# Patient Record
Sex: MALE | Race: WHITE | NOT HISPANIC OR LATINO | Employment: OTHER | URBAN - METROPOLITAN AREA
[De-identification: names, ages, dates, MRNs, and addresses within clinical notes are randomized per-mention and may not be internally consistent; named-entity substitution may affect disease eponyms.]

---

## 2018-11-19 ENCOUNTER — CONVERSION ENCOUNTER (OUTPATIENT)
Dept: SURGERY | Facility: CLINIC | Age: 63
End: 2018-11-19

## 2018-11-19 ENCOUNTER — OFFICE VISIT CONVERTED (OUTPATIENT)
Dept: SURGERY | Facility: CLINIC | Age: 63
End: 2018-11-19
Attending: SURGERY

## 2020-01-08 ENCOUNTER — HOSPITAL ENCOUNTER (OUTPATIENT)
Dept: OTHER | Facility: HOSPITAL | Age: 65
Discharge: HOME | End: 2020-01-08
Attending: INTERNAL MEDICINE

## 2020-01-08 LAB
ALBUMIN SERPL-MCNC: 4.2 G/DL (ref 3.5–5)
ALBUMIN/GLOB SERPL: 1.3 {RATIO} (ref 1.4–2.6)
ALP SERPL-CCNC: 85 U/L (ref 56–155)
ALT SERPL-CCNC: 34 U/L (ref 10–40)
ANION GAP SERPL CALC-SCNC: 15 MMOL/L (ref 8–19)
AST SERPL-CCNC: 21 U/L (ref 15–50)
BILIRUB SERPL-MCNC: 0.66 MG/DL (ref 0.2–1.3)
BUN SERPL-MCNC: 15 MG/DL (ref 5–25)
BUN/CREAT SERPL: 15 {RATIO} (ref 6–20)
CALCIUM SERPL-MCNC: 8.6 MG/DL (ref 8.7–10.4)
CHLORIDE SERPL-SCNC: 103 MMOL/L (ref 99–111)
CHOLEST SERPL-MCNC: 132 MG/DL (ref 107–200)
CHOLEST/HDLC SERPL: 3.9 {RATIO} (ref 3–6)
CONV CO2: 25 MMOL/L (ref 22–32)
CONV TOTAL PROTEIN: 7.5 G/DL (ref 6.3–8.2)
CREAT UR-MCNC: 0.98 MG/DL (ref 0.7–1.2)
GFR SERPLBLD BASED ON 1.73 SQ M-ARVRAT: >60 ML/MIN/{1.73_M2}
GLOBULIN UR ELPH-MCNC: 3.3 G/DL (ref 2–3.5)
GLUCOSE SERPL-MCNC: 120 MG/DL (ref 70–99)
HDLC SERPL-MCNC: 34 MG/DL (ref 40–60)
LDLC SERPL CALC-MCNC: 85 MG/DL (ref 70–100)
OSMOLALITY SERPL CALC.SUM OF ELEC: 290 MOSM/KG (ref 273–304)
POTASSIUM SERPL-SCNC: 4.4 MMOL/L (ref 3.5–5.3)
SODIUM SERPL-SCNC: 139 MMOL/L (ref 135–147)
TRIGL SERPL-MCNC: 67 MG/DL (ref 40–150)
VLDLC SERPL-MCNC: 13 MG/DL (ref 5–37)

## 2020-06-05 ENCOUNTER — HOSPITAL ENCOUNTER (OUTPATIENT)
Dept: OTHER | Facility: HOSPITAL | Age: 65
Discharge: HOME | End: 2020-06-05
Attending: INTERNAL MEDICINE

## 2020-06-05 LAB
ALBUMIN SERPL-MCNC: 4.6 G/DL (ref 3.5–5)
ALBUMIN/GLOB SERPL: 1.8 {RATIO} (ref 1.4–2.6)
ALP SERPL-CCNC: 65 U/L (ref 56–155)
ALT SERPL-CCNC: 24 U/L (ref 10–40)
ANION GAP SERPL CALC-SCNC: 17 MMOL/L (ref 8–19)
AST SERPL-CCNC: 17 U/L (ref 15–50)
BILIRUB SERPL-MCNC: 0.99 MG/DL (ref 0.2–1.3)
BNP SERPL-MCNC: 24 PG/ML (ref 0–900)
BUN SERPL-MCNC: 34 MG/DL (ref 5–25)
BUN/CREAT SERPL: 31 {RATIO} (ref 6–20)
CALCIUM SERPL-MCNC: 9.2 MG/DL (ref 8.7–10.4)
CHLORIDE SERPL-SCNC: 105 MMOL/L (ref 99–111)
CONV CO2: 22 MMOL/L (ref 22–32)
CONV TOTAL PROTEIN: 7.1 G/DL (ref 6.3–8.2)
CREAT UR-MCNC: 1.1 MG/DL (ref 0.7–1.2)
GFR SERPLBLD BASED ON 1.73 SQ M-ARVRAT: >60 ML/MIN/{1.73_M2}
GLOBULIN UR ELPH-MCNC: 2.5 G/DL (ref 2–3.5)
GLUCOSE SERPL-MCNC: 111 MG/DL (ref 70–99)
OSMOLALITY SERPL CALC.SUM OF ELEC: 298 MOSM/KG (ref 273–304)
POTASSIUM SERPL-SCNC: 4.3 MMOL/L (ref 3.5–5.3)
SODIUM SERPL-SCNC: 140 MMOL/L (ref 135–147)
T4 FREE SERPL-MCNC: 1.3 NG/DL (ref 0.9–1.8)
TSH SERPL-ACNC: 0.66 M[IU]/L (ref 0.27–4.2)

## 2020-06-29 ENCOUNTER — OUTSIDE FACILITY SERVICE (OUTPATIENT)
Dept: SLEEP MEDICINE | Facility: HOSPITAL | Age: 65
End: 2020-06-29

## 2020-06-29 ENCOUNTER — HOSPITAL ENCOUNTER (OUTPATIENT)
Dept: SLEEP MEDICINE | Facility: HOSPITAL | Age: 65
Discharge: HOME | End: 2020-06-29
Attending: INTERNAL MEDICINE

## 2020-06-29 PROCEDURE — 99244 OFF/OP CNSLTJ NEW/EST MOD 40: CPT | Performed by: INTERNAL MEDICINE

## 2020-07-08 ENCOUNTER — HOSPITAL ENCOUNTER (OUTPATIENT)
Dept: SLEEP MEDICINE | Facility: HOSPITAL | Age: 65
Discharge: HOME | End: 2020-07-08
Attending: INTERNAL MEDICINE

## 2020-07-13 ENCOUNTER — OUTSIDE FACILITY SERVICE (OUTPATIENT)
Dept: SLEEP MEDICINE | Facility: HOSPITAL | Age: 65
End: 2020-07-13

## 2020-07-13 PROCEDURE — 95806 SLEEP STUDY UNATT&RESP EFFT: CPT | Performed by: INTERNAL MEDICINE

## 2020-07-29 ENCOUNTER — HOSPITAL ENCOUNTER (OUTPATIENT)
Dept: SLEEP MEDICINE | Facility: HOSPITAL | Age: 65
Discharge: HOME | End: 2020-07-29
Attending: INTERNAL MEDICINE

## 2020-08-03 ENCOUNTER — OUTSIDE FACILITY SERVICE (OUTPATIENT)
Dept: SLEEP MEDICINE | Facility: HOSPITAL | Age: 65
End: 2020-08-03

## 2020-08-03 PROCEDURE — 95806 SLEEP STUDY UNATT&RESP EFFT: CPT | Performed by: INTERNAL MEDICINE

## 2020-08-13 ENCOUNTER — HOSPITAL ENCOUNTER (OUTPATIENT)
Dept: OTHER | Facility: HOSPITAL | Age: 65
Discharge: HOME | End: 2020-08-13
Attending: INTERNAL MEDICINE

## 2020-08-13 LAB — BNP SERPL-MCNC: 50 PG/ML (ref 0–900)

## 2020-09-16 ENCOUNTER — OUTSIDE FACILITY SERVICE (OUTPATIENT)
Dept: SLEEP MEDICINE | Facility: HOSPITAL | Age: 65
End: 2020-09-16

## 2020-09-16 ENCOUNTER — HOSPITAL ENCOUNTER (OUTPATIENT)
Dept: SLEEP MEDICINE | Facility: HOSPITAL | Age: 65
Discharge: HOME | End: 2020-09-16
Attending: INTERNAL MEDICINE

## 2020-09-16 PROCEDURE — 99213 OFFICE O/P EST LOW 20 MIN: CPT | Performed by: INTERNAL MEDICINE

## 2020-09-21 ENCOUNTER — OFFICE VISIT CONVERTED (OUTPATIENT)
Dept: SURGERY | Facility: CLINIC | Age: 65
End: 2020-09-21
Attending: SURGERY

## 2020-11-23 ENCOUNTER — OFFICE VISIT CONVERTED (OUTPATIENT)
Dept: PULMONOLOGY | Facility: CLINIC | Age: 65
End: 2020-11-23
Attending: INTERNAL MEDICINE

## 2020-11-23 ENCOUNTER — HOSPITAL ENCOUNTER (OUTPATIENT)
Dept: LAB | Facility: HOSPITAL | Age: 65
Discharge: HOME | End: 2020-11-23
Attending: INTERNAL MEDICINE

## 2020-11-23 ENCOUNTER — HOSPITAL ENCOUNTER (OUTPATIENT)
Dept: GENERAL RADIOLOGY | Facility: HOSPITAL | Age: 65
Discharge: HOME | End: 2020-11-23
Attending: INTERNAL MEDICINE

## 2020-11-23 LAB
ALBUMIN SERPL-MCNC: 4.4 G/DL (ref 3.5–5)
ANION GAP SERPL CALC-SCNC: 18 MMOL/L (ref 8–19)
BUN SERPL-MCNC: 24 MG/DL (ref 5–25)
BUN/CREAT SERPL: 22 {RATIO} (ref 6–20)
CALCIUM SERPL-MCNC: 9.1 MG/DL (ref 8.7–10.4)
CHLORIDE SERPL-SCNC: 104 MMOL/L (ref 99–111)
CONV CO2: 23 MMOL/L (ref 22–32)
CREAT UR-MCNC: 1.07 MG/DL (ref 0.7–1.2)
GFR SERPLBLD BASED ON 1.73 SQ M-ARVRAT: >60 ML/MIN/{1.73_M2}
GLUCOSE SERPL-MCNC: 93 MG/DL (ref 70–99)
OSMOLALITY SERPL CALC.SUM OF ELEC: 296 MOSM/KG (ref 273–304)
PHOSPHATE SERPL-MCNC: 4.4 MG/DL (ref 2.4–4.5)
POTASSIUM SERPL-SCNC: 4 MMOL/L (ref 3.5–5.3)
SODIUM SERPL-SCNC: 141 MMOL/L (ref 135–147)

## 2020-12-14 ENCOUNTER — HOSPITAL ENCOUNTER (OUTPATIENT)
Dept: OTHER | Facility: HOSPITAL | Age: 65
Discharge: HOME | End: 2020-12-14
Attending: INTERNAL MEDICINE

## 2020-12-14 LAB
ALBUMIN SERPL-MCNC: 4.3 G/DL (ref 3.5–5)
ALBUMIN/GLOB SERPL: 1.4 {RATIO} (ref 1.4–2.6)
ALP SERPL-CCNC: 69 U/L (ref 56–155)
ALT SERPL-CCNC: 30 U/L (ref 10–40)
ANION GAP SERPL CALC-SCNC: 18 MMOL/L (ref 8–19)
AST SERPL-CCNC: 21 U/L (ref 15–50)
BILIRUB SERPL-MCNC: 0.46 MG/DL (ref 0.2–1.3)
BUN SERPL-MCNC: 36 MG/DL (ref 5–25)
BUN/CREAT SERPL: 23 {RATIO} (ref 6–20)
CALCIUM SERPL-MCNC: 9.7 MG/DL (ref 8.7–10.4)
CHLORIDE SERPL-SCNC: 102 MMOL/L (ref 99–111)
CHOLEST SERPL-MCNC: 155 MG/DL (ref 107–200)
CHOLEST/HDLC SERPL: 4.7 {RATIO} (ref 3–6)
CONV CO2: 27 MMOL/L (ref 22–32)
CONV TOTAL PROTEIN: 7.3 G/DL (ref 6.3–8.2)
CREAT UR-MCNC: 1.6 MG/DL (ref 0.7–1.2)
GFR SERPLBLD BASED ON 1.73 SQ M-ARVRAT: 45 ML/MIN/{1.73_M2}
GLOBULIN UR ELPH-MCNC: 3 G/DL (ref 2–3.5)
GLUCOSE SERPL-MCNC: 116 MG/DL (ref 70–99)
HDLC SERPL-MCNC: 33 MG/DL (ref 40–60)
LDLC SERPL CALC-MCNC: 74 MG/DL (ref 70–100)
OSMOLALITY SERPL CALC.SUM OF ELEC: 305 MOSM/KG (ref 273–304)
POTASSIUM SERPL-SCNC: 3.9 MMOL/L (ref 3.5–5.3)
SODIUM SERPL-SCNC: 143 MMOL/L (ref 135–147)
TRIGL SERPL-MCNC: 240 MG/DL (ref 40–150)
VLDLC SERPL-MCNC: 48 MG/DL (ref 5–37)

## 2021-04-22 ENCOUNTER — HOSPITAL ENCOUNTER (OUTPATIENT)
Dept: CARDIOLOGY | Facility: HOSPITAL | Age: 66
Discharge: HOME | End: 2021-04-22
Attending: INTERNAL MEDICINE

## 2021-05-10 ENCOUNTER — OFFICE VISIT CONVERTED (OUTPATIENT)
Dept: SURGERY | Facility: CLINIC | Age: 66
End: 2021-05-10
Attending: SURGERY

## 2021-05-10 NOTE — H&P
History and Physical      Patient Name: Ricky Sturgeon   Patient ID: [id number]   Sex: Male   YOB: 1955    Primary Care Provider: Mehran Ortiz MD   Referring Provider: Mehran Ortiz MD    Visit Date: September 21, 2020    Provider: Jonas Barriga MD   Location: [facility] [place] [facility]   Location Address: [address] [place] [address]  [id number]   Location Phone: [phone]          Chief Complaint  · Outpatient History & Physical / Surgical Orders  · Colon Consult  · Constipation  · Reflux  · Bloating      History Of Present Illness  Ricky W. Sturgeon is a 65 year old /White male who presents to the office today as a consult from Mehran Ortiz MD.      The patient was referred to have a colonoscopy and an EGD.  Patient last had an EGD and colonoscopy on 12/19/2018.  Patient's mother and father both had colon cancer and Harvey's brother also had esophageal cancer.  The colonoscopy on 12/19/2018 showed diverticulosis but no colon polyps.  The EGD showed an irregular GE junction and biopsies confirmed the presence of Ortega's.  He takes Prilosec 20 mg daily and has taken this for many years.  Does not have much reflux at all as long as he takes Prilosec every day.  Since the patient's last colonoscopy in 2018, his brother was diagnosed with colon cancer.  The patient is very worried he may have colon cancer himself and wants to have another colonoscopy.  No change in normal bowel function.  The patient has constipation but this is not new and has been present for many years.  Patient takes Plavix.  He said it was started in September 2019 after he had an MI and a cardiac stent was placed.       Past Medical History  Disease Name Date Onset Notes   Allergic rhinitis, chronic --  --    Arthritis --  --    Bulging Disc --  --    Chest pain --  --    chronic back pain --  --    Congestive heart failure --  --    Dysphagia --  --    Gastroesophageal Reflux  "Disorder --  --    GERD --  --    Heart Attack --  --    Hemorrhoids, external --  --    High blood pressure --  --    High cholesterol --  --    Lumbago/low back pain --  --    Mood disorder --  --    Postoperative Exam Following Surgery 10/09/2014 --    Prostate Disorder --  --    Shortness Of Air --  --          Past Surgical History  Procedure Name Date Notes   Colonoscopy --  --    EGD (Endoscopy) --  --    Heel Spur --  heel spurs removed from both heels   Hernia Repair --  left inguinal   Shoulder surgery --  bilateral         Medication List  Name Date Started Instructions   aspirin 81 mg oral tablet,delayed release (DR/EC)  --    Cozaar 25 mg oral tablet  --    Lipitor 20 mg oral tablet  --    multivitamin oral capsule  --    Plavix 75 mg oral tablet  take 1 tablet (75 mg) by oral route once daily   Protonix 20 mg oral tablet,delayed release (DR/EC)  --    Prozac 10 mg oral capsule  take 1 capsule (10 mg) by oral route once daily         Allergy List  Allergen Name Date Reaction Notes   SULFA (SULFONAMIDES) --  --  --        Allergies Reconciled  Family Medical History  Disease Name Relative/Age Notes   Colon Neoplasm, Malignant Father/  Mother/   --    Stroke  --    Cancer, Unspecified  --    Diabetes, unspecified type Mother/   Mother   Family history of colon cancer Father/70s  Mother/70s   Mother/70s; Father/70s         Social History  Finding Status Start/Stop Quantity Notes   Second hand smoke exposure Current some day --/-- --  yes   Tobacco Never --/-- --  never a smoker         Review of Systems  · Constitutional  o Denies  o : chills, fever  · Gastrointestinal  o Denies  o : vomiting      Vitals  Date Time BP Position Site L\R Cuff Size HR RR TEMP (F) WT  HT  BMI kg/m2 BSA m2 O2 Sat        09/21/2020 11:39 AM       14  254lbs 4oz 5'  8\" 38.66 2.35           Physical Examination  · Constitutional  o Appearance  o : healthy appearing, alert and in no acute distress, reliable historian, here " alone  · Head and Face  o Head  o :   § Inspection  § : no visable deformities or lesions  · Eyes  o Conjunctivae  o : clear  o Sclerae  o : clear  · Neck  o Inspection/Palpation  o : normal appearance, no masses, trachea midline  · Respiratory  o Respiratory Effort  o : breathing unlabored, respiratory effort appears normal  o Inspection of Chest  o : normal appearance, no retractions  · Cardiovascular  o Heart  o : regular rate and rhythm  · Gastrointestinal  o Abdominal Examination  o :   § Abdomen  § : soft, nondistended  · Skin and Subcutaneous Tissue  o General Inspection  o : no visible concerning rashes or lesions present  · Neurologic  o Cranial Nerves  o : no obvious motor deficits  o Sensation  o : no obvious sensory deficits  o Gait and Station  o :   § Gait Screening  § : normal gait, able to stand without diffculty  o Cerebellar Function  o : no obvious abnormalities  · Psychiatric  o Judgement and Insight  o : judgment and insight intact  o Mood and Affect  o : mood normal, affect appropriate          Assessment  · Pre-Surgical Orders     V72.84  · Family history of malignant neoplasm of bowel     V16.0/Z80.0  · GERD (gastroesophageal reflux disease)     530.81/K21.9  · Ortega esophagus     530.85/K22.70      Plan  · Orders  o Colonoscopy (06953) - V72.84 - 10/14/2020  o Endoscopy (52065) - V72.84 - 10/14/2020  o [facility] Covid-19 Screening (65756) - V72.84, V16.0/Z80.0, 530.85/K22.70, 530.81/K21.9 - 10/09/2020   Covid test at 3:00pm  · Medications  o Medications have been Reconciled  o Transition of Care or Provider Policy  · Instructions  o PLAN: Colonoscopy and Esophagogastroduodenoscopy  o Outpatient  o The indications, options, risks, benefits, and expected outcomes of the planned procedure were discussed with the patient and the patient agrees to proceed.   o IV: LR@ 30 ml/hr  o Anesthesia: MAC  o PLEASE SIGN PERMIT FOR: Colonscopy with possible biopsy and possible polypectomy  o PLEASE SIGN  PERMIT FOR: Esophagogastroduodenoscopy with possible biopsy  o Electronically Identified Patient Education Materials Provided Electronically     Patient was instructed to stop taking Plavix 5 days before the day the colonoscopy and EGD will be performed.    Addendum, 9/22/20:  The patient called the office after he left and informed me that his cardiac stent was placed just 6 months ago (note one year as he told me during the office visit).  That said, there is no urgency for the patient to have the endoscopy procedures so the patient will schedule appointment to see me again in 6 months.  That will be one year after the cardiac stent was placed and Plavix can then be more safely held for 5 days for the endoscopy procedures.             Electronically Signed by: Jonas Barriga MD -Author on September 22, 2020 08:35:46 AM

## 2021-05-14 VITALS — HEIGHT: 68 IN | WEIGHT: 254.25 LBS | RESPIRATION RATE: 14 BRPM | BODY MASS INDEX: 38.53 KG/M2

## 2021-05-16 VITALS — WEIGHT: 236.25 LBS | RESPIRATION RATE: 16 BRPM | HEIGHT: 68 IN | BODY MASS INDEX: 35.8 KG/M2

## 2021-05-28 VITALS
OXYGEN SATURATION: 96 % | DIASTOLIC BLOOD PRESSURE: 66 MMHG | HEART RATE: 68 BPM | HEIGHT: 68 IN | BODY MASS INDEX: 37.89 KG/M2 | RESPIRATION RATE: 18 BRPM | WEIGHT: 250 LBS | TEMPERATURE: 98.4 F | SYSTOLIC BLOOD PRESSURE: 124 MMHG

## 2021-05-28 NOTE — PROGRESS NOTES
Patient: STURGEON,RICKY W     Acct: [id number]     Report: #[id number]  UNIT #: [id number]     : 1955    Encounter Date:2020  PRIMARY CARE: PK MARIE  ***Signed***  --------------------------------------------------------------------------------------------------------------------  Chief Complaint      Encounter Date      2020            Referring Provider      SELF,REFERRED            Patient Complaint      Patient is complaining of      New Patient- SOA            VITALS      Height 5 ft 8 in / 172.72 cm      Weight 250 lbs 0 oz / 113.3981 kg      BSA 2.25 m2      BMI 38.0 kg/m2      Temperature 98.4 F / 36.89 C - Temporal      Pulse 68      Respirations 18      Blood Pressure 124/66 Sitting, Right Arm      Pulse Oximetry 96%, room air      Initial Exhaled Nitrous Oxide      Date:  2020      Exhaled Nitrous Oxide Results:  10            HPI      The patient is a 65 year old male with a history of obese, obstructive sleep     apnea, congestive heart failure and diastolic congestive heart failure with     history of stent placement in 2018. He has had a 40 pound weight gain and has    been having worsening shortness of breath.  The patient is referred to me for     exertional dyspnea. He has no cough, fever, chills, nausea, vomiting or     wheezing. He has leg swelling and shortness of breath with activity. He use to     be very active, but now is not able to walk, even on level ground he cannot walk    long distance. He never smoked, never had pulmonary function test.  He had an     echocardiogram in the past with Dr. Monge that showed diastolic heart     failure. He has significant leg swelling, does not have any leg pains.  No chest    pain or chest tightness.  He had a pacemaker in the past. He is currently on     Lasix.            ROS      Constitutional:  Complains of: Fatigue; Denies: Fever, Weight gain, Weight loss,    Chills, Insomnia, Other       Respiratory/Breathing:  Complains of: Shortness of air, Wheezing; Denies: Cough,    Hemoptysis, Pleuritic pain, Other      Endocrine:  Denies: Polydipsia, Polyuria, Heat/cold intolerance, Diabetes, Other      Eyes:  Denies: Blurred vision, Vision Changes, Other      Ears, nose, mouth, throat:  Denies: Mouth lesions, Thrush, Throat pain,     Hoarseness, Allergies/Hay Fever, Post Nasal Drip, Headaches, Recent Head Injury,    Nose Bleeding, Neck Stiffness, Thyroid Mass, Hearing Loss, Ear Fullness, Dry     Mouth, Nasal or Sinus Pain, Dry Lips, Nasal discharge, Nasal congestion, Other      Cardiovascular:  Denies: Palpitations, Syncope, Claudication, Chest Pain, Wake     up Gasping for air, Leg Swelling, Irregular Heart Rate, Cyanosis, Dyspnea on     Exertion, Other      Gastrointestinal:  Denies: Nausea, Constipation, Diarrhea, Abdominal pain,     Vomiting, Difficulty Swallowing, Reflux/Heartburn, Dysphagia, Jaundice,     Bloating, Melena, Bloody stools, Other      Genitourinary:  Denies: Urinary frequency, Incontinence, Hematuria, Urgency,     Nocturia, Dysuria, Testicular problems, Other      Musculoskeletal:  Denies: Joint Pain, Joint Stiffness, Joint Swelling, Myalgias,    Other      Hematologic/lymphatic:  DENIES: Lymphadenopathy, Bruising, Bleeding tendencies,     Other      Neurological:  Denies: Headache, Numbness, Weakness, Seizures, Other      Psychiatric:  Denies: Anxiety, Appropriate Effect, Depression, Other      Sleep:  No: Excessive daytime sleep, Morning Headache?, Snoring, Insomnia?, Stop    breathing at sleep?, Other      Integumentary:  Denies: Rash, Dry skin, Skin Warm to Touch, Other      Immunologic/Allergic:  Denies: Latex allergy, Seasonal allergies, Asthma,     Urticaria, Eczema, Other      Immunization status:  No: Up to date            FAMILY/SOCIAL/MEDICAL HX      Surgical History:  Yes: Orthopedic Surgery (LEFT KNEE), Other Surgeries; No:     Abdominal Surgery, Appendectomy, Bladder  Surgery, Bowel Surgery, CABG,     Cholecystectomy, Head Surgery, Oral Surgery, Vascular Surgery      Other Family Medical History:  Father      Is Father Still Living?:  No      Is Mother Still Living?:  No       Family History:  Yes      Social History:  No Tobacco Use, No Alcohol Use, No Recreational Drug use      Smoking status:  Never smoker      Anticoagulation Therapy:  No      Antibiotic Prophylaxis:  No      Medical History:  Yes: Arthritis (BACK), Seizures, Heart Attack, High Blood     Pressure; No: Alcoholism, Allergies, Anemia, Asthma, Atrial Fibrillation, Blood     Disease, Broken Bones, Cataracts, Chemical Dependency, Chemotherapy/Cancer,     Chronic Bronchitis/COPD, Emphysema, Chronic Liver Disease, Colon Trouble,     Colitis, Diverticulitis, Congestive Heart Failu, Deafness or Ringing Ears,     Convulsions, Depression, Anxiety, Bipolar Disorder, PTSD, Diabetes, Epilepsy,     Forgetfullness, Glaucoma, Gall Stones, Gout, Head Injury, Heart Murmur, GERD,     Hemorrhoids/Rectal Prob, Hepatitis, Hiatal Hernia, High Cholesterol, HIV (Do not    ask - volu, Jaundice, Kidney or Bladder Disease, Kidney Stones, Migrane     Headaches, Mitral Valve Prolapse, Night sweats, Phlebitis, Psychiatric Care,     Reflux Disease, Rheumatic Fever, Sexually Transmitted Dis, Shortness Of Breath,     Sinus Trouble, Skin Disease/Psoriais/Ecz, Stroke, Thyroid Problem, Tuberculosis     or Pos TB Te, Miscellaneous Medical/oth      Psychiatric History      none            PREVENTION      Hx Influenza Vaccination:  No      Influenza Vaccine Declined:  Yes      2 or More Falls in Past Year?:  No      Fall Past Year with Injury?:  No      Hx Pneumococcal Vaccination:  No      Encouraged to follow-up with:  PCP regarding preventative exams.      Chart initiated by      Blaire Stanford MA            ALLERGIES/MEDICATIONS      Allergies:        Coded Allergies:             SULFA (SULFONAMIDE ANTIBIOTICS) (Verified  Allergy, Unknown, RASH,      SWELLING, 11/23/20)           SULFAMETHOXAZOLE (Verified  Allergy, Unknown, RASH, 11/23/20)           TRIMETHOPRIM (Verified  Allergy, Unknown, RASH, 11/23/20)      Medications    Last Reconciled on 11/23/20 15:33 by ALON MOURA MD      metOLazone (metOLazone) 5 Mg Tablet      10 MG PO QDAY, #60 TAB 1 Refill         Prov: Alon Moura         11/23/20       Atorvastatin Calcium (Lipitor*) 20 Mg Tablet      40 MG PO QDAY, #60 TAB 0 Refills         Reported         11/23/20       Furosemide* (Lasix*) 40 Mg Tablet      40 MG PO TID, #90 TAB 0 Refills         Reported         11/23/20       Pantoprazole (Protonix) 20 Mg Tablet.dr      40 MG PO QDAY, TAB         Reported         11/23/20       Clopidogrel Bisulfate (Plavix) 75 Mg Tablet      75 MG PO QDAY, #30 TAB 0 Refills         Reported         11/23/20       Losartan Potassium (Cozaar) 100 Mg Tablet      100 MG PO QDAY, #30 TAB 0 Refills         Reported         11/23/20       Metoprolol Succinate (Metoprolol Succinate) 25 Mg Tab.er.24h      25 MG PO QDAY, #30 TAB 0 Refills         Reported         11/23/20       FLUoxetine HCl (FLUoxetine HCl) 20 Mg Capsule      20 MG PO HS, CAP         Reported         11/23/20       Aspirin EC (Aspirin EC) 81 Mg Tablet.dr      81 MG PO QDAY, #30 TAB.SR 0 Refills         Reported         9/7/19       Meloxicam (Mobic*) 15 Mg Tablet      15 MG PO QDAY, TAB         Reported         7/12/19      Current Medications      Current Medications Reviewed 11/23/20            EXAM      CONSTITUTIONAL: Pleasant  obese male in no acute distress, normal conversant.       EYES : Pink conjunctive, no ptosis, PERRL.       ENMT : Nose and ears appear normal, normal dentition, mild posterior pharyngeal     wall erythema, no sinus tenderness. Mallampati classification 2.        Neck: Nontender, no masses, no thyromegaly, no nodules.      Resp : Bilateral diminished breath sounds, right basilar crackles.  No wheezing,    crackles or rhonchi.   Resonant to percussion bilaterally.      CVS  : No carotid bruits, s1s2 nl, RRR, no murmur, rubs or gallop.        Extremities:  1+ pedal edema bilateral lower extremities, nontender to     palpation.        Chest wall: Normal rise with inspiration, nontender on palpation.      GI   : Abdomen soft, with no masses, no hepatosplenomegaly, no hernias, BS+      MSK  : Normal gait and station, no digital cyanosis or clubbing       Skin : No rashes, ulcerations or lesions, normal turgor and temperature      Neuro: CN II - XII intact, no sensory deficits, DTRs intact and symmetrical, no     motor weakness      Psych: Appropriate affect, A   Vtials      Vitals:             Height 5 ft 8 in / 172.72 cm           Weight 250 lbs 0 oz / 113.3981 kg           BSA 2.25 m2           BMI 38.0 kg/m2           Temperature 98.4 F / 36.89 C - Temporal           Pulse 68           Respirations 18           Blood Pressure 124/66 Sitting, Right Arm           Pulse Oximetry 96%, room air            REVIEW      Results Reviewed      PCCS Results Reviewed?:  Yes Prev Lab Results, Yes Prev Radiology Results, Yes     [facility]      Lab Results      The patient's NT-proBNP in the past was normal. The patient's serum creatinine     from 2020 was normal.  The patient's echocardiogram from Dr. Monge's     office from 06/10/2020 showed normal sized left ventricle with good     contractility, mild tricuspid and aortic regurgitation and diastolic dysfunction    of left ventricle.      Radiographic Results               [facility] Diagnostic Img                PACS RADIOLOGY REPORT            Patient: STURGEON,RICKY W   Acct: #[id number]   Report: #[id number]            UNIT #: [id number]    DOS: 20 1602      INSURANCE:[employer] - PPO   ORDER #:[id number]      LOCATION:IVORY     : 1955            PROVIDERS      ADMITTING:     ATTENDING: Alon Moura       FAMILY:  NONE,MD   ORDERING:  Alon Moura         OTHER:    DICTATING:  MIKE HARDY MD            REQ #:[id number]   EXAM:CXR2 - CHEST 2V AP PA LAT      REASON FOR EXAM:        REASON FOR VISIT:  DYSPNEA            *******Signed******         PROCEDURE:   CHEST AP/PA AND LATERAL             COMPARISON:   [facility], , CHEST AP/PA 1 VIEW, 8/14/2020,     11:30.             INDICATIONS:   SHORTNESS OF BREATH WITH MILD EXERTION FOR 18 MONTHS.             FINDINGS:         Heart size unchanged.  Pacer leads are stable.  No dense consolidation, pleural     fluid or       pneumothorax.             CONCLUSION:   No acute change from 8/14/2020              MIKE HARDY MD             Electronically Signed and Approved By: MIKE HARDY MD on 11/23/2020 at 16:50                               Until signed, this is an unconfirmed preliminary report that may contain      errors and is subject to change.                                              DOWER:      D:11/23/20 1650            Assessment      YADAV (dyspnea on exertion) - R06.00            Notes      New Medications      * FLUoxetine HCl 20 MG CAPSULE: 20 MG PO HS      * Metoprolol Succinate 25 MG TAB.ER.24H: 25 MG PO QDAY #30      * Losartan Potassium (Cozaar) 100 MG TABLET: 100 MG PO QDAY #30      * Clopidogrel Bisulfate (Plavix) 75 MG TABLET: 75 MG PO QDAY #30      * PANTOPRAZOLE (Protonix) 20 MG TABLET.DR: 40 MG PO QDAY      * Furosemide* (Lasix*) 40 MG TABLET: 40 MG PO TID #90      * Atorvastatin Calcium (Lipitor*) 20 MG TABLET: 40 MG PO QDAY #60      * metOLazone 5 MG TABLET: 10 MG PO QDAY #60      Discontinued Medications      * TICAGRELOR (Brilinta) 90 MG TABLET: 90 MG PO BID 30 Days #60      * Atorvastatin 40 MG TABLET: 40 MG PO HS 30 Days #30      New Diagnostics      * PFT-Comp, PrePost,DLCO,BodyBox, Week         Dx: YADAV (dyspnea on exertion) - R06.00      * 6 Min Walk w O2 Titration Test, Routine         Dx: YADAV (dyspnea on exertion) - R06.00       * Renal Function Panel, Month         Dx: YADAV (dyspnea on exertion) - R06.00      * TESTING ORDERS PROCESS, Routine      * Chest 2 View, Week         Dx: YADAV (dyspnea on exertion) - R06.00      * BMP, Routine         Dx: YADAV (dyspnea on exertion) - R06.00      PLAN:  The patient is a 65 year old male with exertional dyspnea, likely related    to diastolic heart failure, obesity and obstructive sleep apnea.      1.  Diastolic heart failure with obesity and obstructive sleep apnea.  I will     check NT-proBNP, renal panel and chest x-ray today. I will add metolazone 10 mg     once daily on top of Lasix if his kidney function is stable. I advised him to     weigh himself everyday and try to maintain weight or decrease weight if     possible. Dietary modification was discussed in detail as well.        2. I will check pulmonary function test and six minute walk test.        3. Continue with Lasix at 40 mg once daily.      4. Obstructive sleep apnea.  Sleep hygiene was discussed in detail.  Weight loss    counseling was done.      5. Continue CPAP at current settings.  I will check pulmonary function test, six    minute walk test and have him follow up in two months.       6.  His FENO today was 10 which shows he does not have significant chronic     eosinophilic inflammation.        7. Follow up in 2-3 months, earlier if needed.            Patient Education      Education resources provided:  Yes      Patient Education Provided:  Acute Bronchitis            Electronically signed by Alon Moura  11/25/2020 21:06       Disclaimer: Converted document may not contain table formatting or lab diagrams. Please see [employer] for the authenticated document.

## 2021-06-05 NOTE — H&P
History and Physical      Patient Name: Ricky Sturgeon   Patient ID: [id number]   Sex: Male   YOB: 1955    Primary Care Provider: Mehran Ortiz MD   Referring Provider: Mehran Ortiz MD    Visit Date: May 10, 2021    Provider: Jonas Barriga MD   [place] [facility]   Location Address: [address] [place] [address]  [id number]   Location Phone: [phone]          Chief Complaint  · Outpatient History & Physical / Surgical Orders  · Colon Consult  · Constipation  · Reflux  · Bloating      History Of Present Illness  Ricky W. Sturgeon is a 66 year old /White male who presents to the office today as a consult from Mehran Ortiz MD.      Patient saw me on 9/21/2020 to schedule an EGD and a colonoscopy but at the time he had had a heart stent placed just 6 months before that office visit and was taking Plavix so I told him we needed to defer doing the colonoscopy and EGD until at least 1 year after the stent was placed so that Plavix could more safely be held for 5 days for the endoscopic procedures.  Patient is here to schedule the EGD and colonoscopy.  A copy and paste of the HPI section and plan section from my office visit on 9/21/2020 are available below.  There have been no changes regarding the indications for the colonoscopy and EGD.    Copy and paste of HPI section from office visit on 9/21/2020  The patient was referred to have a colonoscopy and an EGD.  Patient last had an EGD and colonoscopy on 12/19/2018.  Patient's mother and father both had colon cancer and Harvey's brother also had esophageal cancer.  The colonoscopy on 12/19/2018 showed diverticulosis but no colon polyps.  The EGD showed an irregular GE junction and biopsies confirmed the presence of Ortega's.  He takes Prilosec 20 mg daily and has taken this for many years.  Does not have much reflux at all as long as he takes Prilosec every day.  Since the patient's last  colonoscopy in 2018, his brother was diagnosed with colon cancer.  The patient is very worried he may have colon cancer himself and wants to have another colonoscopy.  No change in normal bowel function.  The patient has constipation but this is not new and has been present for many years.  Patient takes Plavix.  He said it was started in September 2019 after he had an MI and a cardiac stent was placed.    Copy and paste of Plan section from office visit on 9/21/2020  Patient was instructed to stop taking Plavix 5 days before the day the colonoscopy and EGD will be performed.    Addendum, 9/22/20:  The patient called the office after he left and informed me that his cardiac stent was placed just 6 months ago (note one year as he told me during the office visit).  That said, there is no urgency for the patient to have the endoscopy procedures so the patient will schedule appointment to see me again in 6 months.  That will be one year after the cardiac stent was placed and Plavix can then be more safely held for 5 days for the endoscopy procedures.       Past Medical History  Disease Name Date Onset Notes   Allergic rhinitis, chronic --  --    Arthritis --  --    Bulging Disc --  --    Chest pain --  --    chronic back pain --  --    Congestive heart failure --  --    Dysphagia --  --    Gastroesophageal Reflux Disorder --  --    GERD --  --    Heart Attack --  --    Hemorrhoids, external --  --    High blood pressure --  --    High cholesterol --  --    Lumbago/low back pain --  --    Mood disorder --  --    Postoperative Exam Following Surgery 10/09/2014 --    Prostate Disorder --  --    Shortness Of Air --  --          Past Surgical History  Procedure Name Date Notes   Colonoscopy --  --    EGD (Endoscopy) --  --    Heel Spur --  heel spurs removed from both heels   Hernia Repair --  left inguinal   Shoulder surgery --  bilateral         Medication List  Name Date Started Instructions   aspirin 81 mg oral  "tablet,delayed release (DR/EC)  --    atorvastatin 40 mg oral tablet  take 1 tablet (40 mg) by oral route once daily   Cozaar 25 mg oral tablet  --    furosemide 40 mg oral tablet  take 1 tablet (40 mg) by oral route once daily   Lipitor 20 mg oral tablet  --    losartan 100 mg oral tablet  take 1 tablet (100 mg) by oral route once daily   meloxicam 15 mg oral tablet  take 1 tablet (15 mg) by oral route once daily   metolazone 5 mg oral tablet  take 2 tablets (10 mg) by oral route once daily   metoprolol succinate 25 mg oral tablet extended release 24 hr  take 1 tablet (25 mg) by oral route once daily   multivitamin oral capsule  --    Plavix 75 mg oral tablet  take 1 tablet (75 mg) by oral route once daily   Probiotic 15 billion cell oral capsule  take 1 capsule by oral route daily   Protonix 20 mg oral tablet,delayed release (DR/EC)  --    Prozac 10 mg oral capsule  take 1 capsule (10 mg) by oral route once daily         Allergy List  Allergen Name Date Reaction Notes   SULFA (SULFONAMIDES) --  --  --        Allergies Reconciled  Family Medical History  Disease Name Relative/Age Notes   Colon Neoplasm, Malignant Father/  Mother/   --    Stroke  --    Cancer, Unspecified  --    Diabetes, unspecified type Mother/   Mother   Family history of colon cancer Father/70s  Mother/70s   Mother/70s; Father/70s         Social History  Finding Status Start/Stop Quantity Notes   Second hand smoke exposure Current some day --/-- --  yes   Tobacco Never --/-- --  never a smoker         Review of Systems  · Constitutional  o Denies  o : chills, fever  · Gastrointestinal  o Denies  o : vomiting      Vitals  Date Time BP Position Site L\R Cuff Size HR RR TEMP (F) WT  HT  BMI kg/m2 BSA m2 O2 Sat FR L/min FiO2        05/10/2021 09:58 AM       17  258lbs 0oz 5'  8\" 39.23 2.37             Physical Examination  · Constitutional  o Appearance  o : alert and in no acute distress, reliable historian, wife present in room  · Head and " Face  o Head  o :   § Inspection  § : no visable deformities or lesions  · Eyes  o Conjunctivae  o : clear  o Sclerae  o : clear  · Neck  o Inspection/Palpation  o : normal appearance, no masses, trachea midline  · Respiratory  o Respiratory Effort  o : breathing unlabored, respiratory effort appears normal  o Inspection of Chest  o : normal appearance, no retractions  · Cardiovascular  o Heart  o : regular rate and rhythm  · Gastrointestinal  o Abdominal Examination  o :   § Abdomen  § : soft, nondistended  · Skin and Subcutaneous Tissue  o General Inspection  o : no visible concerning rashes or lesions present  · Neurologic  o Cranial Nerves  o : no obvious motor deficits  o Sensation  o : no obvious sensory deficits  o Gait and Station  o :   § Gait Screening  § : normal gait, able to stand without diffculty  o Cerebellar Function  o : no obvious abnormalities  · Psychiatric  o Judgement and Insight  o : judgment and insight intact  o Mood and Affect  o : mood normal, affect appropriate          Assessment  · Pre-Surgical Orders     V72.84  · Family history of malignant neoplasm of bowel     V16.0/Z80.0  · GERD (gastroesophageal reflux disease)     530.81/K21.9  · 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   o IV: LR@ 30 ml/hr  o Anesthesia: MAC  o PLEASE SIGN PERMIT FOR: Colonscopy with possible biopsy and possible polypectomy  o PLEASE SIGN PERMIT FOR: Esophagogastroduodenoscopy with possible biopsy  o Follow Up: Will decide if/when follow up needed after procedure  performed.  o Electronically Identified Patient Education Materials Provided Electronically     Patient was instructed to stop taking Plavix 5 days before the day the colonoscopy and EGD will be performed.  Patient says he has already received the Covid vaccination.                 Electronically Signed by: Jonas Barriga MD -Author on May 10, 2021 10:55:13 AM

## 2021-06-09 ENCOUNTER — TELEPHONE (OUTPATIENT)
Dept: SURGERY | Facility: CLINIC | Age: 66
End: 2021-06-09

## 2021-06-09 NOTE — TELEPHONE ENCOUNTER
Called and spoke with pt. He r/s colonoscopy and EGD to 7/21/21. Called and spoke with Monique in surgery scheduling.

## 2021-06-09 NOTE — TELEPHONE ENCOUNTER
Pts wife called and stated he needs to r/s his colon and EGD because he is having some issues with his spine and maybe some neurologic issues. Pt said Dr. Barriga is aware of other issues and told the patient and his wife to keep him in the loop. The other physicians the patient sees suggested they hold off on the colonoscopy/EGD for now. The patient's wife said they will call back to r/s the procedures once his other problems have been worked up

## 2021-06-17 ENCOUNTER — TRANSCRIBE ORDERS (OUTPATIENT)
Dept: ADMINISTRATIVE | Facility: HOSPITAL | Age: 66
End: 2021-06-17

## 2021-06-17 DIAGNOSIS — M50.30 DEGENERATION OF CERVICAL INTERVERTEBRAL DISC: Primary | ICD-10-CM

## 2021-06-17 DIAGNOSIS — M54.10 RADICULOPATHY, UNSPECIFIED SPINAL REGION: ICD-10-CM

## 2021-06-23 ENCOUNTER — APPOINTMENT (OUTPATIENT)
Dept: CT IMAGING | Facility: HOSPITAL | Age: 66
End: 2021-06-23

## 2021-06-23 ENCOUNTER — HOSPITAL ENCOUNTER (OUTPATIENT)
Dept: CT IMAGING | Facility: HOSPITAL | Age: 66
Discharge: HOME | End: 2021-06-23

## 2021-06-23 DIAGNOSIS — M54.10 RADICULOPATHY, UNSPECIFIED SPINAL REGION: ICD-10-CM

## 2021-06-23 DIAGNOSIS — M50.30 DEGENERATION OF CERVICAL INTERVERTEBRAL DISC: ICD-10-CM

## 2021-06-23 PROCEDURE — 72131 CT LUMBAR SPINE W/O DYE: CPT

## 2021-06-23 PROCEDURE — 72128 CT CHEST SPINE W/O DYE: CPT

## 2021-06-28 ENCOUNTER — OFFICE VISIT (OUTPATIENT)
Dept: SURGERY | Facility: CLINIC | Age: 66
End: 2021-06-28

## 2021-06-28 VITALS — HEIGHT: 68 IN | BODY MASS INDEX: 39.47 KG/M2 | WEIGHT: 260.4 LBS

## 2021-06-28 DIAGNOSIS — M62.08 DIASTASIS RECTI: Primary | ICD-10-CM

## 2021-06-28 PROBLEM — M54.50 LOW BACK PAIN: Status: ACTIVE | Noted: 2021-06-28

## 2021-06-28 PROBLEM — N42.9 PROSTATE DISORDER: Status: ACTIVE | Noted: 2021-06-28

## 2021-06-28 PROBLEM — I21.9 HEART ATTACK (HCC): Status: ACTIVE | Noted: 2021-06-28

## 2021-06-28 PROBLEM — M51.9 OTHER AND UNSPECIFIED DISC DISORDER: Status: ACTIVE | Noted: 2021-06-28

## 2021-06-28 PROBLEM — F39 MOOD DISORDER (HCC): Status: ACTIVE | Noted: 2021-06-28

## 2021-06-28 PROBLEM — M54.16 LUMBAR RADICULOPATHY: Status: ACTIVE | Noted: 2021-06-24

## 2021-06-28 PROBLEM — R13.10 DYSPHAGIA: Status: ACTIVE | Noted: 2021-06-28

## 2021-06-28 PROBLEM — R06.02 SHORTNESS OF BREATH: Status: ACTIVE | Noted: 2020-02-02

## 2021-06-28 PROBLEM — M50.30 DDD (DEGENERATIVE DISC DISEASE), CERVICAL: Status: ACTIVE | Noted: 2021-06-24

## 2021-06-28 PROBLEM — I63.9 STROKE (CEREBRUM) (HCC): Status: ACTIVE | Noted: 2021-06-28

## 2021-06-28 PROBLEM — K64.4 HEMORRHOIDS, EXTERNAL: Status: ACTIVE | Noted: 2021-06-28

## 2021-06-28 PROBLEM — I50.9 CONGESTIVE HEART FAILURE: Status: ACTIVE | Noted: 2021-06-28

## 2021-06-28 PROBLEM — I25.10 CORONARY ARTERY DISEASE: Status: ACTIVE | Noted: 2021-06-28

## 2021-06-28 PROBLEM — M54.12 CERVICAL RADICULOPATHY: Status: ACTIVE | Noted: 2021-06-24

## 2021-06-28 PROBLEM — R29.90 STROKE-LIKE SYMPTOMS: Status: ACTIVE | Noted: 2021-05-19

## 2021-06-28 PROBLEM — I10 HIGH BLOOD PRESSURE: Status: ACTIVE | Noted: 2021-06-28

## 2021-06-28 PROBLEM — M54.9 CHRONIC BACK PAIN: Status: ACTIVE | Noted: 2021-06-28

## 2021-06-28 PROBLEM — M19.90 ARTHRITIS: Status: ACTIVE | Noted: 2021-06-28

## 2021-06-28 PROBLEM — J30.9 ALLERGIC RHINITIS: Status: ACTIVE | Noted: 2021-06-28

## 2021-06-28 PROBLEM — I65.23 CAROTID ATHEROSCLEROSIS, BILATERAL: Status: ACTIVE | Noted: 2020-09-04

## 2021-06-28 PROBLEM — K21.9 ESOPHAGEAL REFLUX: Status: ACTIVE | Noted: 2021-06-28

## 2021-06-28 PROBLEM — E78.00 HIGH CHOLESTEROL: Status: ACTIVE | Noted: 2021-06-28

## 2021-06-28 PROBLEM — R07.9 CHEST PAIN: Status: ACTIVE | Noted: 2021-06-28

## 2021-06-28 PROBLEM — G89.29 CHRONIC BACK PAIN: Status: ACTIVE | Noted: 2021-06-28

## 2021-06-28 PROCEDURE — 99212 OFFICE O/P EST SF 10 MIN: CPT | Performed by: SURGERY

## 2021-06-28 RX ORDER — ESCITALOPRAM OXALATE 20 MG/1
20 TABLET ORAL DAILY
COMMUNITY
End: 2021-12-07

## 2021-06-28 RX ORDER — METOPROLOL SUCCINATE 25 MG/1
TABLET, EXTENDED RELEASE ORAL
COMMUNITY
End: 2021-12-07

## 2021-06-28 RX ORDER — LANCETS 33 GAUGE
EACH MISCELLANEOUS
COMMUNITY
Start: 2021-06-21 | End: 2021-12-07

## 2021-06-28 RX ORDER — BLOOD-GLUCOSE METER
EACH MISCELLANEOUS
Status: ON HOLD | COMMUNITY
Start: 2021-06-21 | End: 2021-07-28

## 2021-06-28 RX ORDER — BLOOD-GLUCOSE METER
EACH MISCELLANEOUS SEE ADMIN INSTRUCTIONS
COMMUNITY
Start: 2021-06-21 | End: 2021-12-07

## 2021-06-28 RX ORDER — BLOOD SUGAR DIAGNOSTIC
STRIP MISCELLANEOUS
COMMUNITY
Start: 2021-06-21 | End: 2021-12-07

## 2021-06-28 RX ORDER — LANCETS 33 GAUGE
EACH MISCELLANEOUS
Status: ON HOLD | COMMUNITY
Start: 2021-06-21 | End: 2021-07-28

## 2021-06-28 RX ORDER — FUROSEMIDE 40 MG/1
40 TABLET ORAL DAILY
COMMUNITY
Start: 2021-05-05 | End: 2021-12-07

## 2021-06-28 RX ORDER — LOSARTAN POTASSIUM 25 MG/1
100 TABLET ORAL NIGHTLY
COMMUNITY
End: 2021-12-07

## 2021-06-28 RX ORDER — CLOPIDOGREL BISULFATE 75 MG/1
75 TABLET ORAL
COMMUNITY

## 2021-06-28 RX ORDER — ATORVASTATIN CALCIUM 20 MG
TABLET ORAL
COMMUNITY
End: 2021-07-27

## 2021-06-28 RX ORDER — METFORMIN HYDROCHLORIDE 500 MG/1
1000 TABLET, EXTENDED RELEASE ORAL DAILY
COMMUNITY
Start: 2021-06-19

## 2021-06-28 RX ORDER — BLOOD SUGAR DIAGNOSTIC
1 STRIP MISCELLANEOUS DAILY
COMMUNITY
Start: 2021-06-21 | End: 2021-12-07

## 2021-06-28 RX ORDER — PANTOPRAZOLE SODIUM 40 MG/1
40 TABLET, DELAYED RELEASE ORAL DAILY
COMMUNITY
Start: 2021-05-05

## 2021-06-28 NOTE — PROGRESS NOTES
Chief Complaint  Hernia (abdominal hernia) and Abdominal Pain    History of Present Illness  Patient has been having some bloating.  He has a bulge in the midline of his abdomen and mention this to his primary care clinician.  The patient says he was told the bulge is a hernia and that this is pushing up on his abdomen and causing the bloating.  Patient was referred to me for a surgical opinion.   Patient has no pain where the bulge is located    Allergies: Sulfa antibiotics      Current Outpatient Medications:   •  Blood Glucose Monitoring Suppl (OneTouch Verio Flex System) w/Device kit, use AS DIRECTED, Disp: , Rfl:   •  glucose blood (OneTouch Verio) test strip, check blood sugar once daily, Disp: , Rfl:   •  Lancets (OneTouch Delica Plus [id number]) misc, check blood sugar once daily (100 day supply), Disp: , Rfl:   •  ASPIRIN 81 PO, Take 81 mg by mouth Daily., Disp: , Rfl:   •  atorvastatin (Lipitor) 20 MG tablet, , Disp: , Rfl:   •  Blood Glucose Monitoring Suppl (OneTouch Verio Flex System) w/Device kit, See Admin Instructions., Disp: , Rfl:   •  clopidogrel (Plavix) 75 MG tablet, Plavix 75 mg oral tablet take 1 tablet (75 mg) by oral route once daily   Active, Disp: , Rfl:   •  escitalopram (LEXAPRO) 20 MG tablet, Take 20 mg by mouth Daily., Disp: , Rfl:   •  furosemide (LASIX) 40 MG tablet, Take 40 mg by mouth Daily., Disp: , Rfl:   •  Lancets (OneTouch Delica Plus [id number]) misc, check blood sugar once daily (100 day supply), Disp: , Rfl:   •  losartan (Cozaar) 25 MG tablet, , Disp: , Rfl:   •  metFORMIN ER (GLUCOPHAGE-XR) 500 MG 24 hr tablet, Take 500 mg by mouth Daily., Disp: , Rfl:   •  metoprolol succinate XL (TOPROL-XL) 25 MG 24 hr tablet, metoprolol succinate 25 mg oral tablet extended release 24 hr take 1 tablet (25 mg) by oral route once daily   Active, Disp: , Rfl:   •  OneTouch Verio test strip, 1 each Daily. Check blood sugar, Disp: , Rfl:   •  pantoprazole (PROTONIX) 40 MG EC tablet, Take 40 mg  "by mouth Daily., Disp: , Rfl:      Medical History: has a past medical history of Allergic rhinitis, Arthritis, Bulging lumbar disc, Chest pain, Chronic back pain, Congestive heart failure (CHF) (CMS/HCC), DM (diabetes mellitus) (CMS/HCC), Dysphagia, Follow-up examination following surgery (10/09/2014), Gastroesophageal reflux disease, GERD (gastroesophageal reflux disease), Heart attack (CMS/HCC), Hemorrhoids, external, High blood pressure, High cholesterol, Lumbago, Mood disorder (CMS/HCC), Prostate disorder, and Shortness of breath.     Surgical History: has a past surgical history that includes Colonoscopy; Esophagogastroduodenoscopy; Heel spur surgery (Bilateral); Inguinal hernia repair (Left); and Shoulder surgery (Bilateral).    Family History: family history includes Cancer in an other family member; Colon cancer in his father and mother; Diabetes in his mother; Esophageal cancer in his brother; Stroke in an other family member.     Social History: reports that he has never smoked. He has never used smokeless tobacco. He reports current alcohol use. He reports that he does not use drugs.    Objective     Vital Signs:  Ht 172.7 cm (68\")   Wt 118 kg (260 lb 6.4 oz)   BMI 39.59 kg/m²   • Constitutional: healthy appearing, alert, no acute distress, reliable historian  • HENT:  NCAT, no visible deformities or lesions  • Eyes:  sclerae clear, conjunctivae clear, EOMI  • Neck:  normal appearance, no masses, trachea midline  • Respiratory:  breathing not labored, respiratory effort appears normal  • Cardiovascular:  heart regular rate and rhythm  • Abdomen:  soft, nontender, nondistended.  Fusiform bulge extending from the subxiphoid area to just above the umbilicus and the bulge is present only when the patient is lying on the exam table and lifting his torso up off of the exam table.  No detectable fascial defect.  • Skin and subcutaneous tissue:  no visible concerning rashes or lesions, no " jaundice  • Musculoskeletal: moving all extremities symmetrically and purposefully  • Neurologic:  no obvious motor or sensory deficits, normal gait, able to stand without difficulty, cerebellar function without any obvious abnormalities, alert & oriented x 3, speech clear  • Psychiatric:  judgment and insight intact, mood normal, affect appropriate, cooperative    Result Review :     []  Laboratory  []  Microbiology  []  Radiology  []  EKG/Telemetry   []  Cardiology/Vascular   []  Pathology  []  Old records                Assessment:  Diastasis recti    Plan:  Diagnosis was discussed with the patient.  Patient was also given educational material about the diagnosis.  Patient does not have an abdominal wall hernia.  In general, diastases recti is a cosmetic issue and the patient does not have any cosmetic concerns.  No surgical issues to address.  Patient will see me as needed.    Jonas Barriga MD  06/28/2021    Electronically signed by Jonas Barriga MD, 06/28/21, 2:25 PM EDT.

## 2021-07-13 ENCOUNTER — LAB (OUTPATIENT)
Dept: LAB | Facility: HOSPITAL | Age: 66
End: 2021-07-13

## 2021-07-13 ENCOUNTER — TRANSCRIBE ORDERS (OUTPATIENT)
Dept: ADMINISTRATIVE | Facility: HOSPITAL | Age: 66
End: 2021-07-13

## 2021-07-13 DIAGNOSIS — Z95.0 CARDIAC PACEMAKER IN SITU: ICD-10-CM

## 2021-07-13 DIAGNOSIS — R53.83 TIREDNESS: ICD-10-CM

## 2021-07-13 DIAGNOSIS — R07.9 CHEST PAIN, UNSPECIFIED TYPE: ICD-10-CM

## 2021-07-13 DIAGNOSIS — R07.9 CHEST PAIN, UNSPECIFIED TYPE: Primary | ICD-10-CM

## 2021-07-13 LAB
ALBUMIN SERPL-MCNC: 4.4 G/DL (ref 3.5–5.2)
ALBUMIN/GLOB SERPL: 1.8 G/DL
ALP SERPL-CCNC: 55 U/L (ref 39–117)
ALT SERPL W P-5'-P-CCNC: 14 U/L (ref 1–41)
ANION GAP SERPL CALCULATED.3IONS-SCNC: 11.2 MMOL/L (ref 5–15)
AST SERPL-CCNC: 10 U/L (ref 1–40)
BILIRUB SERPL-MCNC: 0.6 MG/DL (ref 0–1.2)
BUN SERPL-MCNC: 20 MG/DL (ref 8–23)
BUN/CREAT SERPL: 18.3 (ref 7–25)
CALCIUM SPEC-SCNC: 9 MG/DL (ref 8.6–10.5)
CHLORIDE SERPL-SCNC: 103 MMOL/L (ref 98–107)
CHOLEST SERPL-MCNC: 159 MG/DL (ref 0–200)
CO2 SERPL-SCNC: 23.8 MMOL/L (ref 22–29)
CREAT SERPL-MCNC: 1.09 MG/DL (ref 0.76–1.27)
GFR SERPL CREATININE-BSD FRML MDRD: 68 ML/MIN/1.73
GLOBULIN UR ELPH-MCNC: 2.4 GM/DL
GLUCOSE SERPL-MCNC: 137 MG/DL (ref 65–99)
HDLC SERPL-MCNC: 26 MG/DL (ref 40–60)
LDLC SERPL CALC-MCNC: 88 MG/DL (ref 0–100)
LDLC/HDLC SERPL: 3.03 {RATIO}
POTASSIUM SERPL-SCNC: 4.1 MMOL/L (ref 3.5–5.2)
PROT SERPL-MCNC: 6.8 G/DL (ref 6–8.5)
SODIUM SERPL-SCNC: 138 MMOL/L (ref 136–145)
TRIGL SERPL-MCNC: 271 MG/DL (ref 0–150)
VLDLC SERPL-MCNC: 45 MG/DL (ref 5–40)

## 2021-07-13 PROCEDURE — 80053 COMPREHEN METABOLIC PANEL: CPT

## 2021-07-13 PROCEDURE — 84436 ASSAY OF TOTAL THYROXINE: CPT

## 2021-07-13 PROCEDURE — 80061 LIPID PANEL: CPT

## 2021-07-13 PROCEDURE — 36415 COLL VENOUS BLD VENIPUNCTURE: CPT

## 2021-07-13 PROCEDURE — 84443 ASSAY THYROID STIM HORMONE: CPT

## 2021-07-14 LAB
T4 SERPL-MCNC: 6.34 MCG/DL (ref 4.5–11.7)
TSH SERPL DL<=0.05 MIU/L-ACNC: 0.81 UIU/ML (ref 0.27–4.2)

## 2021-07-15 VITALS — RESPIRATION RATE: 16 BRPM | WEIGHT: 258 LBS | HEIGHT: 68 IN | BODY MASS INDEX: 39.1 KG/M2

## 2021-07-27 RX ORDER — GABAPENTIN 300 MG/1
300 CAPSULE ORAL 4 TIMES DAILY
COMMUNITY
End: 2021-12-07

## 2021-07-27 RX ORDER — ATORVASTATIN CALCIUM 40 MG/1
40 TABLET, FILM COATED ORAL DAILY
COMMUNITY

## 2021-07-28 ENCOUNTER — HOSPITAL ENCOUNTER (OUTPATIENT)
Facility: HOSPITAL | Age: 66
Setting detail: HOSPITAL OUTPATIENT SURGERY
Discharge: HOME | End: 2021-07-28
Attending: SURGERY | Admitting: SURGERY

## 2021-07-28 ENCOUNTER — ANESTHESIA EVENT (OUTPATIENT)
Dept: GASTROENTEROLOGY | Facility: HOSPITAL | Age: 66
End: 2021-07-28

## 2021-07-28 ENCOUNTER — ANESTHESIA (OUTPATIENT)
Dept: GASTROENTEROLOGY | Facility: HOSPITAL | Age: 66
End: 2021-07-28

## 2021-07-28 VITALS
RESPIRATION RATE: 15 BRPM | OXYGEN SATURATION: 98 % | TEMPERATURE: 97.6 F | WEIGHT: 253.53 LBS | DIASTOLIC BLOOD PRESSURE: 69 MMHG | SYSTOLIC BLOOD PRESSURE: 98 MMHG | BODY MASS INDEX: 38.55 KG/M2 | HEART RATE: 64 BPM

## 2021-07-28 DIAGNOSIS — Z80.0 FAMILY HISTORY OF COLON CANCER: ICD-10-CM

## 2021-07-28 DIAGNOSIS — K21.9 GERD (GASTROESOPHAGEAL REFLUX DISEASE): ICD-10-CM

## 2021-07-28 LAB — GLUCOSE BLDC GLUCOMTR-MCNC: 130 MG/DL (ref 70–99)

## 2021-07-28 PROCEDURE — 25010000002 PROPOFOL 10 MG/ML EMULSION: Performed by: NURSE ANESTHETIST, CERTIFIED REGISTERED

## 2021-07-28 PROCEDURE — 88305 TISSUE EXAM BY PATHOLOGIST: CPT | Performed by: SURGERY

## 2021-07-28 PROCEDURE — 82962 GLUCOSE BLOOD TEST: CPT

## 2021-07-28 PROCEDURE — 43239 EGD BIOPSY SINGLE/MULTIPLE: CPT | Performed by: SURGERY

## 2021-07-28 PROCEDURE — 88342 IMHCHEM/IMCYTCHM 1ST ANTB: CPT | Performed by: SURGERY

## 2021-07-28 PROCEDURE — 45385 COLONOSCOPY W/LESION REMOVAL: CPT | Performed by: SURGERY

## 2021-07-28 RX ORDER — SODIUM CHLORIDE, SODIUM LACTATE, POTASSIUM CHLORIDE, CALCIUM CHLORIDE 600; 310; 30; 20 MG/100ML; MG/100ML; MG/100ML; MG/100ML
30 INJECTION, SOLUTION INTRAVENOUS CONTINUOUS
Status: DISCONTINUED | OUTPATIENT
Start: 2021-07-28 | End: 2021-07-28 | Stop reason: HOSPADM

## 2021-07-28 RX ORDER — SODIUM CHLORIDE, SODIUM LACTATE, POTASSIUM CHLORIDE, CALCIUM CHLORIDE 600; 310; 30; 20 MG/100ML; MG/100ML; MG/100ML; MG/100ML
INJECTION, SOLUTION INTRAVENOUS CONTINUOUS PRN
Status: DISCONTINUED | OUTPATIENT
Start: 2021-07-28 | End: 2021-07-28 | Stop reason: SURG

## 2021-07-28 RX ORDER — GLYCOPYRROLATE 0.2 MG/ML
INJECTION INTRAMUSCULAR; INTRAVENOUS AS NEEDED
Status: DISCONTINUED | OUTPATIENT
Start: 2021-07-28 | End: 2021-07-28 | Stop reason: SURG

## 2021-07-28 RX ORDER — LIDOCAINE HYDROCHLORIDE 20 MG/ML
INJECTION, SOLUTION INFILTRATION; PERINEURAL AS NEEDED
Status: DISCONTINUED | OUTPATIENT
Start: 2021-07-28 | End: 2021-07-28 | Stop reason: SURG

## 2021-07-28 RX ADMIN — GLYCOPYRROLATE 0.1 MCG: 0.2 INJECTION INTRAMUSCULAR; INTRAVENOUS at 08:37

## 2021-07-28 RX ADMIN — SODIUM CHLORIDE, POTASSIUM CHLORIDE, SODIUM LACTATE AND CALCIUM CHLORIDE: 600; 310; 30; 20 INJECTION, SOLUTION INTRAVENOUS at 08:33

## 2021-07-28 RX ADMIN — SODIUM CHLORIDE, POTASSIUM CHLORIDE, SODIUM LACTATE AND CALCIUM CHLORIDE 30 ML/HR: 600; 310; 30; 20 INJECTION, SOLUTION INTRAVENOUS at 07:37

## 2021-07-28 RX ADMIN — LIDOCAINE HYDROCHLORIDE 100 MG: 20 INJECTION, SOLUTION INFILTRATION; PERINEURAL at 08:42

## 2021-07-28 RX ADMIN — PROPOFOL 150 MCG/KG/MIN: 10 INJECTION, EMULSION INTRAVENOUS at 08:43

## 2021-07-28 NOTE — ANESTHESIA PREPROCEDURE EVALUATION
" Anesthesia Evaluation     Patient summary reviewed and Nursing notes reviewed   no history of anesthetic complications:  NPO Solid Status: > 8 hours  NPO Liquid Status: > 2 hours           Airway   Dental      Pulmonary    (+) shortness of breath,   Cardiovascular   Exercise tolerance: good (4-7 METS)    (+) hypertension, past MI , CAD, CHF , hyperlipidemia,     ROS comment: Cath 2019,EF nl, CAD  Saw cardiology last week,\"all ok\"    Neuro/Psych  (+) CVA,     GI/Hepatic/Renal/Endo    (+)   diabetes mellitus,     Musculoskeletal (-) negative ROS    Abdominal    Substance History - negative use     OB/GYN negative ob/gyn ROS         Other - negative ROS                       Anesthesia Plan    ASA 3     general   total IV anesthesia    Anesthetic plan, all risks, benefits, and alternatives have been provided, discussed and informed consent has been obtained with: patient.      "

## 2021-07-28 NOTE — ANESTHESIA POSTPROCEDURE EVALUATION
Patient: Ricky W Sturgeon    Procedure Summary     Date: 07/28/21 Room / Location: [facility] ENDOSCOPY 1 / [facility] ENDOSCOPY    Anesthesia Start: 0839 Anesthesia Stop: 0930    Procedures:       ESOPHAGOGASTRODUODENOSCOPY WITH BIOPSIES (N/A )      COLONOSCOPY WITH POLYPECTOMY (N/A ) Diagnosis: (GERD, BARRETTS ESOPHAGUS, FAM HX COLON CANCER)    Surgeons: Jonas Barriga MD Provider: Omid Reynolds MD    Anesthesia Type: general ASA Status: 3          Anesthesia Type: general    Vitals  Vitals Value Taken Time   BP 98/69 07/28/21 0944   Temp 36.4 °C (97.6 °F) 07/28/21 0944   Pulse 64 07/28/21 0944   Resp 15 07/28/21 0944   SpO2 98 % 07/28/21 0944           Post Anesthesia Care and Evaluation    Patient location during evaluation: bedside  Patient participation: complete - patient participated  Level of consciousness: awake  Pain score: 0  Pain management: adequate  Airway patency: patent  Anesthetic complications: No anesthetic complications  PONV Status: none  Cardiovascular status: acceptable and stable  Respiratory status: acceptable and room air  Hydration status: acceptable    Comments: An Anesthesiologist personally participated in the most demanding procedures (including induction and emergence if applicable) in the anesthesia plan, monitored the course of anesthesia administration at frequent intervals and remained physically present and available for immediate diagnosis and treatment of emergencies.

## 2021-07-29 LAB
CYTO UR: NORMAL
LAB AP CASE REPORT: NORMAL
LAB AP CLINICAL INFORMATION: NORMAL
LAB AP SPECIAL STAINS: NORMAL
PATH REPORT.FINAL DX SPEC: NORMAL
PATH REPORT.GROSS SPEC: NORMAL

## 2021-10-20 ENCOUNTER — FLU SHOT (OUTPATIENT)
Dept: FAMILY MEDICINE CLINIC | Facility: CLINIC | Age: 66
End: 2021-10-20

## 2021-10-20 DIAGNOSIS — Z23 NEED FOR INFLUENZA VACCINATION: Primary | ICD-10-CM

## 2021-10-20 PROCEDURE — 90662 IIV NO PRSV INCREASED AG IM: CPT | Performed by: NURSE PRACTITIONER

## 2021-10-20 PROCEDURE — 90471 IMMUNIZATION ADMIN: CPT | Performed by: NURSE PRACTITIONER

## 2021-11-06 ENCOUNTER — APPOINTMENT (OUTPATIENT)
Dept: CT IMAGING | Facility: HOSPITAL | Age: 66
End: 2021-11-06

## 2021-11-06 ENCOUNTER — HOSPITAL ENCOUNTER (EMERGENCY)
Facility: HOSPITAL | Age: 66
Discharge: HOME | End: 2021-11-06
Attending: EMERGENCY MEDICINE | Admitting: EMERGENCY MEDICINE

## 2021-11-06 VITALS
TEMPERATURE: 97.9 F | SYSTOLIC BLOOD PRESSURE: 129 MMHG | BODY MASS INDEX: 39.64 KG/M2 | HEART RATE: 67 BPM | WEIGHT: 267.64 LBS | HEIGHT: 69 IN | OXYGEN SATURATION: 93 % | DIASTOLIC BLOOD PRESSURE: 85 MMHG | RESPIRATION RATE: 18 BRPM

## 2021-11-06 DIAGNOSIS — R51.9 NONINTRACTABLE EPISODIC HEADACHE, UNSPECIFIED HEADACHE TYPE: Primary | ICD-10-CM

## 2021-11-06 LAB
ALBUMIN SERPL-MCNC: 4.3 G/DL (ref 3.5–5.2)
ALBUMIN/GLOB SERPL: 1.4 G/DL
ALP SERPL-CCNC: 76 U/L (ref 39–117)
ALT SERPL W P-5'-P-CCNC: 24 U/L (ref 1–41)
ANION GAP SERPL CALCULATED.3IONS-SCNC: 11.8 MMOL/L (ref 5–15)
AST SERPL-CCNC: 18 U/L (ref 1–40)
BASOPHILS # BLD AUTO: 0.07 10*3/MM3 (ref 0–0.2)
BASOPHILS NFR BLD AUTO: 1.1 % (ref 0–1.5)
BILIRUB SERPL-MCNC: 0.5 MG/DL (ref 0–1.2)
BUN SERPL-MCNC: 19 MG/DL (ref 8–23)
BUN/CREAT SERPL: 16.4 (ref 7–25)
CALCIUM SPEC-SCNC: 9.3 MG/DL (ref 8.6–10.5)
CHLORIDE SERPL-SCNC: 101 MMOL/L (ref 98–107)
CO2 SERPL-SCNC: 27.2 MMOL/L (ref 22–29)
CREAT SERPL-MCNC: 1.16 MG/DL (ref 0.76–1.27)
DEPRECATED RDW RBC AUTO: 42.4 FL (ref 37–54)
EOSINOPHIL # BLD AUTO: 0.23 10*3/MM3 (ref 0–0.4)
EOSINOPHIL NFR BLD AUTO: 3.5 % (ref 0.3–6.2)
ERYTHROCYTE [DISTWIDTH] IN BLOOD BY AUTOMATED COUNT: 12.4 % (ref 12.3–15.4)
GFR SERPL CREATININE-BSD FRML MDRD: 63 ML/MIN/1.73
GLOBULIN UR ELPH-MCNC: 3 GM/DL
GLUCOSE SERPL-MCNC: 149 MG/DL (ref 65–99)
HCT VFR BLD AUTO: 40.3 % (ref 37.5–51)
HGB BLD-MCNC: 13.9 G/DL (ref 13–17.7)
IMM GRANULOCYTES # BLD AUTO: 0.02 10*3/MM3 (ref 0–0.05)
IMM GRANULOCYTES NFR BLD AUTO: 0.3 % (ref 0–0.5)
LYMPHOCYTES # BLD AUTO: 2.58 10*3/MM3 (ref 0.7–3.1)
LYMPHOCYTES NFR BLD AUTO: 38.9 % (ref 19.6–45.3)
MCH RBC QN AUTO: 32.4 PG (ref 26.6–33)
MCHC RBC AUTO-ENTMCNC: 34.5 G/DL (ref 31.5–35.7)
MCV RBC AUTO: 93.9 FL (ref 79–97)
MONOCYTES # BLD AUTO: 0.63 10*3/MM3 (ref 0.1–0.9)
MONOCYTES NFR BLD AUTO: 9.5 % (ref 5–12)
NEUTROPHILS NFR BLD AUTO: 3.1 10*3/MM3 (ref 1.7–7)
NEUTROPHILS NFR BLD AUTO: 46.7 % (ref 42.7–76)
NRBC BLD AUTO-RTO: 0 /100 WBC (ref 0–0.2)
PLATELET # BLD AUTO: 266 10*3/MM3 (ref 140–450)
PMV BLD AUTO: 9.9 FL (ref 6–12)
POTASSIUM SERPL-SCNC: 4.1 MMOL/L (ref 3.5–5.2)
PROT SERPL-MCNC: 7.3 G/DL (ref 6–8.5)
RBC # BLD AUTO: 4.29 10*6/MM3 (ref 4.14–5.8)
SODIUM SERPL-SCNC: 140 MMOL/L (ref 136–145)
WBC # BLD AUTO: 6.63 10*3/MM3 (ref 3.4–10.8)

## 2021-11-06 PROCEDURE — 25010000002 DIPHENHYDRAMINE PER 50 MG: Performed by: EMERGENCY MEDICINE

## 2021-11-06 PROCEDURE — 25010000002 PROCHLORPERAZINE 10 MG/2ML SOLUTION: Performed by: EMERGENCY MEDICINE

## 2021-11-06 PROCEDURE — 93005 ELECTROCARDIOGRAM TRACING: CPT | Performed by: EMERGENCY MEDICINE

## 2021-11-06 PROCEDURE — 96374 THER/PROPH/DIAG INJ IV PUSH: CPT

## 2021-11-06 PROCEDURE — 99283 EMERGENCY DEPT VISIT LOW MDM: CPT

## 2021-11-06 PROCEDURE — 96375 TX/PRO/DX INJ NEW DRUG ADDON: CPT

## 2021-11-06 PROCEDURE — 70450 CT HEAD/BRAIN W/O DYE: CPT

## 2021-11-06 PROCEDURE — 80053 COMPREHEN METABOLIC PANEL: CPT | Performed by: EMERGENCY MEDICINE

## 2021-11-06 PROCEDURE — 85025 COMPLETE CBC W/AUTO DIFF WBC: CPT | Performed by: EMERGENCY MEDICINE

## 2021-11-06 RX ORDER — DIPHENHYDRAMINE HYDROCHLORIDE 50 MG/ML
12.5 INJECTION INTRAMUSCULAR; INTRAVENOUS ONCE
Status: COMPLETED | OUTPATIENT
Start: 2021-11-06 | End: 2021-11-06

## 2021-11-06 RX ORDER — PROCHLORPERAZINE EDISYLATE 5 MG/ML
2.5 INJECTION INTRAMUSCULAR; INTRAVENOUS EVERY 6 HOURS PRN
Status: DISCONTINUED | OUTPATIENT
Start: 2021-11-06 | End: 2021-11-06

## 2021-11-06 RX ORDER — PROCHLORPERAZINE EDISYLATE 5 MG/ML
2.5 INJECTION INTRAMUSCULAR; INTRAVENOUS ONCE
Status: COMPLETED | OUTPATIENT
Start: 2021-11-06 | End: 2021-11-06

## 2021-11-06 RX ORDER — HYDROCODONE BITARTRATE AND ACETAMINOPHEN 5; 325 MG/1; MG/1
1 TABLET ORAL 2 TIMES DAILY
COMMUNITY

## 2021-11-06 RX ADMIN — DIPHENHYDRAMINE HYDROCHLORIDE 12.5 MG: 50 INJECTION, SOLUTION INTRAMUSCULAR; INTRAVENOUS at 17:43

## 2021-11-06 RX ADMIN — PROCHLORPERAZINE EDISYLATE 2.5 MG: 5 INJECTION INTRAMUSCULAR; INTRAVENOUS at 17:49

## 2021-11-06 NOTE — ED PROVIDER NOTES
"Subjective   Ricky W Sturgeon is a 66 y.o. male that presents to the ED via private vehicle accompanied by spouse for NECK PAIN and HA that have been \"going on for a while\". These symptoms are chronic and pt has been seen by pain management for it. He has tried hydrocodone and gabapentin for symptoms with no relief. He is scheduled for an epidural injection on Thursday. Pt rates the HA as a 5 out of 10 \"all the time\". Nothing improves or worsens symptoms.     Spouse notes that pt has hx of TIA. She states that in May 2021 while the pt was at work, he had an episode of slurred speech, confusion, and unilateral weakness. Spouse was seen at [place] and CVA was ruled out. He was diagnosed with a pinched nerve in his neck. Spouse states that pt has had symptoms since, stating he has \"spells\" 2-3 times a week and is concerned for CVA. Spouse notes that he staggers at times. Of note, pt also follows up with \"sp[facility]\" in [place].     Pt does have a pacemaker and has not been able to have an MRI for symptoms secondary to the pacemaker. Pt is on aspirin and Plavix following MI a few years ago.       History provided by:  Patient and spouse      Review of Systems   Constitutional: Negative for chills, diaphoresis and fever.   HENT: Negative for ear discharge and nosebleeds.    Eyes: Negative for photophobia.   Respiratory: Negative for shortness of breath.    Cardiovascular: Negative for chest pain.   Gastrointestinal: Negative for diarrhea, nausea and vomiting.   Genitourinary: Negative for dysuria.   Musculoskeletal: Positive for neck pain. Negative for back pain.   Skin: Negative for rash.   Neurological: Positive for headaches.   All other systems reviewed and are negative.      Past Medical History:   Diagnosis Date   • Allergic rhinitis    • Arthritis    • Bulging lumbar disc    • Chest pain    • Chronic back pain    • Congestive heart failure (CHF) (HCC)    • DM (diabetes mellitus) ([facility])     type 2   • " Dysphagia    • Elevated cholesterol    • Follow-up examination following surgery 10/09/2014   • Gastroesophageal reflux disease    • GERD (gastroesophageal reflux disease)    • Heart attack (HCC)     7/2019   • Hemorrhoids, external    • High blood pressure    • High cholesterol    • Lumbago     LOW BACK PAIN   • Mood disorder (HCC)    • Prostate disorder    • Shortness of breath    • Sleep apnea    • TIA (transient ischemic attack)     unable to do MRI to confirm       Allergies   Allergen Reactions   • Sulfa Antibiotics Angioedema       Past Surgical History:   Procedure Laterality Date   • COLONOSCOPY     • COLONOSCOPY N/A 7/28/2021    Procedure: COLONOSCOPY WITH POLYPECTOMY;  Surgeon: Jonas Barriga MD;  Location: [facility] ENDOSCOPY;  Service: General;  Laterality: N/A;  DIVERTICULOSIS, COLON POLYPS   • CORONARY ANGIOPLASTY WITH STENT PLACEMENT     • ENDOSCOPY     • ENDOSCOPY N/A 7/28/2021    Procedure: ESOPHAGOGASTRODUODENOSCOPY WITH BIOPSIES;  Surgeon: Jonas Barriga MD;  Location: [facility] ENDOSCOPY;  Service: General;  Laterality: N/A;  GASTRITIS   • HEEL SPUR SURGERY Bilateral     HEEL SPURS REMOVED FROM BOTH HEELS   • INGUINAL HERNIA REPAIR Left    • PACEMAKER IMPLANTATION     • SHOULDER SURGERY Bilateral        Family History   Problem Relation Age of Onset   • Colon cancer Mother         70S   • Diabetes Mother    • Colon cancer Father         70S   • Stroke Other    • Cancer Other    • Esophageal cancer Brother    • Malig Hyperthermia Neg Hx        Social History     Socioeconomic History   • Marital status:    Tobacco Use   • Smoking status: Never Smoker   • Smokeless tobacco: Never Used   Vaping Use   • Vaping Use: Never used   Substance and Sexual Activity   • Alcohol use: Not Currently   • Drug use: Never   • Sexual activity: Defer         Objective   Physical Exam  Vitals and nursing note reviewed.   Constitutional:       General: He is not in acute distress.     Appearance: Normal  appearance.   HENT:      Head: Normocephalic and atraumatic.      Nose: Nose normal.   Eyes:      General: No visual field deficit or scleral icterus.  Cardiovascular:      Rate and Rhythm: Normal rate.   Pulmonary:      Effort: Pulmonary effort is normal. No respiratory distress.   Abdominal:      General: There is no distension.   Musculoskeletal:         General: Normal range of motion.      Cervical back: Neck supple.      Right lower leg: No edema.      Left lower leg: No edema.   Skin:     General: Skin is warm and dry.   Neurological:      General: No focal deficit present.      Mental Status: He is alert and oriented to person, place, and time.      Cranial Nerves: No dysarthria.      Sensory: Sensation is intact. No sensory deficit.      Motor: Motor function is intact. No weakness.      Coordination: Finger-Nose-Finger Test normal.       Procedures         ED Course  ED Course as of 11/06/21 2121   Sat Nov 06, 2021   1607 EKG: Atrial paced rhythm with a rate of 67. Nonspecific ST changes. Normal P waves and QRS. Unchanged from previous EKG.  [KJ]      ED Course User Index  [KJ] Bell Phillips                                            [facility]  Number of Diagnoses or Management Options     Amount and/or Complexity of Data Reviewed  Clinical lab tests: reviewed  Tests in the radiology section of CPT®: reviewed  Tests in the medicine section of CPT®: reviewed  Obtain history from someone other than the patient: yes (Spouse )  Review and summarize past medical records: yes (Pt sees pain management for chronic low back pain. Pt was seen at [facility] last month for further evaluation of back pain. Pt last saw pain management on 10/25/21 and received a steroid injection in his neck. )      Differential diagnosis includes migraine headache, tension headache, cervical strain, and chronic pain from spinal stenosis among others.    Cardiac monitor interpretation: Normal sinus rhythm. Rate 69.    Pulse oximetry  interpretation: 93% SPO2 on room air at rest. Normal.    Patient's work-up indicates an essentially normal CBC and comprehensive metabolic panel. The patient's head CT which was specifically requested by his wife is essentially normal. Doubt the need for MRI. Doubt CVA.    On reevaluation after Compazine and Benadryl the patient's headache is much improved and he is still fully awake and alert. He will continue to follow-up with pain management/neurosurgery as planned.    Final diagnoses:   Nonintractable episodic headache, unspecified headache type       Documentation assistance provided by shima Phillips.  Information recorded by the scribe was done at my direction and has been verified and validated by me.     Bell Phillips  11/06/21 1524       Barney Sweeney DO  11/06/21 2126

## 2021-11-06 NOTE — ED TRIAGE NOTES
Pt states that he is in pain management for his back and has been back and fourth between here and [facility] dealing with fatigue/headaches over the last 8 months, states that last night he started having shooting pains in his head and pain instructed to come to here.

## 2021-11-07 LAB — QT INTERVAL: 411 MS

## 2021-12-03 ENCOUNTER — TRANSCRIBE ORDERS (OUTPATIENT)
Dept: ADMINISTRATIVE | Facility: HOSPITAL | Age: 66
End: 2021-12-03

## 2021-12-03 ENCOUNTER — APPOINTMENT (OUTPATIENT)
Dept: CT IMAGING | Facility: HOSPITAL | Age: 66
End: 2021-12-03

## 2021-12-03 DIAGNOSIS — R07.9 CHEST PAIN, UNSPECIFIED TYPE: ICD-10-CM

## 2021-12-03 DIAGNOSIS — R06.02 SOB (SHORTNESS OF BREATH): Primary | ICD-10-CM

## 2021-12-03 DIAGNOSIS — R79.89 POSITIVE D DIMER: ICD-10-CM

## 2021-12-07 ENCOUNTER — APPOINTMENT (OUTPATIENT)
Dept: CT IMAGING | Facility: HOSPITAL | Age: 66
End: 2021-12-07

## 2021-12-07 ENCOUNTER — HOSPITAL ENCOUNTER (EMERGENCY)
Facility: HOSPITAL | Age: 66
Discharge: HOME | End: 2021-12-07
Attending: EMERGENCY MEDICINE | Admitting: EMERGENCY MEDICINE

## 2021-12-07 ENCOUNTER — APPOINTMENT (OUTPATIENT)
Dept: GENERAL RADIOLOGY | Facility: HOSPITAL | Age: 66
End: 2021-12-07

## 2021-12-07 VITALS
DIASTOLIC BLOOD PRESSURE: 85 MMHG | BODY MASS INDEX: 40.86 KG/M2 | RESPIRATION RATE: 16 BRPM | HEART RATE: 63 BPM | OXYGEN SATURATION: 95 % | SYSTOLIC BLOOD PRESSURE: 131 MMHG | WEIGHT: 269.62 LBS | HEIGHT: 68 IN | TEMPERATURE: 97.9 F

## 2021-12-07 DIAGNOSIS — R53.1 GENERALIZED WEAKNESS: ICD-10-CM

## 2021-12-07 DIAGNOSIS — G89.29 CHRONIC NECK PAIN: ICD-10-CM

## 2021-12-07 DIAGNOSIS — M54.50 ACUTE BILATERAL LOW BACK PAIN WITHOUT SCIATICA: Primary | ICD-10-CM

## 2021-12-07 DIAGNOSIS — M54.2 CHRONIC NECK PAIN: ICD-10-CM

## 2021-12-07 LAB
ABO GROUP BLD: NORMAL
ALBUMIN SERPL-MCNC: 4.5 G/DL (ref 3.5–5.2)
ALBUMIN/GLOB SERPL: 1.7 G/DL
ALP SERPL-CCNC: 71 U/L (ref 39–117)
ALT SERPL W P-5'-P-CCNC: 28 U/L (ref 1–41)
ANION GAP SERPL CALCULATED.3IONS-SCNC: 11.8 MMOL/L (ref 5–15)
APTT PPP: 23 SECONDS (ref 22.2–34.2)
AST SERPL-CCNC: 19 U/L (ref 1–40)
BASOPHILS # BLD AUTO: 0.06 10*3/MM3 (ref 0–0.2)
BASOPHILS NFR BLD AUTO: 0.8 % (ref 0–1.5)
BILIRUB SERPL-MCNC: 0.6 MG/DL (ref 0–1.2)
BILIRUB UR QL STRIP: NEGATIVE
BUN SERPL-MCNC: 21 MG/DL (ref 8–23)
BUN/CREAT SERPL: 17.8 (ref 7–25)
CALCIUM SPEC-SCNC: 9.2 MG/DL (ref 8.6–10.5)
CHLORIDE SERPL-SCNC: 105 MMOL/L (ref 98–107)
CLARITY UR: CLEAR
CO2 SERPL-SCNC: 24.2 MMOL/L (ref 22–29)
COLOR UR: YELLOW
CREAT BLDA-MCNC: 1.1 MG/DL
CREAT SERPL-MCNC: 1.18 MG/DL (ref 0.76–1.27)
DEPRECATED RDW RBC AUTO: 43.8 FL (ref 37–54)
EOSINOPHIL # BLD AUTO: 0.26 10*3/MM3 (ref 0–0.4)
EOSINOPHIL NFR BLD AUTO: 3.6 % (ref 0.3–6.2)
ERYTHROCYTE [DISTWIDTH] IN BLOOD BY AUTOMATED COUNT: 12.9 % (ref 12.3–15.4)
GFR SERPL CREATININE-BSD FRML MDRD: 62 ML/MIN/1.73
GLOBULIN UR ELPH-MCNC: 2.6 GM/DL
GLUCOSE BLDC GLUCOMTR-MCNC: 111 MG/DL (ref 70–99)
GLUCOSE SERPL-MCNC: 111 MG/DL (ref 65–99)
GLUCOSE UR STRIP-MCNC: NEGATIVE MG/DL
HCT VFR BLD AUTO: 37.5 % (ref 37.5–51)
HGB BLD-MCNC: 13.2 G/DL (ref 13–17.7)
HGB UR QL STRIP.AUTO: NEGATIVE
HOLD SPECIMEN: NORMAL
IMM GRANULOCYTES # BLD AUTO: 0.05 10*3/MM3 (ref 0–0.05)
IMM GRANULOCYTES NFR BLD AUTO: 0.7 % (ref 0–0.5)
INR PPP: 0.99 (ref 2–3)
KETONES UR QL STRIP: NEGATIVE
LEUKOCYTE ESTERASE UR QL STRIP.AUTO: NEGATIVE
LYMPHOCYTES # BLD AUTO: 2.31 10*3/MM3 (ref 0.7–3.1)
LYMPHOCYTES NFR BLD AUTO: 32.4 % (ref 19.6–45.3)
MCH RBC QN AUTO: 32.8 PG (ref 26.6–33)
MCHC RBC AUTO-ENTMCNC: 35.2 G/DL (ref 31.5–35.7)
MCV RBC AUTO: 93.3 FL (ref 79–97)
MONOCYTES # BLD AUTO: 0.65 10*3/MM3 (ref 0.1–0.9)
MONOCYTES NFR BLD AUTO: 9.1 % (ref 5–12)
NEUTROPHILS NFR BLD AUTO: 3.81 10*3/MM3 (ref 1.7–7)
NEUTROPHILS NFR BLD AUTO: 53.4 % (ref 42.7–76)
NITRITE UR QL STRIP: NEGATIVE
NRBC BLD AUTO-RTO: 0 /100 WBC (ref 0–0.2)
PH UR STRIP.AUTO: 5.5 [PH] (ref 5–8)
PLATELET # BLD AUTO: 253 10*3/MM3 (ref 140–450)
PMV BLD AUTO: 9.9 FL (ref 6–12)
POTASSIUM SERPL-SCNC: 3.9 MMOL/L (ref 3.5–5.2)
PROT SERPL-MCNC: 7.1 G/DL (ref 6–8.5)
PROT UR QL STRIP: NEGATIVE
PROTHROMBIN TIME: 10.4 SECONDS (ref 9.4–12)
QT INTERVAL: 429 MS
RBC # BLD AUTO: 4.02 10*6/MM3 (ref 4.14–5.8)
RH BLD: POSITIVE
SODIUM SERPL-SCNC: 141 MMOL/L (ref 136–145)
SP GR UR STRIP: 1.02 (ref 1–1.03)
TROPONIN T SERPL-MCNC: <0.01 NG/ML (ref 0–0.03)
UROBILINOGEN UR QL STRIP: NORMAL
WBC NRBC COR # BLD: 7.14 10*3/MM3 (ref 3.4–10.8)
WHOLE BLOOD HOLD SPECIMEN: NORMAL
WHOLE BLOOD HOLD SPECIMEN: NORMAL

## 2021-12-07 PROCEDURE — 93010 ELECTROCARDIOGRAM REPORT: CPT | Performed by: INTERNAL MEDICINE

## 2021-12-07 PROCEDURE — 86901 BLOOD TYPING SEROLOGIC RH(D): CPT

## 2021-12-07 PROCEDURE — 85610 PROTHROMBIN TIME: CPT | Performed by: EMERGENCY MEDICINE

## 2021-12-07 PROCEDURE — 99284 EMERGENCY DEPT VISIT MOD MDM: CPT

## 2021-12-07 PROCEDURE — 71045 X-RAY EXAM CHEST 1 VIEW: CPT

## 2021-12-07 PROCEDURE — 70498 CT ANGIOGRAPHY NECK: CPT

## 2021-12-07 PROCEDURE — 70496 CT ANGIOGRAPHY HEAD: CPT

## 2021-12-07 PROCEDURE — 80053 COMPREHEN METABOLIC PANEL: CPT | Performed by: EMERGENCY MEDICINE

## 2021-12-07 PROCEDURE — 70450 CT HEAD/BRAIN W/O DYE: CPT

## 2021-12-07 PROCEDURE — 85025 COMPLETE CBC W/AUTO DIFF WBC: CPT | Performed by: EMERGENCY MEDICINE

## 2021-12-07 PROCEDURE — 84484 ASSAY OF TROPONIN QUANT: CPT | Performed by: EMERGENCY MEDICINE

## 2021-12-07 PROCEDURE — 82962 GLUCOSE BLOOD TEST: CPT

## 2021-12-07 PROCEDURE — 36415 COLL VENOUS BLD VENIPUNCTURE: CPT

## 2021-12-07 PROCEDURE — 82565 ASSAY OF CREATININE: CPT

## 2021-12-07 PROCEDURE — 0 IOPAMIDOL PER 1 ML: Performed by: EMERGENCY MEDICINE

## 2021-12-07 PROCEDURE — 85730 THROMBOPLASTIN TIME PARTIAL: CPT | Performed by: EMERGENCY MEDICINE

## 2021-12-07 PROCEDURE — 86900 BLOOD TYPING SEROLOGIC ABO: CPT

## 2021-12-07 PROCEDURE — 93005 ELECTROCARDIOGRAM TRACING: CPT | Performed by: EMERGENCY MEDICINE

## 2021-12-07 PROCEDURE — 81003 URINALYSIS AUTO W/O SCOPE: CPT | Performed by: EMERGENCY MEDICINE

## 2021-12-07 PROCEDURE — 72131 CT LUMBAR SPINE W/O DYE: CPT

## 2021-12-07 RX ORDER — FUROSEMIDE 40 MG/1
40 TABLET ORAL 2 TIMES DAILY
COMMUNITY

## 2021-12-07 RX ORDER — DULAGLUTIDE 0.75 MG/.5ML
0.75 INJECTION, SOLUTION SUBCUTANEOUS WEEKLY
COMMUNITY

## 2021-12-07 RX ORDER — SODIUM CHLORIDE 0.9 % (FLUSH) 0.9 %
10 SYRINGE (ML) INJECTION AS NEEDED
Status: DISCONTINUED | OUTPATIENT
Start: 2021-12-07 | End: 2021-12-07 | Stop reason: HOSPADM

## 2021-12-07 RX ORDER — METOPROLOL SUCCINATE 25 MG/1
25 TABLET, EXTENDED RELEASE ORAL DAILY
COMMUNITY

## 2021-12-07 RX ORDER — LOSARTAN POTASSIUM 100 MG/1
100 TABLET ORAL DAILY
COMMUNITY

## 2021-12-07 RX ORDER — ESCITALOPRAM OXALATE 20 MG/1
20 TABLET ORAL DAILY
COMMUNITY

## 2021-12-07 RX ADMIN — IOPAMIDOL 100 ML: 755 INJECTION, SOLUTION INTRAVENOUS at 13:26

## 2021-12-07 NOTE — ED PROVIDER NOTES
"Time: 15:29 EST  Arrived by: EMS  Chief Complaint: fatigue  History provided by: pt  History is limited by: N/A    History of Present Illness:    Ricky W Sturgeon is a 66 y.o. male who presents to the emergency department today with complaints of headache since 1000. Pt state he began having headache, neck pain, and \"just felt off like he was going to collapse\".  This was all during some mild exertion.  Pt wife states pt hasn't been right since his heart attack two years ago. She claims he only has two good days a week. He denies chills, chest pain, abdominal pain, fever, or any other pertinent sx or concerns.  Patient denies any numbness, vision changes, dizziness.      History provided by:  Patient   used: No        Past Medical History:     Allergies   Allergen Reactions   • Sulfa Antibiotics Angioedema     Past Medical History:   Diagnosis Date   • Allergic rhinitis    • Arthritis    • Bulging lumbar disc    • Chest pain    • Chronic back pain    • Congestive heart failure (CHF) ([facility])    • DM (diabetes mellitus) ([facility])     type 2   • Dysphagia    • Elevated cholesterol    • Follow-up examination following surgery 10/09/2014   • Gastroesophageal reflux disease    • GERD (gastroesophageal reflux disease)    • Heart attack ([facility])     7/2019   • Hemorrhoids, external    • High blood pressure    • High cholesterol    • Lumbago     LOW BACK PAIN   • Mood disorder ([facility])    • Prostate disorder    • Shortness of breath    • Sleep apnea    • TIA (transient ischemic attack)     unable to do MRI to confirm     Past Surgical History:   Procedure Laterality Date   • COLONOSCOPY     • COLONOSCOPY N/A 7/28/2021    Procedure: COLONOSCOPY WITH POLYPECTOMY;  Surgeon: Jonas Barriga MD;  Location: [facility] ENDOSCOPY;  Service: General;  Laterality: N/A;  DIVERTICULOSIS, COLON POLYPS   • CORONARY ANGIOPLASTY WITH STENT PLACEMENT     • ENDOSCOPY     • ENDOSCOPY N/A 7/28/2021    Procedure: ESOPHAGOGASTRODUODENOSCOPY " WITH BIOPSIES;  Surgeon: Jonas Barriga MD;  Location: [facility] ENDOSCOPY;  Service: General;  Laterality: N/A;  GASTRITIS   • HEEL SPUR SURGERY Bilateral     HEEL SPURS REMOVED FROM BOTH HEELS   • INGUINAL HERNIA REPAIR Left    • PACEMAKER IMPLANTATION     • SHOULDER SURGERY Bilateral      Family History   Problem Relation Age of Onset   • Colon cancer Mother         70S   • Diabetes Mother    • Colon cancer Father         70S   • Stroke Other    • Cancer Other    • Esophageal cancer Brother    • Malig Hyperthermia Neg Hx        Home Medications:  Prior to Admission medications    Medication Sig Start Date End Date Taking? Authorizing Provider   ASPIRIN 81 PO Take 81 mg by mouth Daily.    Daniel Chatman MD   atorvastatin (LIPITOR) 40 MG tablet Take 20 mg by mouth Daily.    Daniel Chatman MD   Blood Glucose Monitoring Suppl (OneTouch Verio Flex System) w/Device kit See Admin Instructions. 6/21/21   Daniel Chatman MD   clopidogrel (Plavix) 75 MG tablet Plavix 75 mg oral tablet take 1 tablet (75 mg) by oral route once daily   Active    Daniel Chatman MD   escitalopram (LEXAPRO) 20 MG tablet Take 20 mg by mouth Daily.    Daniel Chatman MD   furosemide (LASIX) 40 MG tablet Take 40 mg by mouth Daily. 5/5/21   Daniel Chatman MD   gabapentin (NEURONTIN) 300 MG capsule Take 300 mg by mouth 4 (Four) Times a Day.    Daniel Chatman MD   glucose blood (OneTouch Verio) test strip check blood sugar once daily 6/21/21   Daniel Chatman MD   HYDROcodone-acetaminophen (NORCO) 5-325 MG per tablet Take 1 tablet by mouth 2 (Two) Times a Day.    Daniel Chatman MD   Lancets (OneTouch Delica Plus [id number]) misc check blood sugar once daily (100 day supply) 6/21/21   Daniel Chatman MD   losartan (Cozaar) 25 MG tablet Take 100 mg by mouth Every Night.    Daniel Chatman MD   metFORMIN ER (GLUCOPHAGE-XR) 500 MG 24 hr tablet Take 500 mg by mouth Daily. 6/19/21    "Daniel Chatman MD   metoprolol succinate XL (TOPROL-XL) 25 MG 24 hr tablet metoprolol succinate 25 mg oral tablet extended release 24 hr take 1 tablet (25 mg) by oral route once daily   Active    Daniel Chatman MD   OneTouch Verio test strip 1 each Daily. Check blood sugar 6/21/21   Daniel Chatman MD   pantoprazole (PROTONIX) 40 MG EC tablet Take 40 mg by mouth Daily. 5/5/21   Daniel Chatman MD        Social History:   PT  reports that he has never smoked. He has never used smokeless tobacco. He reports previous alcohol use. He reports that he does not use drugs.    Record Review:  I have reviewed the patient's records in [facility].     Review of Systems  Review of Systems   Constitutional: Negative for chills and fever.   HENT: Negative for congestion, rhinorrhea and sore throat.    Eyes: Negative for pain and visual disturbance.   Respiratory: Negative for apnea, cough, chest tightness and shortness of breath.    Cardiovascular: Negative for chest pain and palpitations.   Gastrointestinal: Negative for abdominal pain, diarrhea, nausea and vomiting.   Genitourinary: Negative for difficulty urinating and dysuria.   Musculoskeletal: Positive for neck pain. Negative for joint swelling and myalgias.   Skin: Negative for color change.   Neurological: Positive for headaches. Negative for seizures.   Psychiatric/Behavioral: Negative.    All other systems reviewed and are negative.       Physical Exam  /81   Pulse 63   Temp 97.9 °F (36.6 °C) (Oral)   Resp 18   Ht 172.7 cm (68\")   Wt 122 kg (269 lb 10 oz)   SpO2 95%   BMI 41.00 kg/m²     Physical Exam  Vitals and nursing note reviewed.   Constitutional:       General: He is not in acute distress.     Appearance: Normal appearance. He is not toxic-appearing.   HENT:      Head: Normocephalic and atraumatic.      Jaw: There is normal jaw occlusion.   Eyes:      General: Lids are normal.      Extraocular Movements: Extraocular movements " "intact.      Conjunctiva/sclera: Conjunctivae normal.      Pupils: Pupils are equal, round, and reactive to light.   Cardiovascular:      Rate and Rhythm: Normal rate and regular rhythm.      Pulses: Normal pulses.      Heart sounds: Normal heart sounds.   Pulmonary:      Effort: Pulmonary effort is normal. No respiratory distress.      Breath sounds: Normal breath sounds. No wheezing or rhonchi.   Abdominal:      General: Abdomen is flat.      Palpations: Abdomen is soft.      Tenderness: There is no abdominal tenderness. There is no guarding or rebound.   Musculoskeletal:         General: Normal range of motion.      Cervical back: Normal range of motion and neck supple.      Right lower leg: No edema.      Left lower leg: No edema.   Skin:     General: Skin is warm and dry.   Neurological:      General: No focal deficit present.      Mental Status: He is alert and oriented to person, place, and time. Mental status is at baseline.      Cranial Nerves: No cranial nerve deficit.      Sensory: No sensory deficit.      Motor: Weakness (Bilateral lower extremities, equal) present.      Coordination: Coordination normal.   Psychiatric:         Mood and Affect: Mood normal.         Behavior: Behavior normal.                  ED Course  /81   Pulse 63   Temp 97.9 °F (36.6 °C) (Oral)   Resp 18   Ht 172.7 cm (68\")   Wt 122 kg (269 lb 10 oz)   SpO2 95%   BMI 41.00 kg/m²   Results for orders placed or performed during the hospital encounter of 12/07/21   Comprehensive Metabolic Panel    Specimen: Blood   Result Value Ref Range    Glucose 111 (H) 65 - 99 mg/dL    BUN 21 8 - 23 mg/dL    Creatinine 1.18 0.76 - 1.27 mg/dL    Sodium 141 136 - 145 mmol/L    Potassium 3.9 3.5 - 5.2 mmol/L    Chloride 105 98 - 107 mmol/L    CO2 24.2 22.0 - 29.0 mmol/L    Calcium 9.2 8.6 - 10.5 mg/dL    Total Protein 7.1 6.0 - 8.5 g/dL    Albumin 4.50 3.50 - 5.20 g/dL    ALT (SGPT) 28 1 - 41 U/L    AST (SGOT) 19 1 - 40 U/L    Alkaline " Phosphatase 71 39 - 117 U/L    Total Bilirubin 0.6 0.0 - 1.2 mg/dL    eGFR Non African Amer 62 >60 mL/min/1.73    Globulin 2.6 gm/dL    A/G Ratio 1.7 g/dL    BUN/Creatinine Ratio 17.8 7.0 - 25.0    Anion Gap 11.8 5.0 - 15.0 mmol/L   Protime-INR    Specimen: Blood   Result Value Ref Range    Protime 10.4 9.4 - 12.0 Seconds    INR 0.99 (L) 2.00 - 3.00   aPTT    Specimen: Blood   Result Value Ref Range    PTT 23.0 22.2 - 34.2 seconds   Troponin    Specimen: Blood   Result Value Ref Range    Troponin T <0.010 0.000 - 0.030 ng/mL   Urinalysis With Microscopic If Indicated (No Culture) - Urine, Clean Catch    Specimen: Urine, Clean Catch   Result Value Ref Range    Color, UA Yellow Yellow, Straw    Appearance, UA Clear Clear    pH, UA 5.5 5.0 - 8.0    Specific Gravity, UA 1.016 1.005 - 1.030    Glucose, UA Negative Negative    Ketones, UA Negative Negative    Bilirubin, UA Negative Negative    Blood, UA Negative Negative    Protein, UA Negative Negative    Leuk Esterase, UA Negative Negative    Nitrite, UA Negative Negative    Urobilinogen, UA 1.0 E.U./dL 0.2 - 1.0 E.U./dL   CBC Auto Differential    Specimen: Blood   Result Value Ref Range    WBC 7.14 3.40 - 10.80 10*3/mm3    RBC 4.02 (L) 4.14 - 5.80 10*6/mm3    Hemoglobin 13.2 13.0 - 17.7 g/dL    Hematocrit 37.5 37.5 - 51.0 %    MCV 93.3 79.0 - 97.0 fL    MCH 32.8 26.6 - 33.0 pg    MCHC 35.2 31.5 - 35.7 g/dL    RDW 12.9 12.3 - 15.4 %    RDW-SD 43.8 37.0 - 54.0 fl    MPV 9.9 6.0 - 12.0 fL    Platelets 253 140 - 450 10*3/mm3    Neutrophil % 53.4 42.7 - 76.0 %    Lymphocyte % 32.4 19.6 - 45.3 %    Monocyte % 9.1 5.0 - 12.0 %    Eosinophil % 3.6 0.3 - 6.2 %    Basophil % 0.8 0.0 - 1.5 %    Immature Grans % 0.7 (H) 0.0 - 0.5 %    Neutrophils, Absolute 3.81 1.70 - 7.00 10*3/mm3    Lymphocytes, Absolute 2.31 0.70 - 3.10 10*3/mm3    Monocytes, Absolute 0.65 0.10 - 0.90 10*3/mm3    Eosinophils, Absolute 0.26 0.00 - 0.40 10*3/mm3    Basophils, Absolute 0.06 0.00 - 0.20 10*3/mm3     Immature Grans, Absolute 0.05 0.00 - 0.05 10*3/mm3    nRBC 0.0 0.0 - 0.2 /100 WBC   POC Glucose Once    Specimen: Blood   Result Value Ref Range    Glucose 111 (H) 70 - 99 mg/dL   POC Creatinine    Specimen: Blood   Result Value Ref Range    Creatinine 1.10 mg/dL    GFR MDRD       GFR MDRD Non  67 mL/min/1.73 sq.M   ECG 12 Lead   Result Value Ref Range    QT Interval 429 ms   ABO RH Specimen Verification    Specimen: Blood   Result Value Ref Range    ABO Type O     RH type Positive    Green Top (Gel)   Result Value Ref Range    Extra Tube Hold for add-ons.    Lavender Top   Result Value Ref Range    Extra Tube hold for add-on    Gold Top - SST   Result Value Ref Range    Extra Tube Hold for add-ons.    Light Blue Top   Result Value Ref Range    Extra Tube hold for add-on    Hold Creatinine Tube   Result Value Ref Range    Extra Tube Hold for add-ons.      Medications   sodium chloride 0.9 % flush 10 mL (has no administration in time range)   iopamidol (ISOVUE-370) 76 % injection 100 mL (100 mL Intravenous Given 12/7/21 1326)     CT Angiogram Neck    Result Date: 12/7/2021  Narrative: PROCEDURE: CT ANGIOGRAM NECK  COMPARISON: [facility], CT, CTA HEAD AND NECK WITH CONTRAST  STROKE PROTOCOL, 7/12/2019, 12:11.  [facility], CT, CT ANGIOGRAM HEAD, 12/07/2021, 13:16.  [facility], CT, CTA HEAD WITH CONTRAST CTA NECK WITH CONTRAST, 5/07/2021, 17:37.  INDICATIONS: AMS CHANGES STROKE  PROTOCOL:   Standard imaging protocol performed    RADIATION:   DLP: 1193.4mGy*cm   Automated exposure control was utilized to minimize radiation dose. CONTRAST: 100cc Isovue 370 I.V.  TECHNIQUE: After obtaining the patient's consent, CT images of the neck were obtained without and with non-ionic intravenous contrast material. Multi-planar reformatted/3-D images were created to optimize visualization of vascular anatomy. Unless otherwise stated in this report, all vascular  stenoses involving the internal carotid arteries reported for this examination are derived by dividing the lesion diameter by the diameter of the normal internal carotid artery more distally.  FINDINGS:  LEFT INTERNAL CAROTID: There is atherosclerotic plaque at the carotid bifurcation.  Stable mild ectasia of the left internal carotid artery.  No hemodynamically significant stenosis or dissection.  EXTERNAL CAROTID: No hemodynamically significant stenosis or dissection.  COMMON CAROTID: No hemodynamically significant stenosis or dissection.  VERTEBRAL: No hemodynamically significant stenosis or dissection.   RIGHT INTERNAL CAROTID: There is atherosclerotic plaque at the carotid bifurcation.  Stable ectasia of the right internal carotid artery.  No hemodynamically significant stenosis or dissection.  EXTERNAL CAROTID: No hemodynamically significant stenosis or dissection.  COMMON CAROTID: No hemodynamically significant stenosis or dissection.  VERTEBRAL: No hemodynamically significant stenosis or dissection.   OTHER: The visualized soft tissues of the neck are also unremarkable.   CONCLUSION: Stable exam.  No evidence of significant internal carotid artery stenosis.     FATUMA SCHMIDT MD       Electronically Signed and Approved By: FATUMA SCHMIDT MD on 12/07/2021 at 14:32             CT Lumbar Spine Without Contrast    Result Date: 12/7/2021  Narrative: PROCEDURE: CT LUMBAR SPINE WO CONTRAST  COMPARISON: [facility], CT, CT LUMBAR SPINE WO CONTRAST, 6/23/2021, 16:43.  INDICATIONS: low back pain, bilateral lower extremity weakness  PROTOCOL:   Standard imaging protocol performed    RADIATION:   DLP: 1578.2mGy*cm   Automated exposure control was utilized to minimize radiation dose.  TECHNIQUE: After obtaining the patient's consent, multi-planar CT images were created without intravenous contrast material.   FINDINGS:  There is levoconvex scoliosis of the mid lumbar spine.  There is normal height and  alignment of the lumbar vertebral bodies.  There is no evidence of acute fracture.  There is disc space narrowing with vacuum disc at the L3/4, L4/5, L5/S1 levels.  There is a low-density mass arising from the posterior cortex of the mid left kidney most likely a cyst.  Other visualized paraspinal soft tissues are unremarkable.  There is ankylosis of the right sacroiliac joint.  There is gas within the left sacroiliac joint.  Findings are unchanged from prior exam.  Axial images demonstrate:  T12/L1:  No significant disc bulge, spinal canal stenosis, or neural foraminal narrowing.  Facet joints are within normal limits.  L1/2:  Mild circumferential disc bulge and mild degenerative facet change.  There is no spinal canal stenosis or neural foraminal narrowing.  L2/3:  Mild circumferential disc bulge and mild degenerative facet change.  There is no spinal canal stenosis.  No significant neural foraminal narrowing.  L3/4:  Persistent vacuum disc and moderate circumferential disc bulge.  There are mild degenerative facet changes bilaterally.  Disc bulge is asymmetric to the right and results in mild spinal canal stenosis.  There is moderate right neural foraminal narrowing.  No left neural foraminal narrowing.  L4/5:  Moderate circumferential disc bulge with a vacuum disc.  There are mild degenerative facet changes bilaterally.  There is borderline spinal canal stenosis.  There is mild bilateral neural foraminal narrowing left greater than right.  L5/S1:  Mild posterior disc bulge asymmetric to the left.  There is a large left lateral disc osteophyte complex.  There are mild degenerative facet changes bilaterally left greater than right.  There is no spinal canal stenosis.  There is moderate left neural foraminal narrowing.  No right neural foraminal narrowing.    CONCLUSION:  1. Stable multilevel degenerative disc disease and degenerative facet change resulting in multilevel canal stenosis and neural foraminal  narrowing as detailed above.     JULIANE POPE MD       Electronically Signed and Approved By: JULIANE POPE MD on 12/07/2021 at 12:47             XR Chest 1 View    Result Date: 12/7/2021  Narrative: PROCEDURE: XR CHEST 1 VW  COMPARISON: [facility], CR, CHEST AP/PA 1 VIEW, 5/07/2021, 14:50.  INDICATIONS: Acute Stroke Protocol (Onset < 12 hrs)  FINDINGS:  Pacemaker is noted overlying the left hemithorax with intact wires.  The heart is enlarged.  No consolidative process or congestive changes noted.  There is left basilar atelectasis versus scar.  The mediastinum is unrevealing.  Bones reveal degenerative changes.  CONCLUSION:  1. Chronic changes are noted about the chest the left basilar atelectasis versus scar.  No pneumonia or congestive changes noted       OJAN MONTANEZ MD       Electronically Signed and Approved By: JOAN MONTANEZ MD on 12/07/2021 at 13:24             CT Angiogram Head    Result Date: 12/7/2021  Narrative: PROCEDURE: CT ANGIOGRAM HEAD  COMPARISON: [facility], CT, CT HEAD WO CONTRAST STROKE PROTOCOL, 12/07/2021, 12:07.  [facility], CT, CTA HEAD AND NECK WITH CONTRAST  STROKE PROTOCOL, 7/12/2019, 12:11.  [facility], CT, CT HEAD WO CONTRAST, 11/06/2021, 16:57.  [facility], CT, CTA HEAD WITH CONTRAST CTA NECK WITH CONTRAST, 5/07/2021, 17:37.  INDICATIONS: AMS CHANGES STROKE  PROTOCOL:   Standard imaging protocol performed    RADIATION:   DLP: 1193.2mGy*cm   MA and/or KV was adjusted to minimize radiation dose.    CONTRAST: 100cc Isovue 370 I.V.  TECHNIQUE: After obtaining the patient's consent, CT images of the head were obtained without and with non-ionic contrast, and multi-planar/3-D imaging were created and interpreted to optimize visualization of vascular anatomy.   FINDINGS:   The intracranial portion of the internal carotid arteries, major MCA and BERTRAND branches are patent.  The distal vertebral arteries, basilar  artery and posterior cerebral arteries are patent.  Stable mild ectasia of the supraclinoid right internal carotid artery.  No evidence of large vessel occlusion, high-grade stenosis, dissection or aneurysm.  There is atherosclerotic calcification in the cavernous ICAs.  CONCLUSION: Stable exam.  No evidence of large vessel occlusion, high-grade stenosis, dissection or aneurysm.     FATUAM SCHMIDT MD       Electronically Signed and Approved By: FATUMA SCHMIDT MD on 12/07/2021 at 14:38             CT Head Without Contrast Stroke Protocol    Result Date: 12/7/2021  Narrative: PROCEDURE: CT HEAD WO CONTRAST STROKE PROTOCOL  COMPARISON:  [facility], CT, CTA HEAD AND NECK WITH CONTRAST  STROKE PROTOCOL, 7/12/2019, 12:11. INDICATIONS: Stroke, follow up. left side weakness, headache  PROTOCOL:   Standard imaging protocol performed    RADIATION:   DLP: 1081.2mGy*cm   MA and/or KV was adjusted to minimize radiation dose.     TECHNIQUE: After obtaining the patient's consent, CT images were obtained without non-ionic intravenous contrast material.  FINDINGS:  There is no hemorrhage, edema, or mass-effect.  The CSF spaces are normal.  There are no abnormal extra-axial fluid collections.  No skull abnormality demonstrated.  Visualized paranasal sinuses and middle ear cavities are clear  CONCLUSION: 1. No intracranial hemorrhage.  2. No CT changes of acute brain ischemia at this time     CLARENCE LO MD       Electronically Signed and Approved By: CLARENCE LO MD on 12/07/2021 at 12:12               Procedures/EKGs:  Procedures    Medical Decision Making:                         MDM  Number of Diagnoses or Management Options     Amount and/or Complexity of Data Reviewed  Clinical lab tests: reviewed  Tests in the radiology section of CPT®: reviewed  Tests in the medicine section of CPT®: reviewed  Independent visualization of images, tracings, or specimens: yes    Risk of Complications, Morbidity, and/or  Mortality  Presenting problems: moderate  Management options: low    Patient Progress  Patient progress: stable       Final diagnoses:   Acute bilateral low back pain without sciatica   Chronic neck pain   Generalized weakness          Disposition:  ED Disposition     ED Disposition Condition Comment    Discharge Stable           Documentation assistance provided by Mendoza Chaney MD acting as scribe for Mendoza Chaney MD. Information recorded by the scribe was done at my direction and has been verified and validated by me.        Ashley Churchill  12/07/21 1228       Mendoza Chaney MD  12/07/21 [id number]

## 2021-12-07 NOTE — ED NOTES
Pt [facility] r/t headache, lower back pain, generalized weakness to BLE, BUE since 1000am. Pt denies dizziness, SOA, CP. Pt denies fevers, chills. Reports mild nausea. Denies vomiting.      Lizzy Cota, RN  12/07/21 5705

## 2022-01-15 ENCOUNTER — HOSPITAL ENCOUNTER (EMERGENCY)
Facility: HOSPITAL | Age: 67
Discharge: HOME | End: 2022-01-15
Attending: EMERGENCY MEDICINE | Admitting: EMERGENCY MEDICINE

## 2022-01-15 ENCOUNTER — APPOINTMENT (OUTPATIENT)
Dept: GENERAL RADIOLOGY | Facility: HOSPITAL | Age: 67
End: 2022-01-15

## 2022-01-15 ENCOUNTER — APPOINTMENT (OUTPATIENT)
Dept: CT IMAGING | Facility: HOSPITAL | Age: 67
End: 2022-01-15

## 2022-01-15 VITALS
BODY MASS INDEX: 39.79 KG/M2 | DIASTOLIC BLOOD PRESSURE: 73 MMHG | SYSTOLIC BLOOD PRESSURE: 117 MMHG | TEMPERATURE: 99.3 F | RESPIRATION RATE: 18 BRPM | HEIGHT: 68 IN | OXYGEN SATURATION: 94 % | WEIGHT: 262.57 LBS | HEART RATE: 88 BPM

## 2022-01-15 DIAGNOSIS — U07.1 COVID-19 VIRUS DETECTED: Primary | ICD-10-CM

## 2022-01-15 DIAGNOSIS — J18.9 MULTIFOCAL PNEUMONIA: ICD-10-CM

## 2022-01-15 LAB
ALBUMIN SERPL-MCNC: 4.3 G/DL (ref 3.5–5.2)
ALBUMIN/GLOB SERPL: 1.5 G/DL
ALP SERPL-CCNC: 73 U/L (ref 39–117)
ALT SERPL W P-5'-P-CCNC: 36 U/L (ref 1–41)
ANION GAP SERPL CALCULATED.3IONS-SCNC: 14.3 MMOL/L (ref 5–15)
AST SERPL-CCNC: 23 U/L (ref 1–40)
BASOPHILS # BLD AUTO: 0.01 10*3/MM3 (ref 0–0.2)
BASOPHILS NFR BLD AUTO: 0.2 % (ref 0–1.5)
BILIRUB SERPL-MCNC: 0.4 MG/DL (ref 0–1.2)
BUN SERPL-MCNC: 20 MG/DL (ref 8–23)
BUN/CREAT SERPL: 14.7 (ref 7–25)
CALCIUM SPEC-SCNC: 9 MG/DL (ref 8.6–10.5)
CHLORIDE SERPL-SCNC: 104 MMOL/L (ref 98–107)
CO2 SERPL-SCNC: 20.7 MMOL/L (ref 22–29)
CREAT SERPL-MCNC: 1.36 MG/DL (ref 0.76–1.27)
D-LACTATE SERPL-SCNC: 0.7 MMOL/L (ref 0.5–2)
DEPRECATED RDW RBC AUTO: 44.9 FL (ref 37–54)
EOSINOPHIL # BLD AUTO: 0.01 10*3/MM3 (ref 0–0.4)
EOSINOPHIL NFR BLD AUTO: 0.2 % (ref 0.3–6.2)
ERYTHROCYTE [DISTWIDTH] IN BLOOD BY AUTOMATED COUNT: 13 % (ref 12.3–15.4)
FLUAV AG NPH QL: NEGATIVE
FLUBV AG NPH QL IA: NEGATIVE
GFR SERPL CREATININE-BSD FRML MDRD: 53 ML/MIN/1.73
GLOBULIN UR ELPH-MCNC: 2.9 GM/DL
GLUCOSE SERPL-MCNC: 128 MG/DL (ref 65–99)
HCT VFR BLD AUTO: 38.9 % (ref 37.5–51)
HGB BLD-MCNC: 13.1 G/DL (ref 13–17.7)
HOLD SPECIMEN: NORMAL
HOLD SPECIMEN: NORMAL
IMM GRANULOCYTES # BLD AUTO: 0.03 10*3/MM3 (ref 0–0.05)
IMM GRANULOCYTES NFR BLD AUTO: 0.5 % (ref 0–0.5)
LYMPHOCYTES # BLD AUTO: 1.45 10*3/MM3 (ref 0.7–3.1)
LYMPHOCYTES NFR BLD AUTO: 25.6 % (ref 19.6–45.3)
MCH RBC QN AUTO: 32.1 PG (ref 26.6–33)
MCHC RBC AUTO-ENTMCNC: 33.7 G/DL (ref 31.5–35.7)
MCV RBC AUTO: 95.3 FL (ref 79–97)
MONOCYTES # BLD AUTO: 0.53 10*3/MM3 (ref 0.1–0.9)
MONOCYTES NFR BLD AUTO: 9.4 % (ref 5–12)
NEUTROPHILS NFR BLD AUTO: 3.63 10*3/MM3 (ref 1.7–7)
NEUTROPHILS NFR BLD AUTO: 64.1 % (ref 42.7–76)
NRBC BLD AUTO-RTO: 0 /100 WBC (ref 0–0.2)
NT-PROBNP SERPL-MCNC: 62.6 PG/ML (ref 0–900)
PLATELET # BLD AUTO: 228 10*3/MM3 (ref 140–450)
PMV BLD AUTO: 9.5 FL (ref 6–12)
POTASSIUM SERPL-SCNC: 4.2 MMOL/L (ref 3.5–5.2)
PROT SERPL-MCNC: 7.2 G/DL (ref 6–8.5)
RBC # BLD AUTO: 4.08 10*6/MM3 (ref 4.14–5.8)
SODIUM SERPL-SCNC: 139 MMOL/L (ref 136–145)
TROPONIN T SERPL-MCNC: <0.01 NG/ML (ref 0–0.03)
WBC NRBC COR # BLD: 5.66 10*3/MM3 (ref 3.4–10.8)
WHOLE BLOOD HOLD SPECIMEN: NORMAL
WHOLE BLOOD HOLD SPECIMEN: NORMAL

## 2022-01-15 PROCEDURE — 84484 ASSAY OF TROPONIN QUANT: CPT

## 2022-01-15 PROCEDURE — 87804 INFLUENZA ASSAY W/OPTIC: CPT

## 2022-01-15 PROCEDURE — 93010 ELECTROCARDIOGRAM REPORT: CPT | Performed by: INTERNAL MEDICINE

## 2022-01-15 PROCEDURE — 87040 BLOOD CULTURE FOR BACTERIA: CPT | Performed by: NURSE PRACTITIONER

## 2022-01-15 PROCEDURE — 93005 ELECTROCARDIOGRAM TRACING: CPT

## 2022-01-15 PROCEDURE — 71275 CT ANGIOGRAPHY CHEST: CPT

## 2022-01-15 PROCEDURE — U0004 COV-19 TEST NON-CDC HGH THRU: HCPCS

## 2022-01-15 PROCEDURE — 25010000002 DEXAMETHASONE PER 1 MG: Performed by: NURSE PRACTITIONER

## 2022-01-15 PROCEDURE — 71045 X-RAY EXAM CHEST 1 VIEW: CPT

## 2022-01-15 PROCEDURE — 36415 COLL VENOUS BLD VENIPUNCTURE: CPT

## 2022-01-15 PROCEDURE — 25010000002 CEFTRIAXONE PER 250 MG: Performed by: NURSE PRACTITIONER

## 2022-01-15 PROCEDURE — 25010000002 ONDANSETRON PER 1 MG: Performed by: NURSE PRACTITIONER

## 2022-01-15 PROCEDURE — 96361 HYDRATE IV INFUSION ADD-ON: CPT

## 2022-01-15 PROCEDURE — 83880 ASSAY OF NATRIURETIC PEPTIDE: CPT

## 2022-01-15 PROCEDURE — 96375 TX/PRO/DX INJ NEW DRUG ADDON: CPT

## 2022-01-15 PROCEDURE — 99284 EMERGENCY DEPT VISIT MOD MDM: CPT

## 2022-01-15 PROCEDURE — 0 IOPAMIDOL PER 1 ML: Performed by: EMERGENCY MEDICINE

## 2022-01-15 PROCEDURE — 80053 COMPREHEN METABOLIC PANEL: CPT

## 2022-01-15 PROCEDURE — 93005 ELECTROCARDIOGRAM TRACING: CPT | Performed by: EMERGENCY MEDICINE

## 2022-01-15 PROCEDURE — 85025 COMPLETE CBC W/AUTO DIFF WBC: CPT

## 2022-01-15 PROCEDURE — 96365 THER/PROPH/DIAG IV INF INIT: CPT

## 2022-01-15 PROCEDURE — 83605 ASSAY OF LACTIC ACID: CPT | Performed by: NURSE PRACTITIONER

## 2022-01-15 RX ORDER — CEFTRIAXONE SODIUM 1 G/50ML
1 INJECTION, SOLUTION INTRAVENOUS ONCE
Status: COMPLETED | OUTPATIENT
Start: 2022-01-15 | End: 2022-01-15

## 2022-01-15 RX ORDER — ACETAMINOPHEN 325 MG/1
650 TABLET ORAL ONCE
Status: COMPLETED | OUTPATIENT
Start: 2022-01-15 | End: 2022-01-15

## 2022-01-15 RX ORDER — ONDANSETRON 2 MG/ML
4 INJECTION INTRAMUSCULAR; INTRAVENOUS ONCE
Status: COMPLETED | OUTPATIENT
Start: 2022-01-15 | End: 2022-01-15

## 2022-01-15 RX ORDER — DOXYCYCLINE 100 MG/1
100 CAPSULE ORAL 2 TIMES DAILY
Qty: 20 CAPSULE | Refills: 0 | Status: SHIPPED | OUTPATIENT
Start: 2022-01-15 | End: 2022-01-25

## 2022-01-15 RX ORDER — DEXAMETHASONE 4 MG/1
4 TABLET ORAL 2 TIMES DAILY WITH MEALS
Qty: 14 TABLET | Refills: 0 | Status: SHIPPED | OUTPATIENT
Start: 2022-01-15 | End: 2022-01-22

## 2022-01-15 RX ORDER — DEXAMETHASONE SODIUM PHOSPHATE 10 MG/ML
10 INJECTION INTRAMUSCULAR; INTRAVENOUS ONCE
Status: COMPLETED | OUTPATIENT
Start: 2022-01-15 | End: 2022-01-15

## 2022-01-15 RX ORDER — ONDANSETRON 4 MG/1
4 TABLET, ORALLY DISINTEGRATING ORAL 4 TIMES DAILY PRN
Qty: 15 TABLET | Refills: 0 | Status: SHIPPED | OUTPATIENT
Start: 2022-01-15

## 2022-01-15 RX ORDER — ALBUTEROL SULFATE 90 UG/1
2 AEROSOL, METERED RESPIRATORY (INHALATION) EVERY 6 HOURS PRN
Qty: 18 G | Refills: 0 | Status: SHIPPED | OUTPATIENT
Start: 2022-01-15

## 2022-01-15 RX ORDER — SODIUM CHLORIDE 0.9 % (FLUSH) 0.9 %
10 SYRINGE (ML) INJECTION AS NEEDED
Status: DISCONTINUED | OUTPATIENT
Start: 2022-01-15 | End: 2022-01-16 | Stop reason: HOSPADM

## 2022-01-15 RX ADMIN — IOPAMIDOL 75 ML: 755 INJECTION, SOLUTION INTRAVENOUS at 21:03

## 2022-01-15 RX ADMIN — CEFTRIAXONE SODIUM 1 G: 1 INJECTION, SOLUTION INTRAVENOUS at 21:44

## 2022-01-15 RX ADMIN — DEXAMETHASONE SODIUM PHOSPHATE 10 MG: 10 INJECTION INTRAMUSCULAR; INTRAVENOUS at 20:40

## 2022-01-15 RX ADMIN — ONDANSETRON 4 MG: 2 INJECTION INTRAMUSCULAR; INTRAVENOUS at 20:40

## 2022-01-15 RX ADMIN — ACETAMINOPHEN 650 MG: 325 TABLET ORAL at 20:45

## 2022-01-15 RX ADMIN — SODIUM CHLORIDE 1000 ML: 9 INJECTION, SOLUTION INTRAVENOUS at 20:40

## 2022-01-16 LAB — SARS-COV-2 RNA PNL SPEC NAA+PROBE: DETECTED

## 2022-01-16 NOTE — ED PROVIDER NOTES
Time: 22:06 EST  Arrived by: Vehicle  Chief Complaint: COVID symptoms  History provided by: Patient  History is limited by: N/A    History of Present Illness:  Patient is a 66 y.o. year old male that presents to the emergency department with progressively worsening COVID symptoms all week      History provided by:  Patient  Headache  Pain location:  Generalized  Quality:  Dull  Radiates to:  Does not radiate  Severity currently:  3/10  Severity at highest:  3/10  Onset quality:  Gradual  Duration:  6 days  Timing:  Constant  Progression:  Waxing and waning  Chronicity:  New  Similar to prior headaches: yes    Context: activity, loud noise and straining    Relieved by:  Nothing  Worsened by:  Nothing  Ineffective treatments:  None tried  Associated symptoms: back pain ( Chronic), congestion, cough, diarrhea, fatigue, fever ( 100.4), myalgias, nausea, sore throat and URI    Associated symptoms: no abdominal pain, no blurred vision, no dizziness, no ear pain, no eye pain, no facial pain, no focal weakness, no hearing loss, no loss of balance, no near-syncope, no neck pain, no neck stiffness, no numbness, no paresthesias, no photophobia, no seizures, no sinus pressure, no swollen glands, no syncope, no tingling, no visual change, no vomiting and no weakness    Cough:     Cough characteristics:  Productive    Sputum characteristics:  Green    Severity:  Moderate    Onset quality:  Gradual    Timing:  Constant    Progression:  Worsening  Fever:     Duration:  6 days    Timing:  Intermittent    Max temp PTA:  100.4    Temp source:  Oral    Progression:  Waxing and waning  Myalgias:     Location:  Generalized    Quality:  Aching    Severity:  Moderate    Onset quality:  Gradual    Duration:  6 days    Timing:  Constant    Progression:  Unchanged  Fever  Associated symptoms: chills, congestion, cough, diarrhea, headaches, myalgias, nausea and sore throat    Associated symptoms: no chest pain, no ear pain, no rhinorrhea and  no vomiting    Abdominal Pain  Associated symptoms: chills, cough, diarrhea, fatigue, fever ( 100.4), nausea, shortness of breath and sore throat    Associated symptoms: no chest pain and no vomiting    Diarrhea  The primary symptoms include fever ( 100.4), fatigue, nausea, diarrhea and myalgias. Primary symptoms do not include abdominal pain or vomiting.   The myalgias are not associated with weakness.   The illness is also significant for chills and back pain ( Chronic).     Similar Symptoms Previously: Patient was seen on Tuesday and was negative for COVID but continued to feel bad so was seen again and had a positive COVID test come back on Friday  Recently seen: PCP this week for COVID testing.  No meds given      Patient Care Team  Primary Care Provider: DUANE Suresh    Past Medical History:     Allergies   Allergen Reactions   • Sulfa Antibiotics Angioedema     Past Medical History:   Diagnosis Date   • Allergic rhinitis    • Arthritis    • Bulging lumbar disc    • Chest pain    • Chronic back pain    • Congestive heart failure (CHF) ([facility])    • DM (diabetes mellitus) ([facility])     type 2   • Dysphagia    • Elevated cholesterol    • Follow-up examination following surgery 10/09/2014   • Gastroesophageal reflux disease    • GERD (gastroesophageal reflux disease)    • Heart attack (HCC)     7/2019   • Hemorrhoids, external    • High blood pressure    • High cholesterol    • Lumbago     LOW BACK PAIN   • Mood disorder (HCC)    • Prostate disorder    • Shortness of breath    • Sleep apnea    • TIA (transient ischemic attack)     unable to do MRI to confirm     Past Surgical History:   Procedure Laterality Date   • COLONOSCOPY     • COLONOSCOPY N/A 7/28/2021    Procedure: COLONOSCOPY WITH POLYPECTOMY;  Surgeon: Jonas Barriga MD;  Location: [facility] ENDOSCOPY;  Service: General;  Laterality: N/A;  DIVERTICULOSIS, COLON POLYPS   • CORONARY ANGIOPLASTY WITH STENT PLACEMENT     • ENDOSCOPY     • ENDOSCOPY N/A 7/28/2021     Procedure: ESOPHAGOGASTRODUODENOSCOPY WITH BIOPSIES;  Surgeon: Jonas Barriga MD;  Location: [facility] [place]OSCOPY;  Service: General;  Laterality: N/A;  GASTRITIS   • HEEL SPUR SURGERY Bilateral     HEEL SPURS REMOVED FROM BOTH HEELS   • INGUINAL HERNIA REPAIR Left    • PACEMAKER IMPLANTATION     • SHOULDER SURGERY Bilateral      Family History   Problem Relation Age of Onset   • Colon cancer Mother         70S   • Diabetes Mother    • Colon cancer Father         70S   • Stroke Other    • Cancer Other    • Esophageal cancer Brother    • Malig Hyperthermia Neg Hx        Home Medications:  Prior to Admission medications    Medication Sig Start Date End Date Taking? Authorizing Provider   ASPIRIN 81 PO Take 81 mg by mouth Daily.    Daniel Chatman MD   atorvastatin (LIPITOR) 40 MG tablet Take 40 mg by mouth Daily.    Daniel Chatman MD   clopidogrel (Plavix) 75 MG tablet Take 75 mg by mouth.    Daniel Chatman MD   Dulaglutide (Trulicity) 0.75 MG/0.5ML solution pen-injector Inject 0.75 mg under the skin into the appropriate area as directed 1 (One) Time Per Week. Sunday    Daniel Chatman MD   escitalopram (LEXAPRO) 20 MG tablet Take 20 mg by mouth Daily.    Daniel Chatman MD   furosemide (LASIX) 40 MG tablet Take 40 mg by mouth 2 (Two) Times a Day.    Daniel Chatman MD   HYDROcodone-acetaminophen (NORCO) 5-325 MG per tablet Take 1 tablet by mouth 2 (Two) Times a Day.    Daniel Chatman MD   linaclotide (Linzess) 72 MCG capsule capsule Take 72 mcg by mouth Every Morning Before Breakfast.    Daniel Chatman MD   losartan (COZAAR) 100 MG tablet Take 100 mg by mouth Daily.    Daniel Chatman MD   metFORMIN ER (GLUCOPHAGE-XR) 500 MG 24 hr tablet Take 500 mg by mouth Daily. 6/19/21   Daniel Chatman MD   metoprolol succinate XL (TOPROL-XL) 25 MG 24 hr tablet Take 25 mg by mouth Daily.    Daniel Chatman MD   pantoprazole (PROTONIX) 40 MG EC tablet Take 40 mg  "by mouth Daily. 5/5/21   Provider, MD Daniel        Social History:   PT  reports that he has never smoked. He has never used smokeless tobacco. He reports previous alcohol use. He reports that he does not use drugs.    Record Review:  I have reviewed the patient's records in [facility].     Review of Systems  Review of Systems   Constitutional: Positive for chills, fatigue and fever ( 100.4).   HENT: Positive for congestion and sore throat. Negative for ear pain, hearing loss, rhinorrhea, sinus pressure, sinus pain and sneezing.    Eyes: Negative for blurred vision, photophobia and pain.   Respiratory: Positive for cough and shortness of breath.    Cardiovascular: Negative for chest pain, palpitations, syncope and near-syncope.   Gastrointestinal: Positive for diarrhea and nausea. Negative for abdominal pain and vomiting.   Genitourinary: Negative for flank pain.   Musculoskeletal: Positive for back pain ( Chronic) and myalgias. Negative for neck pain and neck stiffness.   Skin: Negative.    Neurological: Positive for headaches. Negative for dizziness, focal weakness, seizures, weakness, numbness, paresthesias and loss of balance.   Hematological: Negative.    Psychiatric/Behavioral: Negative.         Physical Exam  /73   Pulse 88   Temp 99.3 °F (37.4 °C) (Oral)   Resp 18   Ht 172.7 cm (68\")   Wt 119 kg (262 lb 9.1 oz)   SpO2 94%   BMI 39.92 kg/m²     Physical Exam  Vitals and nursing note reviewed.   Constitutional:       General: He is not in acute distress.     Appearance: Normal appearance. He is well-developed. He is not toxic-appearing.   HENT:      Head: Normocephalic and atraumatic.      Mouth/Throat:      Mouth: Mucous membranes are moist.   Eyes:      General: No scleral icterus.     Extraocular Movements: Extraocular movements intact.      Pupils: Pupils are equal, round, and reactive to light.   Cardiovascular:      Rate and Rhythm: Regular rhythm. Tachycardia present.      Pulses: Normal " "pulses.      Heart sounds: Normal heart sounds.   Pulmonary:      Effort: Pulmonary effort is normal. No respiratory distress.      Comments: Decreased bilateral breath signs in bases  Abdominal:      General: Abdomen is protuberant. Bowel sounds are normal.      Palpations: Abdomen is soft.      Tenderness: There is no abdominal tenderness. There is no right CVA tenderness or left CVA tenderness.   Musculoskeletal:         General: Normal range of motion.      Cervical back: Normal range of motion and neck supple.   Skin:     General: Skin is warm and dry.   Neurological:      Mental Status: He is alert and oriented to person, place, and time. Mental status is at baseline.   Psychiatric:         Mood and Affect: Mood normal.         Behavior: Behavior normal.          ED Course  /73   Pulse 88   Temp 99.3 °F (37.4 °C) (Oral)   Resp 18   Ht 172.7 cm (68\")   Wt 119 kg (262 lb 9.1 oz)   SpO2 94%   BMI 39.92 kg/m²   Results for orders placed or performed during the hospital encounter of 01/15/22   Influenza Antigen, Rapid - Swab, Nasopharynx    Specimen: Nasopharynx; Swab   Result Value Ref Range    Influenza A Ag, EIA Negative Negative    Influenza B Ag, EIA Negative Negative   Comprehensive Metabolic Panel    Specimen: Blood   Result Value Ref Range    Glucose 128 (H) 65 - 99 mg/dL    BUN 20 8 - 23 mg/dL    Creatinine 1.36 (H) 0.76 - 1.27 mg/dL    Sodium 139 136 - 145 mmol/L    Potassium 4.2 3.5 - 5.2 mmol/L    Chloride 104 98 - 107 mmol/L    CO2 20.7 (L) 22.0 - 29.0 mmol/L    Calcium 9.0 8.6 - 10.5 mg/dL    Total Protein 7.2 6.0 - 8.5 g/dL    Albumin 4.30 3.50 - 5.20 g/dL    ALT (SGPT) 36 1 - 41 U/L    AST (SGOT) 23 1 - 40 U/L    Alkaline Phosphatase 73 39 - 117 U/L    Total Bilirubin 0.4 0.0 - 1.2 mg/dL    eGFR Non African Amer 53 (L) >60 mL/min/1.73    Globulin 2.9 gm/dL    A/G Ratio 1.5 g/dL    BUN/Creatinine Ratio 14.7 7.0 - 25.0    Anion Gap 14.3 5.0 - 15.0 mmol/L   BNP    Specimen: Blood   Result " Value Ref Range    proBNP 62.6 0.0 - 900.0 pg/mL   Troponin    Specimen: Blood   Result Value Ref Range    Troponin T <0.010 0.000 - 0.030 ng/mL   CBC Auto Differential    Specimen: Blood   Result Value Ref Range    WBC 5.66 3.40 - 10.80 10*3/mm3    RBC 4.08 (L) 4.14 - 5.80 10*6/mm3    Hemoglobin 13.1 13.0 - 17.7 g/dL    Hematocrit 38.9 37.5 - 51.0 %    MCV 95.3 79.0 - 97.0 fL    MCH 32.1 26.6 - 33.0 pg    MCHC 33.7 31.5 - 35.7 g/dL    RDW 13.0 12.3 - 15.4 %    RDW-SD 44.9 37.0 - 54.0 fl    MPV 9.5 6.0 - 12.0 fL    Platelets 228 140 - 450 10*3/mm3    Neutrophil % 64.1 42.7 - 76.0 %    Lymphocyte % 25.6 19.6 - 45.3 %    Monocyte % 9.4 5.0 - 12.0 %    Eosinophil % 0.2 (L) 0.3 - 6.2 %    Basophil % 0.2 0.0 - 1.5 %    Immature Grans % 0.5 0.0 - 0.5 %    Neutrophils, Absolute 3.63 1.70 - 7.00 10*3/mm3    Lymphocytes, Absolute 1.45 0.70 - 3.10 10*3/mm3    Monocytes, Absolute 0.53 0.10 - 0.90 10*3/mm3    Eosinophils, Absolute 0.01 0.00 - 0.40 10*3/mm3    Basophils, Absolute 0.01 0.00 - 0.20 10*3/mm3    Immature Grans, Absolute 0.03 0.00 - 0.05 10*3/mm3    nRBC 0.0 0.0 - 0.2 /100 WBC   ECG 12 Lead   Result Value Ref Range    QT Interval 329 ms   Green Top (Gel)   Result Value Ref Range    Extra Tube Hold for add-ons.    Lavender Top   Result Value Ref Range    Extra Tube hold for add-on    Gold Top - SST   Result Value Ref Range    Extra Tube Hold for add-ons.    Light Blue Top   Result Value Ref Range    Extra Tube hold for add-on      Medications   sodium chloride 0.9 % flush 10 mL (has no administration in time range)   cefTRIAXone (ROCEPHIN) IVPB 1 g (1 g Intravenous New Bag 1/15/22 2144)   ondansetron (ZOFRAN) injection 4 mg (4 mg Intravenous Given 1/15/22 2040)   dexamethasone (DECADRON) injection 10 mg (10 mg Intravenous Given 1/15/22 2040)   sodium chloride 0.9 % bolus 1,000 mL (0 mL Intravenous Stopped 1/15/22 2149)   acetaminophen (TYLENOL) tablet 650 mg (650 mg Oral Given 1/15/22 2045)   iopamidol (ISOVUE-370) 76  % injection 100 mL (75 mL Intravenous Given 1/15/22 2103)     XR Chest 1 View    Result Date: 1/15/2022  Narrative: PROCEDURE: XR CHEST 1 VW  COMPARISON: [facility], , XR CHEST 1 VW, 12/07/2021, 12:30.  INDICATIONS: SOA  FINDINGS:  No new consolidations or pleural effusions are observed. The cardiac silhouette and mediastinum are unchanged.  The cardiac pacemaker/AICD is noted with leads intact.  No definitive acute osseous abnormalities are seen on this single view.      Impression:   1. No change from the previous study with no evidence for acute cardiopulmonary process.         KAROL CHARLES MD       Electronically Signed and Approved By: KAROL CHARLES MD on 1/15/2022 at 19:03             CT Chest Pulmonary Embolism    Result Date: 1/15/2022  Narrative: PROCEDURE: CT CHEST PULMONARY EMBOLISM W CONTRAST  COMPARISON:  None INDICATIONS: SHORTNESS OF BREATH FOR 2-3 DAYS  TECHNIQUE: After obtaining the patient's consent, CT images were obtained with non-ionic intravenous contrast material.   PROTOCOL:   Standard imaging protocol performed    RADIATION:   DLP: 668.4 mGy*cm   Automated exposure control was utilized to minimize radiation dose. CONTRAST: 75cc Isovue 370 I.V.  FINDINGS: No significant focal filling defects are observed to indicate the presence of pulmonary embolism.  Multifocal rounded ground-glass appearing infiltrates are seen throughout the central to peripheral/subpleural aspect of the lungs bilaterally.  Associated septal thickening is noted.  No pleural effusions are observed.  No significant hilar, mediastinal, or axillary lymphadenopathy is seen. A normal aortic arch branching pattern is noted. The thyroid gland is unremarkable.  A sliding hiatal hernia is noted.  The limited evaluation of the upper abdomen demonstrates no definitive acute abnormalities.  No acute osseous abnormalities are seen.       Impression:   1. No evidence for pulmonary embolism. 2. Evidence for developing  atypical/viral infection or multifocal pneumonia.  The findings suggest changes of COVID-19 infection.  Recommend correlation for signs or symptoms of acute infection and follow-up to ensure improvement/resolution.    KAROL CHARLES MD       Electronically Signed and Approved By: KAROL CHARLES MD on 1/15/2022 at 21:16               Procedures/EKGs:  Procedures    EKG:    Narrative & Impression    HEART RATE= 102  bpm  RR Interval= 592  ms  TX Interval= 164  ms  P Horizontal Axis= -10  deg  P Front Axis= 46  deg  QRSD Interval= 89  ms  QT Interval= 329  ms  QRS Axis= -27  deg  T Wave Axis= 57  deg  - OTHERWISE NORMAL ECG -  Sinus tachycardia  Borderline left axis deviation  Borderline intraventricular conduction delay  When compared with ECG of 07-Dec-2021 12:33:49,         Medical Decision Making:                     MDM  Number of Diagnoses or Management Options  COVID-19 virus detected  Multifocal pneumonia  Diagnosis management comments: The patient presented with a productive cough. The patient is well-appearing and in no respiratory distress. The patient has a normal mental status and is neurologically intact. The patient appears well and is able to tolerate food for fluid by mouth and there's no significant dehydration. There is no respiratory distress and no signs of systemic toxicity. The history, exam, diagnostic testing and current condition do not demonstrate an infectious process such as meningitis, retropharyngeal abscess, epiglottitis, sepsis or other serious bacterial infection requiring further testing, treatment, consultation, or admission at this time. The patient has no additional oxygen requirement. The patient has a chest x-ray that is suggestive of pneumonia. The vital signs have been stable. The patient's condition is stable and appropriate for discharge. The patient was advised to return for worsening fever, respiratory distress, intractable vomiting, weakness, shortness of breath, chest  pain, or altered mental status. The patient will pursue further outpatient evaluation with the primary care physician. The patient has expressed a clear and thorough understanding and agreed to follow-up as instructed.         Amount and/or Complexity of Data Reviewed  Clinical lab tests: reviewed and ordered  Tests in the radiology section of CPT®: reviewed and ordered  Tests in the medicine section of CPT®: reviewed and ordered  Decide to obtain previous medical records or to obtain history from someone other than the patient: yes  Obtain history from someone other than the patient: yes (spouse)    Risk of Complications, Morbidity, and/or Mortality  Presenting problems: moderate  Diagnostic procedures: moderate  Management options: low    Patient Progress  Patient progress: stable       Final diagnoses:   COVID-19 virus detected   Multifocal pneumonia        Disposition:  ED Disposition     ED Disposition Condition Comment    Discharge Stable            Irina Sanches, APRN  01/15/22 [id number]

## 2022-01-16 NOTE — DISCHARGE INSTRUCTIONS
Take medications as prescribed.    Get outpatient antibody infusion.  Call Monday to schedule.    Follow-up with PCP.    Return for new or worsening symptoms including oxygen under 90%

## 2022-01-16 NOTE — PROGRESS NOTES
Called patient, verified name and date of birth, positive COVID results provided, discussed CDC guidelines, patient verbalized understanding.

## 2022-01-20 LAB
BACTERIA SPEC AEROBE CULT: NORMAL
BACTERIA SPEC AEROBE CULT: NORMAL

## 2022-01-24 LAB — QT INTERVAL: 329 MS

## 2022-02-01 ENCOUNTER — TRANSCRIBE ORDERS (OUTPATIENT)
Dept: CARDIOLOGY | Facility: HOSPITAL | Age: 67
End: 2022-02-01

## 2022-02-01 ENCOUNTER — LAB (OUTPATIENT)
Dept: LAB | Facility: HOSPITAL | Age: 67
End: 2022-02-01

## 2022-02-01 DIAGNOSIS — R06.02 SHORTNESS OF BREATH: ICD-10-CM

## 2022-02-01 DIAGNOSIS — R07.9 CHEST PAIN, UNSPECIFIED TYPE: ICD-10-CM

## 2022-02-01 DIAGNOSIS — R07.9 CHEST PAIN, UNSPECIFIED TYPE: Primary | ICD-10-CM

## 2022-02-02 ENCOUNTER — LAB (OUTPATIENT)
Dept: LAB | Facility: HOSPITAL | Age: 67
End: 2022-02-02

## 2022-02-02 DIAGNOSIS — R07.9 CHEST PAIN, UNSPECIFIED TYPE: ICD-10-CM

## 2022-02-02 DIAGNOSIS — R06.02 SHORTNESS OF BREATH: ICD-10-CM

## 2022-02-02 LAB
ALBUMIN SERPL-MCNC: 3.8 G/DL (ref 3.5–5.2)
ALBUMIN/GLOB SERPL: 1.4 G/DL
ALP SERPL-CCNC: 61 U/L (ref 39–117)
ALT SERPL W P-5'-P-CCNC: 31 U/L (ref 1–41)
ANION GAP SERPL CALCULATED.3IONS-SCNC: 9 MMOL/L (ref 5–15)
AST SERPL-CCNC: 17 U/L (ref 1–40)
BILIRUB SERPL-MCNC: 0.8 MG/DL (ref 0–1.2)
BUN SERPL-MCNC: 18 MG/DL (ref 8–23)
BUN/CREAT SERPL: 17.8 (ref 7–25)
CALCIUM SPEC-SCNC: 8.7 MG/DL (ref 8.6–10.5)
CHLORIDE SERPL-SCNC: 104 MMOL/L (ref 98–107)
CHOLEST SERPL-MCNC: 177 MG/DL (ref 0–200)
CO2 SERPL-SCNC: 25 MMOL/L (ref 22–29)
CREAT SERPL-MCNC: 1.01 MG/DL (ref 0.76–1.27)
GFR SERPL CREATININE-BSD FRML MDRD: 74 ML/MIN/1.73
GLOBULIN UR ELPH-MCNC: 2.8 GM/DL
GLUCOSE SERPL-MCNC: 186 MG/DL (ref 65–99)
HDLC SERPL-MCNC: 28 MG/DL (ref 40–60)
LDLC SERPL CALC-MCNC: 96 MG/DL (ref 0–100)
LDLC/HDLC SERPL: 3.07 {RATIO}
NT-PROBNP SERPL-MCNC: 105 PG/ML (ref 0–900)
POTASSIUM SERPL-SCNC: 4.4 MMOL/L (ref 3.5–5.2)
PROT SERPL-MCNC: 6.6 G/DL (ref 6–8.5)
SODIUM SERPL-SCNC: 138 MMOL/L (ref 136–145)
TRIGL SERPL-MCNC: 315 MG/DL (ref 0–150)
VLDLC SERPL-MCNC: 53 MG/DL (ref 5–40)

## 2022-02-02 PROCEDURE — 80061 LIPID PANEL: CPT

## 2022-02-02 PROCEDURE — 36415 COLL VENOUS BLD VENIPUNCTURE: CPT

## 2022-02-02 PROCEDURE — 83880 ASSAY OF NATRIURETIC PEPTIDE: CPT

## 2022-02-02 PROCEDURE — 80053 COMPREHEN METABOLIC PANEL: CPT

## 2022-02-15 ENCOUNTER — OFFICE VISIT (OUTPATIENT)
Dept: NEUROLOGY | Facility: CLINIC | Age: 67
End: 2022-02-15

## 2022-02-15 VITALS
BODY MASS INDEX: 39.36 KG/M2 | HEIGHT: 68 IN | SYSTOLIC BLOOD PRESSURE: 106 MMHG | HEART RATE: 70 BPM | DIASTOLIC BLOOD PRESSURE: 63 MMHG | WEIGHT: 259.7 LBS

## 2022-02-15 DIAGNOSIS — R41.89 BRAIN FOG: ICD-10-CM

## 2022-02-15 DIAGNOSIS — R29.898 WEAKNESS OF BOTH LOWER EXTREMITIES: Primary | ICD-10-CM

## 2022-02-15 PROCEDURE — 99214 OFFICE O/P EST MOD 30 MIN: CPT | Performed by: NURSE PRACTITIONER

## 2022-02-15 NOTE — PROGRESS NOTES
"Chief Complaint  Neurologic Problem ([facility] ED-stroke like symptoms)    Subjective          Ricky W Sturgeon presents to [facility]  On 12/7 he was walking at work and got weak feeling and sat down.  Had some numbness to left leg at that time. Was taken to the ER by ambulance.  In the ER, he states the physician tried to get him to lift his legs, but he was unable to lift either leg.  Symptoms completely resolved several hours later.  Denies any aphasia, weakness to the arms, paresthesia to the arms.  Per ER physician note, he had normal neuro assessment with the exception of bilateral leg weakness. Had Covid and pneumonia in January.  States he's had memory difficulty and brain fog since Covid.       Objective   Vital Signs:   /63   Pulse 70   Ht 172.7 cm (68\")   Wt 118 kg (259 lb 11.2 oz)   BMI 39.49 kg/m²     Physical Exam  HENT:      Head: Normocephalic.   Pulmonary:      Effort: Pulmonary effort is normal.   Neurological:      Mental Status: He is alert and oriented to person, place, and time.      Cranial Nerves: Cranial nerves are intact.      Sensory: Sensation is intact.      Motor: Motor function is intact.      Coordination: Coordination is intact.      Deep Tendon Reflexes: Reflexes are normal and symmetric.        Neurologic Exam     Mental Status   Oriented to person, place, and time.        Result Review :   CBC:  Lab Results   Component Value Date    WBC 5.66 01/15/2022    RBC 4.08 (L) 01/15/2022    HGB 13.1 01/15/2022    HCT 38.9 01/15/2022    MCV 95.3 01/15/2022    MCH 32.1 01/15/2022    MCHC 33.7 01/15/2022    RDW 13.0 01/15/2022     01/15/2022     CMP:  Lab Results   Component Value Date     (H) 02/03/2020    BUN 18 02/02/2022    CREATININE 1.01 02/02/2022    EGFRIFNONA 74 02/02/2022     02/02/2022    K 4.4 02/02/2022     02/02/2022    CALCIUM 8.7 02/02/2022    ALBUMIN 3.80 02/02/2022    BILITOT 0.8 02/02/2022    ALKPHOS " 61 02/02/2022    AST 17 02/02/2022    ALT 31 02/02/2022     LIPID PANEL:  Lab Results   Component Value Date    CHLPL 180 05/08/2021    TRIG 315 (H) 02/02/2022    HDL 28 (L) 02/02/2022    VLDL 53 (H) 02/02/2022    LDL 96 02/02/2022    LDLHDL 3.07 02/02/2022            CTA Neck:   Stable exam.  No evidence of significant internal carotid artery stenosis.    CTA Head:   Stable exam.  No evidence of large vessel occlusion, high-grade stenosis, dissection or   Aneurysm.    CT Head:   There is no hemorrhage, edema, or mass-effect.  The CSF spaces are normal.  There are no abnormal   extra-axial fluid collections.  No skull abnormality demonstrated.  Visualized paranasal sinuses   and middle ear cavities are clear     MoCA: 28/30    Assessment and Plan    Diagnoses and all orders for this visit:    1. Weakness of both lower extremities (Primary)  Assessment & Plan:  Reviewed CT head as well as CTA head and neck.  Images were unrevealing.  Discussed that TIA would typically be unilateral weakness not bilateral lower extremity weakness.  No recurrent episodes.  Vascular risk factors are well managed.  He is on atorvastatin and Plavix for ocular risk reduction.  He is not hypertensive.  He is aware that he should go to the ER immediately at the recurrence of symptoms.  He will notify our office if symptoms recur.      2. Brain fog  Assessment & Plan:  Discussed that this is likely related to post Covid syndrome.  He will notify our office if symptoms do not resolve in the next several months.        Follow Up   Return if symptoms worsen or fail to improve.  Patient was given instructions and counseling regarding his condition or for health maintenance advice. Please see specific information pulled into the AVS if appropriate.

## 2022-02-17 PROBLEM — R29.898 WEAKNESS OF BOTH LOWER EXTREMITIES: Status: ACTIVE | Noted: 2022-02-17

## 2022-02-17 PROBLEM — R41.89 BRAIN FOG: Status: ACTIVE | Noted: 2022-02-17

## 2022-02-17 NOTE — ASSESSMENT & PLAN NOTE
Reviewed CT head as well as CTA head and neck.  Images were unrevealing.  Discussed that TIA would typically be unilateral weakness not bilateral lower extremity weakness.  No recurrent episodes.  Vascular risk factors are well managed.  He is on atorvastatin and Plavix for ocular risk reduction.  He is not hypertensive.  He is aware that he should go to the ER immediately at the recurrence of symptoms.  He will notify our office if symptoms recur.

## 2022-02-17 NOTE — ASSESSMENT & PLAN NOTE
Discussed that this is likely related to post Covid syndrome.  He will notify our office if symptoms do not resolve in the next several months.

## 2022-02-18 ENCOUNTER — TELEPHONE (OUTPATIENT)
Dept: NEUROLOGY | Facility: CLINIC | Age: 67
End: 2022-02-18

## 2022-05-27 ENCOUNTER — LAB (OUTPATIENT)
Dept: LAB | Facility: HOSPITAL | Age: 67
End: 2022-05-27

## 2022-05-27 ENCOUNTER — TRANSCRIBE ORDERS (OUTPATIENT)
Dept: CARDIOLOGY | Facility: HOSPITAL | Age: 67
End: 2022-05-27

## 2022-05-27 DIAGNOSIS — R07.9 CHEST PAIN, UNSPECIFIED TYPE: ICD-10-CM

## 2022-05-27 DIAGNOSIS — R07.9 CHEST PAIN, UNSPECIFIED TYPE: Primary | ICD-10-CM

## 2022-05-27 LAB
ALBUMIN SERPL-MCNC: 4.7 G/DL (ref 3.5–5.2)
ALBUMIN/GLOB SERPL: 1.9 G/DL
ALP SERPL-CCNC: 74 U/L (ref 39–117)
ALT SERPL W P-5'-P-CCNC: 47 U/L (ref 1–41)
ANION GAP SERPL CALCULATED.3IONS-SCNC: 11.2 MMOL/L (ref 5–15)
AST SERPL-CCNC: 28 U/L (ref 1–40)
BILIRUB SERPL-MCNC: 0.6 MG/DL (ref 0–1.2)
BUN SERPL-MCNC: 26 MG/DL (ref 8–23)
BUN/CREAT SERPL: 23.6 (ref 7–25)
CALCIUM SPEC-SCNC: 9.9 MG/DL (ref 8.6–10.5)
CHLORIDE SERPL-SCNC: 100 MMOL/L (ref 98–107)
CHOLEST SERPL-MCNC: 171 MG/DL (ref 0–200)
CO2 SERPL-SCNC: 27.8 MMOL/L (ref 22–29)
CREAT SERPL-MCNC: 1.1 MG/DL (ref 0.76–1.27)
EGFRCR SERPLBLD CKD-EPI 2021: 74 ML/MIN/1.73
GLOBULIN UR ELPH-MCNC: 2.5 GM/DL
GLUCOSE SERPL-MCNC: 156 MG/DL (ref 65–99)
HDLC SERPL-MCNC: 29 MG/DL (ref 40–60)
LDLC SERPL CALC-MCNC: 91 MG/DL (ref 0–100)
LDLC/HDLC SERPL: 2.79 {RATIO}
POTASSIUM SERPL-SCNC: 4.2 MMOL/L (ref 3.5–5.2)
PROT SERPL-MCNC: 7.2 G/DL (ref 6–8.5)
SODIUM SERPL-SCNC: 139 MMOL/L (ref 136–145)
TRIGL SERPL-MCNC: 306 MG/DL (ref 0–150)
VLDLC SERPL-MCNC: 51 MG/DL (ref 5–40)

## 2022-05-27 PROCEDURE — 36415 COLL VENOUS BLD VENIPUNCTURE: CPT

## 2022-05-27 PROCEDURE — 80053 COMPREHEN METABOLIC PANEL: CPT

## 2022-05-27 PROCEDURE — 80061 LIPID PANEL: CPT

## 2022-07-17 ENCOUNTER — APPOINTMENT (OUTPATIENT)
Dept: CT IMAGING | Facility: HOSPITAL | Age: 67
End: 2022-07-17

## 2022-07-17 ENCOUNTER — HOSPITAL ENCOUNTER (EMERGENCY)
Facility: HOSPITAL | Age: 67
Discharge: HOME | End: 2022-07-18
Attending: EMERGENCY MEDICINE | Admitting: EMERGENCY MEDICINE

## 2022-07-17 DIAGNOSIS — M54.81 OCCIPITAL NEURALGIA OF RIGHT SIDE: ICD-10-CM

## 2022-07-17 DIAGNOSIS — R51.9 NONINTRACTABLE EPISODIC HEADACHE, UNSPECIFIED HEADACHE TYPE: Primary | ICD-10-CM

## 2022-07-17 DIAGNOSIS — G45.9 TIA (TRANSIENT ISCHEMIC ATTACK): ICD-10-CM

## 2022-07-17 LAB
ALBUMIN SERPL-MCNC: 4.4 G/DL (ref 3.5–5.2)
ALBUMIN/GLOB SERPL: 1.6 G/DL
ALP SERPL-CCNC: 77 U/L (ref 39–117)
ALT SERPL W P-5'-P-CCNC: 40 U/L (ref 1–41)
ANION GAP SERPL CALCULATED.3IONS-SCNC: 12 MMOL/L (ref 5–15)
AST SERPL-CCNC: 22 U/L (ref 1–40)
BASOPHILS # BLD AUTO: 0.06 10*3/MM3 (ref 0–0.2)
BASOPHILS # BLD AUTO: 0.07 10*3/MM3 (ref 0–0.2)
BASOPHILS NFR BLD AUTO: 1 % (ref 0–1.5)
BASOPHILS NFR BLD AUTO: 1.1 % (ref 0–1.5)
BILIRUB SERPL-MCNC: 0.5 MG/DL (ref 0–1.2)
BILIRUB UR QL STRIP: NEGATIVE
BUN SERPL-MCNC: 19 MG/DL (ref 8–23)
BUN/CREAT SERPL: 18.3 (ref 7–25)
CALCIUM SPEC-SCNC: 9.4 MG/DL (ref 8.6–10.5)
CHLORIDE SERPL-SCNC: 105 MMOL/L (ref 98–107)
CLARITY UR: CLEAR
CO2 SERPL-SCNC: 25 MMOL/L (ref 22–29)
COLOR UR: YELLOW
CREAT SERPL-MCNC: 1.04 MG/DL (ref 0.76–1.27)
DEPRECATED RDW RBC AUTO: 42.1 FL (ref 37–54)
DEPRECATED RDW RBC AUTO: 43 FL (ref 37–54)
EGFRCR SERPLBLD CKD-EPI 2021: 79.2 ML/MIN/1.73
EOSINOPHIL # BLD AUTO: 0.18 10*3/MM3 (ref 0–0.4)
EOSINOPHIL # BLD AUTO: 0.19 10*3/MM3 (ref 0–0.4)
EOSINOPHIL NFR BLD AUTO: 2.9 % (ref 0.3–6.2)
EOSINOPHIL NFR BLD AUTO: 3 % (ref 0.3–6.2)
ERYTHROCYTE [DISTWIDTH] IN BLOOD BY AUTOMATED COUNT: 12.2 % (ref 12.3–15.4)
ERYTHROCYTE [DISTWIDTH] IN BLOOD BY AUTOMATED COUNT: 12.4 % (ref 12.3–15.4)
ERYTHROCYTE [SEDIMENTATION RATE] IN BLOOD: 8 MM/HR (ref 0–20)
GLOBULIN UR ELPH-MCNC: 2.7 GM/DL
GLUCOSE SERPL-MCNC: 219 MG/DL (ref 65–99)
GLUCOSE UR STRIP-MCNC: ABNORMAL MG/DL
HCT VFR BLD AUTO: 38.6 % (ref 37.5–51)
HCT VFR BLD AUTO: 38.8 % (ref 37.5–51)
HGB BLD-MCNC: 13.2 G/DL (ref 13–17.7)
HGB BLD-MCNC: 13.7 G/DL (ref 13–17.7)
HGB UR QL STRIP.AUTO: NEGATIVE
IMM GRANULOCYTES # BLD AUTO: 0.02 10*3/MM3 (ref 0–0.05)
IMM GRANULOCYTES # BLD AUTO: 0.02 10*3/MM3 (ref 0–0.05)
IMM GRANULOCYTES NFR BLD AUTO: 0.3 % (ref 0–0.5)
IMM GRANULOCYTES NFR BLD AUTO: 0.3 % (ref 0–0.5)
KETONES UR QL STRIP: NEGATIVE
LEUKOCYTE ESTERASE UR QL STRIP.AUTO: NEGATIVE
LYMPHOCYTES # BLD AUTO: 2.3 10*3/MM3 (ref 0.7–3.1)
LYMPHOCYTES # BLD AUTO: 2.67 10*3/MM3 (ref 0.7–3.1)
LYMPHOCYTES NFR BLD AUTO: 38.3 % (ref 19.6–45.3)
LYMPHOCYTES NFR BLD AUTO: 41.3 % (ref 19.6–45.3)
MAGNESIUM SERPL-MCNC: 2.2 MG/DL (ref 1.6–2.4)
MCH RBC QN AUTO: 32.4 PG (ref 26.6–33)
MCH RBC QN AUTO: 33.3 PG (ref 26.6–33)
MCHC RBC AUTO-ENTMCNC: 34.2 G/DL (ref 31.5–35.7)
MCHC RBC AUTO-ENTMCNC: 35.3 G/DL (ref 31.5–35.7)
MCV RBC AUTO: 94.4 FL (ref 79–97)
MCV RBC AUTO: 94.6 FL (ref 79–97)
MONOCYTES # BLD AUTO: 0.5 10*3/MM3 (ref 0.1–0.9)
MONOCYTES # BLD AUTO: 0.65 10*3/MM3 (ref 0.1–0.9)
MONOCYTES NFR BLD AUTO: 10 % (ref 5–12)
MONOCYTES NFR BLD AUTO: 8.3 % (ref 5–12)
NEUTROPHILS NFR BLD AUTO: 2.87 10*3/MM3 (ref 1.7–7)
NEUTROPHILS NFR BLD AUTO: 2.94 10*3/MM3 (ref 1.7–7)
NEUTROPHILS NFR BLD AUTO: 44.4 % (ref 42.7–76)
NEUTROPHILS NFR BLD AUTO: 49.1 % (ref 42.7–76)
NITRITE UR QL STRIP: NEGATIVE
NRBC BLD AUTO-RTO: 0 /100 WBC (ref 0–0.2)
NRBC BLD AUTO-RTO: 0 /100 WBC (ref 0–0.2)
PH UR STRIP.AUTO: 5.5 [PH] (ref 5–8)
PLATELET # BLD AUTO: 233 10*3/MM3 (ref 140–450)
PLATELET # BLD AUTO: 259 10*3/MM3 (ref 140–450)
PMV BLD AUTO: 10.3 FL (ref 6–12)
PMV BLD AUTO: 9.7 FL (ref 6–12)
POTASSIUM SERPL-SCNC: 3.9 MMOL/L (ref 3.5–5.2)
PROT SERPL-MCNC: 7.1 G/DL (ref 6–8.5)
PROT UR QL STRIP: NEGATIVE
QT INTERVAL: 426 MS
RBC # BLD AUTO: 4.08 10*6/MM3 (ref 4.14–5.8)
RBC # BLD AUTO: 4.11 10*6/MM3 (ref 4.14–5.8)
SODIUM SERPL-SCNC: 142 MMOL/L (ref 136–145)
SP GR UR STRIP: 1.02 (ref 1–1.03)
TROPONIN T SERPL-MCNC: <0.01 NG/ML (ref 0–0.03)
UROBILINOGEN UR QL STRIP: ABNORMAL
WBC NRBC COR # BLD: 6 10*3/MM3 (ref 3.4–10.8)
WBC NRBC COR # BLD: 6.47 10*3/MM3 (ref 3.4–10.8)

## 2022-07-17 PROCEDURE — 93005 ELECTROCARDIOGRAM TRACING: CPT | Performed by: EMERGENCY MEDICINE

## 2022-07-17 PROCEDURE — 85730 THROMBOPLASTIN TIME PARTIAL: CPT | Performed by: EMERGENCY MEDICINE

## 2022-07-17 PROCEDURE — 99283 EMERGENCY DEPT VISIT LOW MDM: CPT

## 2022-07-17 PROCEDURE — 81003 URINALYSIS AUTO W/O SCOPE: CPT | Performed by: EMERGENCY MEDICINE

## 2022-07-17 PROCEDURE — 70498 CT ANGIOGRAPHY NECK: CPT

## 2022-07-17 PROCEDURE — 84484 ASSAY OF TROPONIN QUANT: CPT | Performed by: EMERGENCY MEDICINE

## 2022-07-17 PROCEDURE — 85610 PROTHROMBIN TIME: CPT | Performed by: EMERGENCY MEDICINE

## 2022-07-17 PROCEDURE — 85652 RBC SED RATE AUTOMATED: CPT | Performed by: EMERGENCY MEDICINE

## 2022-07-17 PROCEDURE — 87086 URINE CULTURE/COLONY COUNT: CPT | Performed by: EMERGENCY MEDICINE

## 2022-07-17 PROCEDURE — 36415 COLL VENOUS BLD VENIPUNCTURE: CPT | Performed by: EMERGENCY MEDICINE

## 2022-07-17 PROCEDURE — 80053 COMPREHEN METABOLIC PANEL: CPT | Performed by: EMERGENCY MEDICINE

## 2022-07-17 PROCEDURE — 85025 COMPLETE CBC W/AUTO DIFF WBC: CPT | Performed by: EMERGENCY MEDICINE

## 2022-07-17 PROCEDURE — 83735 ASSAY OF MAGNESIUM: CPT | Performed by: EMERGENCY MEDICINE

## 2022-07-17 PROCEDURE — 70496 CT ANGIOGRAPHY HEAD: CPT

## 2022-07-17 PROCEDURE — 70450 CT HEAD/BRAIN W/O DYE: CPT

## 2022-07-17 RX ORDER — SODIUM CHLORIDE 0.9 % (FLUSH) 0.9 %
10 SYRINGE (ML) INJECTION AS NEEDED
Status: DISCONTINUED | OUTPATIENT
Start: 2022-07-17 | End: 2022-07-18 | Stop reason: HOSPADM

## 2022-07-18 VITALS
DIASTOLIC BLOOD PRESSURE: 94 MMHG | SYSTOLIC BLOOD PRESSURE: 121 MMHG | WEIGHT: 260.36 LBS | TEMPERATURE: 97.4 F | HEART RATE: 63 BPM | BODY MASS INDEX: 39.46 KG/M2 | OXYGEN SATURATION: 95 % | HEIGHT: 68 IN | RESPIRATION RATE: 16 BRPM

## 2022-07-18 LAB
APTT PPP: 28.2 SECONDS (ref 24.2–34.2)
INR PPP: 1.05 (ref 0.86–1.15)
PROTHROMBIN TIME: 13.8 SECONDS (ref 11.8–14.9)

## 2022-07-18 PROCEDURE — 70496 CT ANGIOGRAPHY HEAD: CPT

## 2022-07-18 PROCEDURE — 0 IOPAMIDOL PER 1 ML: Performed by: EMERGENCY MEDICINE

## 2022-07-18 RX ADMIN — IOPAMIDOL 100 ML: 755 INJECTION, SOLUTION INTRAVENOUS at 00:03

## 2022-07-18 NOTE — DISCHARGE INSTRUCTIONS
Please call the neurologist today for an appointment.    Please discuss possible need for MRI of the brain.    Return to emergency medially for intractable headache, vomiting, fever, neck stiffness, rash, change in speech, change in swallowing, numbness or weakness in your extremities, difficulties with coordination or walking or any new symptoms you are concerned with

## 2022-07-18 NOTE — ED PROVIDER NOTES
"Time: 9:20 PM EDT  Arrived by: private car  Chief Complaint: Headache  History provided by: Patient  History is limited by: Not limited    History of Present Illness:  Patient is a 67 y.o. year old male with a hx of TIA and chronic R occipital headaches that presents to the emergency department with a headache that started this morning. Pt woke up around 5 am this morning with a headache.  The patient has chronic headaches that is located on the scalp of the posterior right occiput and radiates to the right temporal scalp.  The patient states that these headaches are chronic with intermittent exacerbations.  He did feel that it was worse this morning.  He has been told in the past that it is from nerves in his neck. Pt states that when he woke up he was \"gasping for air\" and he had some slurred speech with some confusion.  The patient states that he was yelling for his brother that has been passed away for 4 years.  This weekend does mehran the 4-year anniversary of the patient's brother's death.  The patient states that that has been very traumatic for him.  Pt had some sensation that went from his head to feet during episode.  He states he cannot describe the sensation and it was not painful.  Pt was able to go back to sleep until 9 am and when he woke up he still had a headache, located just on the right posterior occiput and felt fatigued and short of breath. Pt denies any fever, chills, chest pain or pressure.  The patient denies any rash or neck stiffness.  The patient had no other neurodeficits such as weakness or numbness in extremities, change in swallowing, change in vision, ataxia, dizziness or lack of coordination    HPI    Similar Symptoms Previously: Yes, Chronic  Recently seen: No      Patient Care Team  Primary Care Provider: Martha Suresh APRN    Past Medical History:     Allergies   Allergen Reactions   • Sulfa Antibiotics Angioedema     Past Medical History:   Diagnosis Date   • Allergic rhinitis "    • Arthritis    • Bulging lumbar disc    • Chest pain    • Chronic back pain    • Congestive heart failure (CHF) ([facility])    • DM (diabetes mellitus) ([facility])     type 2   • Dysphagia    • Elevated cholesterol    • Follow-up examination following surgery 10/09/2014   • Gastroesophageal reflux disease    • GERD (gastroesophageal reflux disease)    • Heart attack ([facility])     7/2019   • Hemorrhoids, external    • High blood pressure    • High cholesterol    • Lumbago     LOW BACK PAIN   • Mood disorder ([facility])    • Prostate disorder    • Shortness of breath    • Sleep apnea    • TIA (transient ischemic attack)     unable to do MRI to confirm     Past Surgical History:   Procedure Laterality Date   • COLONOSCOPY     • COLONOSCOPY N/A 7/28/2021    Procedure: COLONOSCOPY WITH POLYPECTOMY;  Surgeon: Jonas Barriga MD;  Location: [facility] ENDOSCOPY;  Service: General;  Laterality: N/A;  DIVERTICULOSIS, COLON POLYPS   • CORONARY ANGIOPLASTY WITH STENT PLACEMENT     • ENDOSCOPY     • ENDOSCOPY N/A 7/28/2021    Procedure: ESOPHAGOGASTRODUODENOSCOPY WITH BIOPSIES;  Surgeon: Jonas Barriga MD;  Location: [facility] ENDOSCOPY;  Service: General;  Laterality: N/A;  GASTRITIS   • HEEL SPUR SURGERY Bilateral     HEEL SPURS REMOVED FROM BOTH HEELS   • INGUINAL HERNIA REPAIR Left    • PACEMAKER IMPLANTATION     • SHOULDER SURGERY Bilateral      Family History   Problem Relation Age of Onset   • Colon cancer Mother         70S   • Diabetes Mother    • Colon cancer Father         70S   • Stroke Other    • Cancer Other    • Esophageal cancer Brother    • Malig Hyperthermia Neg Hx        Home Medications:  Prior to Admission medications    Medication Sig Start Date End Date Taking? Authorizing Provider   albuterol sulfate  (90 Base) MCG/ACT inhaler Inhale 2 puffs Every 6 (Six) Hours As Needed for Wheezing. 1/15/22   Irina Sanches APRN   ASPIRIN 81 PO Take 81 mg by mouth Daily.    Provider, MD Daniel   atorvastatin (LIPITOR) 40 MG tablet  Take 40 mg by mouth Daily.    Daniel Chatman MD   clopidogrel (Plavix) 75 MG tablet Take 75 mg by mouth.    Daniel Chatman MD   Dulaglutide (Trulicity) 0.75 MG/0.5ML solution pen-injector Inject 0.75 mg under the skin into the appropriate area as directed 1 (One) Time Per Week. Sunday    Daniel Chatman MD   escitalopram (LEXAPRO) 20 MG tablet Take 20 mg by mouth Daily.    Daniel Chatman MD   furosemide (LASIX) 40 MG tablet Take 40 mg by mouth 2 (Two) Times a Day.    Daniel Chatman MD   HYDROcodone-acetaminophen (NORCO) 5-325 MG per tablet Take 1 tablet by mouth 2 (Two) Times a Day.    Daniel Chatman MD   linaclotide (Linzess) 72 MCG capsule capsule Take 72 mcg by mouth Every Morning Before Breakfast.    Daniel Chatman MD   losartan (COZAAR) 100 MG tablet Take 100 mg by mouth Daily.    Daniel Chatman MD   metFORMIN ER (GLUCOPHAGE-XR) 500 MG 24 hr tablet Take 1,000 mg by mouth Daily. 6/19/21   Daniel Chatman MD   metoprolol succinate XL (TOPROL-XL) 25 MG 24 hr tablet Take 25 mg by mouth Daily.    Daniel Chatman MD   ondansetron ODT (ZOFRAN-ODT) 4 MG disintegrating tablet Place 1 tablet on the tongue 4 (Four) Times a Day As Needed for Nausea or Vomiting. 1/15/22   Irina Sanches APRN   pantoprazole (PROTONIX) 40 MG EC tablet Take 40 mg by mouth Daily. 5/5/21   Daniel Chatman MD        Social History:   Social History     Tobacco Use   • Smoking status: Never Smoker   • Smokeless tobacco: Never Used   Vaping Use   • Vaping Use: Never used   Substance Use Topics   • Alcohol use: Not Currently   • Drug use: Never         Review of Systems:  Review of Systems   Constitutional: Negative for chills, diaphoresis and fever.   HENT: Negative for congestion, postnasal drip, rhinorrhea and sore throat.    Eyes: Negative for photophobia.   Respiratory: Positive for shortness of breath. Negative for cough and chest tightness.    Cardiovascular: Negative for  "chest pain, palpitations and leg swelling.   Gastrointestinal: Negative for abdominal pain, diarrhea, nausea and vomiting.   Genitourinary: Negative for difficulty urinating, dysuria, flank pain, frequency, hematuria and urgency.   Musculoskeletal: Negative for neck pain and neck stiffness.   Skin: Negative for pallor and rash.   Neurological: Positive for speech difficulty and headaches. Negative for dizziness, syncope, weakness and numbness.   Hematological: Negative for adenopathy. Does not bruise/bleed easily.   Psychiatric/Behavioral: Negative.         Physical Exam:  /86   Pulse 64   Temp 98 °F (36.7 °C) (Oral)   Resp 18   Ht 172.7 cm (68\")   Wt 118 kg (260 lb 5.8 oz)   SpO2 95%   BMI 39.59 kg/m²     Physical Exam  Vitals and nursing note reviewed.   Constitutional:       General: He is not in acute distress.     Appearance: Normal appearance. He is not ill-appearing, toxic-appearing or diaphoretic.   HENT:      Head: Normocephalic and atraumatic.      Comments: No palpable temporal artery     Mouth/Throat:      Mouth: Mucous membranes are moist.   Eyes:      General: No visual field deficit.     Pupils: Pupils are equal, round, and reactive to light.   Neck:      Vascular: No carotid bruit.   Cardiovascular:      Rate and Rhythm: Normal rate and regular rhythm.      Pulses: Normal pulses.           Carotid pulses are 2+ on the right side and 2+ on the left side.       Radial pulses are 2+ on the right side and 2+ on the left side.        Femoral pulses are 2+ on the right side and 2+ on the left side.       Popliteal pulses are 2+ on the right side and 2+ on the left side.        Dorsalis pedis pulses are 2+ on the right side and 2+ on the left side.        Posterior tibial pulses are 2+ on the right side and 2+ on the left side.      Heart sounds: Normal heart sounds. No murmur heard.  Pulmonary:      Effort: Pulmonary effort is normal. No accessory muscle usage, respiratory distress or " retractions.      Breath sounds: Normal breath sounds. No wheezing, rhonchi or rales.   Abdominal:      General: Abdomen is flat. There is no distension.      Palpations: Abdomen is soft. There is no mass.      Tenderness: There is no abdominal tenderness. There is no right CVA tenderness, left CVA tenderness, guarding or rebound.      Comments: No rigidity   Musculoskeletal:         General: No swelling, tenderness or deformity.      Cervical back: Neck supple. No rigidity or tenderness. No pain with movement. Normal range of motion.      Right lower leg: No edema.      Left lower leg: No edema.   Skin:     General: Skin is warm and dry.      Capillary Refill: Capillary refill takes less than 2 seconds.      Coloration: Skin is not jaundiced or pale.      Findings: No erythema.   Neurological:      General: No focal deficit present.      Mental Status: He is alert and oriented to person, place, and time. Mental status is at baseline.      Cranial Nerves: Cranial nerves are intact. No cranial nerve deficit, dysarthria or facial asymmetry.      Sensory: Sensation is intact. No sensory deficit.      Motor: No weakness or abnormal muscle tone.      Coordination: Coordination is intact. Coordination normal. Finger-Nose-Finger Test normal.      Comments:  NIH Stroke Scale: 0   Psychiatric:         Mood and Affect: Mood normal.         Behavior: Behavior normal.                Medications in the Emergency Department:  Medications   sodium chloride 0.9 % flush 10 mL (has no administration in time range)   iopamidol (ISOVUE-370) 76 % injection 100 mL (100 mL Intravenous Given 7/18/22 0003)        Labs  Lab Results (last 24 hours)     Procedure Component Value Units Date/Time    CBC & Differential [[id number]]  (Abnormal) Collected: 07/17/22 1925    Specimen: Blood Updated: 07/17/22 1952    Narrative:      The following orders were created for panel order CBC & Differential.  Procedure                                Abnormality         Status                     ---------                               -----------         ------                     CBC Auto Differential[[id number]]        Abnormal            Final result                 Please view results for these tests on the individual orders.    Comprehensive Metabolic Panel [[id number]]  (Abnormal) Collected: 07/17/22 1925    Specimen: Blood Updated: 07/17/22 2014     Glucose 219 mg/dL      BUN 19 mg/dL      Creatinine 1.04 mg/dL      Sodium 142 mmol/L      Potassium 3.9 mmol/L      Chloride 105 mmol/L      CO2 25.0 mmol/L      Calcium 9.4 mg/dL      Total Protein 7.1 g/dL      Albumin 4.40 g/dL      ALT (SGPT) 40 U/L      AST (SGOT) 22 U/L      Alkaline Phosphatase 77 U/L      Total Bilirubin 0.5 mg/dL      Globulin 2.7 gm/dL      A/G Ratio 1.6 g/dL      BUN/Creatinine Ratio 18.3     Anion Gap 12.0 mmol/L      eGFR 79.2 mL/min/1.73      Comment: National Kidney Foundation and American Society of Nephrology (ASN) Task Force recommended calculation based on the Chronic Kidney Disease Epidemiology Collaboration (CKD-EPI) equation refit without adjustment for race.       Narrative:      GFR Normal >60  Chronic Kidney Disease <60  Kidney Failure <15      CBC Auto Differential [[id number]]  (Abnormal) Collected: 07/17/22 1925    Specimen: Blood Updated: 07/17/22 1952     WBC 6.00 10*3/mm3      RBC 4.11 10*6/mm3      Hemoglobin 13.7 g/dL      Hematocrit 38.8 %      MCV 94.4 fL      MCH 33.3 pg      MCHC 35.3 g/dL      RDW 12.2 %      RDW-SD 42.1 fl      MPV 10.3 fL      Platelets 259 10*3/mm3      Neutrophil % 49.1 %      Lymphocyte % 38.3 %      Monocyte % 8.3 %      Eosinophil % 3.0 %      Basophil % 1.0 %      Immature Grans % 0.3 %      Neutrophils, Absolute 2.94 10*3/mm3      Lymphocytes, Absolute 2.30 10*3/mm3      Monocytes, Absolute 0.50 10*3/mm3      Eosinophils, Absolute 0.18 10*3/mm3      Basophils, Absolute 0.06 10*3/mm3      Immature Grans, Absolute 0.02 10*3/mm3       nRBC 0.0 /100 WBC     Magnesium [[id number]]  (Normal) Collected: 07/17/22 1925    Specimen: Blood Updated: 07/17/22 2205     Magnesium 2.2 mg/dL     Sedimentation Rate [[id number]]  (Normal) Collected: 07/17/22 1925    Specimen: Blood Updated: 07/17/22 2201     Sed Rate 8 mm/hr     Troponin [[id number]]  (Normal) Collected: 07/17/22 1925    Specimen: Blood Updated: 07/17/22 2211     Troponin T <0.010 ng/mL     Narrative:      Troponin T Reference Range:  <= 0.03 ng/mL-   Negative for AMI  >0.03 ng/mL-     Abnormal for myocardial necrosis.  Clinicians would have to utilize clinical acumen, EKG, Troponin and serial changes to determine if it is an Acute Myocardial Infarction or myocardial injury due to an underlying chronic condition.       Results may be falsely decreased if patient taking Biotin.      Urinalysis With Culture If Indicated - Urine, Clean Catch [[id number]]  (Abnormal) Collected: 07/17/22 2218    Specimen: Urine, Clean Catch Updated: 07/17/22 2226     Color, UA Yellow     Appearance, UA Clear     pH, UA 5.5     Specific Gravity, UA 1.020     Glucose,  mg/dL (Trace)     Ketones, UA Negative     Bilirubin, UA Negative     Blood, UA Negative     Protein, UA Negative     Leuk Esterase, UA Negative     Nitrite, UA Negative     Urobilinogen, UA 1.0 E.U./dL    Narrative:      In absence of clinical symptoms, the presence of pyuria, bacteria, and/or nitrites on the urinalysis result does not correlate with infection.  Urine microscopic not indicated.    Urine Culture - Urine, Urine, Clean Catch [[id number]] Collected: 07/17/22 2218    Specimen: Urine, Clean Catch Updated: 07/17/22 2226    CBC & Differential [[id number]]  (Abnormal) Collected: 07/17/22 2232    Specimen: Blood Updated: 07/17/22 2359    Narrative:      The following orders were created for panel order CBC & Differential.  Procedure                               Abnormality         Status                     ---------                                -----------         ------                     CBC Auto Differential[[id number]]        Abnormal            Final result               Scan Slide[[id number]]                                                                    Please view results for these tests on the individual orders.    aPTT [[id number]]  (Normal) Collected: 07/17/22 2351    Specimen: Blood Updated: 07/18/22 0011     PTT 28.2 seconds     Protime-INR [[id number]]  (Normal) Collected: 07/17/22 2351    Specimen: Blood Updated: 07/18/22 0011     Protime 13.8 Seconds      INR 1.05    Narrative:      Suggested Therapeutic Ranges For Oral Anticoagulant Therapy:  Level of Therapy                      INR Target Range  Standard Dose                            2.0-3.0  High Dose                                2.5-3.5  Patients not receiving anticoagulant  Therapy Normal Range                     0.86-1.15    CBC Auto Differential [[id number]]  (Abnormal) Collected: 07/17/22 2351    Specimen: Blood Updated: 07/17/22 2359     WBC 6.47 10*3/mm3      RBC 4.08 10*6/mm3      Hemoglobin 13.2 g/dL      Hematocrit 38.6 %      MCV 94.6 fL      MCH 32.4 pg      MCHC 34.2 g/dL      RDW 12.4 %      RDW-SD 43.0 fl      MPV 9.7 fL      Platelets 233 10*3/mm3      Neutrophil % 44.4 %      Lymphocyte % 41.3 %      Monocyte % 10.0 %      Eosinophil % 2.9 %      Basophil % 1.1 %      Immature Grans % 0.3 %      Neutrophils, Absolute 2.87 10*3/mm3      Lymphocytes, Absolute 2.67 10*3/mm3      Monocytes, Absolute 0.65 10*3/mm3      Eosinophils, Absolute 0.19 10*3/mm3      Basophils, Absolute 0.07 10*3/mm3      Immature Grans, Absolute 0.02 10*3/mm3      nRBC 0.0 /100 WBC            Imaging:  CT Head Without Contrast    Result Date: 7/17/2022  PROCEDURE: CT HEAD WO CONTRAST  COMPARISON:  [facility], CT, CT HEAD WO CONTRAST STROKE PROTOCOL, 12/07/2021, 12:07.  [facility], CT, CT ANGIOGRAM HEAD, 12/07/2021, 13:16.  INDICATIONS: weakness, headache, and  difficulty finishing thoughts today  PROTOCOL:   Standard imaging protocol performed    RADIATION:   DLP: 1081.2 mGy*cm   MA and/or KV was adjusted to minimize radiation dose.     TECHNIQUE: After obtaining the patient's consent, CT images were obtained without non-ionic intravenous contrast material.  FINDINGS:  No acute intracranial hemorrhage, mass, mass effect, or evidence of acute ischemia is seen. The ventricular system is nondilated. The basilar cisterns are patent. The skull is intact without displaced fracture. The visualized mastoid air cells are clear.  There is mild mucosal thickening in posterior left ethmoid air cell, as well as partially included in the right maxillary sinus.        No acute intracranial abnormality.     TRES JOHNSON MD       Electronically Signed and Approved By: TRES JOHNSON MD on 7/17/2022 at 21:15             CT Angiogram Neck    Result Date: 7/18/2022  PROCEDURE: CT ANGIOGRAM NECK, 7/17/2022, 23:57 CT ANGIOGRAM HEAD W AI ANALYSIS OF LVO, 7/17/2022, 23:57  COMPARISON: [facility], CT, CT ANGIOGRAM NECK, 12/07/2021, 13:16.  [facility], CT, CT HEAD WO CONTRAST, 7/17/2022, 20:42.  [facility], CT, CT ANGIOGRAM HEAD, 12/07/2021, 13:16.  INDICATIONS: HEADACHE. WEAKNESS.  PROTOCOL:   Standard imaging protocol performed    RADIATION:   DLP: 885mGy*cm   Automated exposure control was utilized to minimize radiation dose. CONTRAST: 93cc Isovue 370 I.V. LABS:   eGFR: >60ml/min/1.73m2  TECHNIQUE: After obtaining the patient's consent, CT images of the neck were obtained without and with non-ionic intravenous contrast material. Multi-planar reformatted/3-D images were created to optimize visualization of vascular anatomy. Unless otherwise stated in this report, all vascular stenoses involving the internal carotid arteries reported for this examination are derived by dividing the lesion diameter by the diameter of the normal internal carotid  artery more distally.  FINDINGS:  Neck:  There is atherosclerotic calcification at the origin of each internal carotid artery without hemodynamically significant stenosis.  Evaluation of proximal to mid ICAs is limited due to motion artifact.  No common carotid artery stenosis is noted.  There is stable narrowing at the origin of the left vertebral artery.  No significant right vertebral artery narrowing is noted.  Head:  There are calcifications in the carotid siphons without significant stenosis.  Ectasia of the supraclinoid right ICA is stable.  Smaller caliber A1 segment of the left anterior communicating artery is stable, likely a normal variant.  No significant stenosis is seen of the middle cerebral arteries, posterior cerebral arteries, or basilar artery.        1. Motion artifact limits evaluation of the proximal to mid internal carotid arteries.  Atherosclerotic calcifications at the origins of the internal carotid arteries without hemodynamically significant stenosis.  2. Stable narrowing at the origin of the left vertebral artery. 3. No hemodynamically significant stenosis seen on CTA head. 4. Stable mild ectasia of the supraclinoid right ICA.     TRES JOHNSON MD       Electronically Signed and Approved By: TRES JOHNSON MD on 7/18/2022 at 1:41             CT Angiogram Head w AI Analysis of LVO    Result Date: 7/18/2022  PROCEDURE: CT ANGIOGRAM NECK, 7/17/2022, 23:57 CT ANGIOGRAM HEAD W AI ANALYSIS OF LVO, 7/17/2022, 23:57  COMPARISON: [facility], CT, CT ANGIOGRAM NECK, 12/07/2021, 13:16.  [facility], CT, CT HEAD WO CONTRAST, 7/17/2022, 20:42.  [facility], CT, CT ANGIOGRAM HEAD, 12/07/2021, 13:16.  INDICATIONS: HEADACHE. WEAKNESS.  PROTOCOL:   Standard imaging protocol performed    RADIATION:   DLP: 885mGy*cm   Automated exposure control was utilized to minimize radiation dose. CONTRAST: 93cc Isovue 370 I.V. LABS:   eGFR: >60ml/min/1.73m2  TECHNIQUE: After  obtaining the patient's consent, CT images of the neck were obtained without and with non-ionic intravenous contrast material. Multi-planar reformatted/3-D images were created to optimize visualization of vascular anatomy. Unless otherwise stated in this report, all vascular stenoses involving the internal carotid arteries reported for this examination are derived by dividing the lesion diameter by the diameter of the normal internal carotid artery more distally.  FINDINGS:  Neck:  There is atherosclerotic calcification at the origin of each internal carotid artery without hemodynamically significant stenosis.  Evaluation of proximal to mid ICAs is limited due to motion artifact.  No common carotid artery stenosis is noted.  There is stable narrowing at the origin of the left vertebral artery.  No significant right vertebral artery narrowing is noted.  Head:  There are calcifications in the carotid siphons without significant stenosis.  Ectasia of the supraclinoid right ICA is stable.  Smaller caliber A1 segment of the left anterior communicating artery is stable, likely a normal variant.  No significant stenosis is seen of the middle cerebral arteries, posterior cerebral arteries, or basilar artery.        1. Motion artifact limits evaluation of the proximal to mid internal carotid arteries.  Atherosclerotic calcifications at the origins of the internal carotid arteries without hemodynamically significant stenosis.  2. Stable narrowing at the origin of the left vertebral artery. 3. No hemodynamically significant stenosis seen on CTA head. 4. Stable mild ectasia of the supraclinoid right ICA.     TRES JOHNSON MD       Electronically Signed and Approved By: TRES JOHNSON MD on 7/18/2022 at 1:41               Procedures:  Procedures    Progress  ED Course as of 07/18/22 0234   Sun Jul 17, 2022   3844 EKG:    Rhythm: Paced rhythm  Rate: 63  Intervals: Normal QT interval  T-wave: Nonspecific T wave flattening,  probable T wave inversion in aVL  ST Segment: There is no obvious pathological ST elevation or ST depression    EKG Comparison: The EKG is changed from EKG performed January 15, 2022 as that EKG is not paced due to a heart rate of 102    Interpreted by me   [SD]      ED Course User Index  [SD] Omid Schwab DO                            Medical Decision Making:  MDM  Number of Diagnoses or Management Options  Nonintractable episodic headache, unspecified headache type  Occipital neuralgia of right side  TIA (transient ischemic attack)  Diagnosis management comments:     ABCD² Score for TIA - MDCalc  Calculated on Jul 18 2022 2:07 AM  3 points -> Per the validation study, 0-3 points: Low Risk 2-Day Stroke Risk: 1.0% 7-Day Stroke Risk: 1.2% 90-Day Stroke Risk: 3.1%    The patient has chronic right occipital headaches.  The patient has been evaluated in this in the past by his physician.  The patient's pain in the right occipital region is no different than his headaches in the past.  The location of the patient's pain would be consistent with occipital neuralgia.  The patient had no signs of meningitis including neck stiffness, fever, rash or global headache.  The patient had a normal white blood cell count.  The patient's only neurodeficit this morning upon waking was difficulties with speech.  That was very transient.  The patient had no neurodeficits in the emergency room.  The patient had a normal CT and normal CT angiogram.  I discussed the case with the neurologist on-call who feels the patient could be discharged home and followed up in the office.  I discussed with the patient he was very comfortable with the plan.  The patient was given very specific instructions on when and why to return to the emergency room.  The patient felt comfortable with that plan and comfortable for discharge.       Amount and/or Complexity of Data Reviewed  Clinical lab tests: reviewed  Tests in the radiology section of CPT®:  reviewed  Tests in the medicine section of CPT®: reviewed  Discuss the patient with other providers: yes (I discussed case with Dr. Ackerman who feels the patient could be evaluated in the outpatient basis.  Specifically because the patient only had speech deficits and that his headache has been chronic.)                 Final diagnoses:   Nonintractable episodic headache, unspecified headache type   Occipital neuralgia of right side   TIA (transient ischemic attack)        Disposition:  ED Disposition     ED Disposition   Discharge    Condition   Stable    Comment   --             Documentation assistance provided by VÍCTOR SEPULVEDA JR. acting as scribe for Omid Schwab DO. Information recorded by the scribe was done at my direction and has been verified and validated by me.        Víctor Sepulveda Jr.  07/17/22 [id number]       Omid Schwab DO  07/22/22 1095

## 2022-07-19 LAB — BACTERIA SPEC AEROBE CULT: NO GROWTH

## 2022-12-05 ENCOUNTER — TRANSCRIBE ORDERS (OUTPATIENT)
Dept: CARDIOLOGY | Facility: HOSPITAL | Age: 67
End: 2022-12-05

## 2022-12-05 ENCOUNTER — LAB (OUTPATIENT)
Dept: LAB | Facility: HOSPITAL | Age: 67
End: 2022-12-05

## 2022-12-05 DIAGNOSIS — R53.83 TIREDNESS: ICD-10-CM

## 2022-12-05 DIAGNOSIS — R53.83 TIREDNESS: Primary | ICD-10-CM

## 2022-12-05 PROCEDURE — 84439 ASSAY OF FREE THYROXINE: CPT

## 2022-12-05 PROCEDURE — 36415 COLL VENOUS BLD VENIPUNCTURE: CPT

## 2022-12-05 PROCEDURE — 84443 ASSAY THYROID STIM HORMONE: CPT

## 2022-12-06 LAB
T4 FREE SERPL-MCNC: 1.36 NG/DL (ref 0.93–1.7)
TSH SERPL DL<=0.05 MIU/L-ACNC: 0.89 UIU/ML (ref 0.27–4.2)

## 2023-05-03 ENCOUNTER — TRANSCRIBE ORDERS (OUTPATIENT)
Dept: CARDIOLOGY | Facility: HOSPITAL | Age: 68
End: 2023-05-03
Payer: MEDICARE

## 2023-05-03 ENCOUNTER — LAB (OUTPATIENT)
Dept: LAB | Facility: HOSPITAL | Age: 68
End: 2023-05-03
Payer: MEDICARE

## 2023-05-03 DIAGNOSIS — R07.9 CHEST PAIN, UNSPECIFIED TYPE: ICD-10-CM

## 2023-05-03 DIAGNOSIS — R07.9 CHEST PAIN, UNSPECIFIED TYPE: Primary | ICD-10-CM

## 2023-05-03 LAB
ALBUMIN SERPL-MCNC: 4.3 G/DL (ref 3.5–5.2)
ALBUMIN/GLOB SERPL: 1.7 G/DL
ALP SERPL-CCNC: 84 U/L (ref 39–117)
ALT SERPL W P-5'-P-CCNC: 38 U/L (ref 1–41)
ANION GAP SERPL CALCULATED.3IONS-SCNC: 13.2 MMOL/L (ref 5–15)
AST SERPL-CCNC: 23 U/L (ref 1–40)
BILIRUB SERPL-MCNC: 0.8 MG/DL (ref 0–1.2)
BUN SERPL-MCNC: 15 MG/DL (ref 8–23)
BUN/CREAT SERPL: 14 (ref 7–25)
CALCIUM SPEC-SCNC: 9.7 MG/DL (ref 8.6–10.5)
CHLORIDE SERPL-SCNC: 103 MMOL/L (ref 98–107)
CHOLEST SERPL-MCNC: 137 MG/DL (ref 0–200)
CO2 SERPL-SCNC: 24.8 MMOL/L (ref 22–29)
CREAT SERPL-MCNC: 1.07 MG/DL (ref 0.76–1.27)
EGFRCR SERPLBLD CKD-EPI 2021: 76.1 ML/MIN/1.73
GLOBULIN UR ELPH-MCNC: 2.6 GM/DL
GLUCOSE SERPL-MCNC: 239 MG/DL (ref 65–99)
HDLC SERPL-MCNC: 27 MG/DL (ref 40–60)
LDLC SERPL CALC-MCNC: 80 MG/DL (ref 0–100)
LDLC/HDLC SERPL: 2.8 {RATIO}
POTASSIUM SERPL-SCNC: 4.5 MMOL/L (ref 3.5–5.2)
PROT SERPL-MCNC: 6.9 G/DL (ref 6–8.5)
SODIUM SERPL-SCNC: 141 MMOL/L (ref 136–145)
TRIGL SERPL-MCNC: 172 MG/DL (ref 0–150)
VLDLC SERPL-MCNC: 30 MG/DL (ref 5–40)

## 2023-05-03 PROCEDURE — 36415 COLL VENOUS BLD VENIPUNCTURE: CPT

## 2023-05-03 PROCEDURE — 80061 LIPID PANEL: CPT

## 2023-05-03 PROCEDURE — 80053 COMPREHEN METABOLIC PANEL: CPT

## 2023-06-13 ENCOUNTER — OFFICE VISIT (OUTPATIENT)
Dept: ORTHOPEDIC SURGERY | Facility: CLINIC | Age: 68
End: 2023-06-13
Payer: MEDICARE

## 2023-06-13 VITALS
BODY MASS INDEX: 37.89 KG/M2 | WEIGHT: 250 LBS | OXYGEN SATURATION: 94 % | HEIGHT: 68 IN | DIASTOLIC BLOOD PRESSURE: 83 MMHG | SYSTOLIC BLOOD PRESSURE: 136 MMHG | HEART RATE: 64 BPM

## 2023-06-13 DIAGNOSIS — Z98.890 HISTORY OF REPAIR OF LEFT ROTATOR CUFF: Primary | ICD-10-CM

## 2023-06-13 NOTE — PROGRESS NOTES
"Chief Complaint  Pain and Initial Evaluation of the Left Shoulder     Subjective      Ricky W Sturgeon presents to [facility] for initial evaluation of the left shoulder. He has had pain for 3 weeks.  He was driving his tractor and was shifting the gear forward and heard a pop.  In 2006 he had a shoulder scope.  He after that could not even lift his shoulder.  He had a shoulder injection at the  and he is back to normal.        Allergies   Allergen Reactions   • Sulfa Antibiotics Angioedema        Social History     Socioeconomic History   • Marital status:    Tobacco Use   • Smoking status: Never   • Smokeless tobacco: Never   Vaping Use   • Vaping Use: Never used   Substance and Sexual Activity   • Alcohol use: Not Currently   • Drug use: Never   • Sexual activity: Defer        Review of Systems     Objective   Vital Signs:   /83   Pulse 64   Ht 172.7 cm (68\")   Wt 113 kg (250 lb)   SpO2 94%   BMI 38.01 kg/m²       Physical Exam  Constitutional:       Appearance: Normal appearance. Patient is well-developed and normal weight.   HENT:      Head: Normocephalic.      Right Ear: Hearing and external ear normal.      Left Ear: Hearing and external ear normal.      Nose: Nose normal.   Eyes:      Conjunctiva/sclera: Conjunctivae normal.   Cardiovascular:      Rate and Rhythm: Normal rate.   Pulmonary:      Effort: Pulmonary effort is normal.      Breath sounds: No wheezing or rales.   Abdominal:      Palpations: Abdomen is soft.      Tenderness: There is no abdominal tenderness.   Musculoskeletal:      Cervical back: Normal range of motion.   Skin:     Findings: No rash.   Neurological:      Mental Status: Patient is alert and oriented to person, place, and time.   Psychiatric:         Mood and Affect: Mood and affect normal.         Judgment: Judgment normal.       Ortho Exam      LEFT SHOULDER Forward flexion 170. Abduction 170. External rotation 60. Internal rotation " L5. Negative Cross body adduction. Supraspinatus strength 5/5. Infraspinatus Strength 5/5. Infrared subscap 5/5. Negative Rivera. Negative Neer. Negative Apprehension. Negative Lift off. (Negative Obriens. Sensation intact to light touch, median, radial, ulnar nerve. Positive AIN, PIN, ulnar nerve motor. Positive pulses. Negative Impingement signs. Good strength in triceps, biceps, deltoid, wrist extensors and wrist flexors        Procedures      Imaging Results (Most Recent)       None             [facility] Review :     [facility]   No acute osseous findings.      Assessment and Plan     Diagnoses and all orders for this visit:    1. History of repair of left rotator cuff (Primary)        Discussed the treatment plan with the patient. I reviewed the X-rays that were obtained 5/28/23 with the patient.     HEP exercises.     Call or return if worsening symptoms.    Follow Up     PRN      Patient was given instructions and counseling regarding his condition or for health maintenance advice. Please see specific information pulled into the AVS if appropriate.     Scribed for Rita Calix MD by Laura Huddleston MA.  06/13/23   08:11 EDT    I have personally performed the services described in this document as scribed by the above individual and it is both accurate and complete. Rita Calix MD 06/13/23

## 2023-11-20 ENCOUNTER — LAB (OUTPATIENT)
Dept: LAB | Facility: HOSPITAL | Age: 68
End: 2023-11-20
Payer: MEDICARE

## 2023-11-20 ENCOUNTER — TRANSCRIBE ORDERS (OUTPATIENT)
Dept: CARDIOLOGY | Facility: HOSPITAL | Age: 68
End: 2023-11-20
Payer: MEDICARE

## 2023-11-20 DIAGNOSIS — R07.9 CHEST PAIN, UNSPECIFIED TYPE: ICD-10-CM

## 2023-11-20 DIAGNOSIS — R07.9 CHEST PAIN, UNSPECIFIED TYPE: Primary | ICD-10-CM

## 2023-11-20 LAB
ALBUMIN SERPL-MCNC: 4.7 G/DL (ref 3.5–5.2)
ALBUMIN/GLOB SERPL: 1.8 G/DL
ALP SERPL-CCNC: 78 U/L (ref 39–117)
ALT SERPL W P-5'-P-CCNC: 28 U/L (ref 1–41)
ANION GAP SERPL CALCULATED.3IONS-SCNC: 8.2 MMOL/L (ref 5–15)
AST SERPL-CCNC: 24 U/L (ref 1–40)
BILIRUB SERPL-MCNC: 0.9 MG/DL (ref 0–1.2)
BUN SERPL-MCNC: 16 MG/DL (ref 8–23)
BUN/CREAT SERPL: 14.4 (ref 7–25)
CALCIUM SPEC-SCNC: 9.3 MG/DL (ref 8.6–10.5)
CHLORIDE SERPL-SCNC: 103 MMOL/L (ref 98–107)
CHOLEST SERPL-MCNC: 156 MG/DL (ref 0–200)
CO2 SERPL-SCNC: 28.8 MMOL/L (ref 22–29)
CREAT SERPL-MCNC: 1.11 MG/DL (ref 0.76–1.27)
EGFRCR SERPLBLD CKD-EPI 2021: 72.8 ML/MIN/1.73
GLOBULIN UR ELPH-MCNC: 2.6 GM/DL
GLUCOSE SERPL-MCNC: 158 MG/DL (ref 65–99)
HDLC SERPL-MCNC: 35 MG/DL (ref 40–60)
LDLC SERPL CALC-MCNC: 92 MG/DL (ref 0–100)
LDLC/HDLC SERPL: 2.5 {RATIO}
POTASSIUM SERPL-SCNC: 4.6 MMOL/L (ref 3.5–5.2)
PROT SERPL-MCNC: 7.3 G/DL (ref 6–8.5)
SODIUM SERPL-SCNC: 140 MMOL/L (ref 136–145)
TRIGL SERPL-MCNC: 167 MG/DL (ref 0–150)
VLDLC SERPL-MCNC: 29 MG/DL (ref 5–40)

## 2023-11-20 PROCEDURE — 80061 LIPID PANEL: CPT

## 2023-11-20 PROCEDURE — 80053 COMPREHEN METABOLIC PANEL: CPT

## 2023-11-20 PROCEDURE — 36415 COLL VENOUS BLD VENIPUNCTURE: CPT

## 2024-04-23 ENCOUNTER — CLINICAL SUPPORT NO REQUIREMENTS (OUTPATIENT)
Dept: CARDIOLOGY | Facility: CLINIC | Age: 69
End: 2024-04-23
Payer: MEDICARE

## 2024-04-23 ENCOUNTER — OFFICE VISIT (OUTPATIENT)
Dept: CARDIOLOGY | Facility: CLINIC | Age: 69
End: 2024-04-23
Payer: MEDICARE

## 2024-04-23 ENCOUNTER — DOCUMENTATION (OUTPATIENT)
Dept: CARDIOLOGY | Facility: CLINIC | Age: 69
End: 2024-04-23
Payer: MEDICARE

## 2024-04-23 VITALS
SYSTOLIC BLOOD PRESSURE: 130 MMHG | WEIGHT: 244 LBS | DIASTOLIC BLOOD PRESSURE: 87 MMHG | HEART RATE: 64 BPM | BODY MASS INDEX: 36.98 KG/M2 | HEIGHT: 68 IN

## 2024-04-23 DIAGNOSIS — I25.10 CORONARY ARTERY DISEASE INVOLVING NATIVE CORONARY ARTERY OF NATIVE HEART WITHOUT ANGINA PECTORIS: Primary | ICD-10-CM

## 2024-04-23 DIAGNOSIS — I49.5 SSS (SICK SINUS SYNDROME): ICD-10-CM

## 2024-04-23 DIAGNOSIS — Z95.0 CARDIAC PACEMAKER: Primary | ICD-10-CM

## 2024-04-23 DIAGNOSIS — I10 ESSENTIAL HYPERTENSION: ICD-10-CM

## 2024-04-23 DIAGNOSIS — E78.2 MIXED HYPERLIPIDEMIA: ICD-10-CM

## 2024-04-23 PROCEDURE — 93280 PM DEVICE PROGR EVAL DUAL: CPT | Performed by: INTERNAL MEDICINE

## 2024-04-23 NOTE — PROGRESS NOTES
Normal Dual Chamber Pacemaker Device Interrogation and Device Testing.  Normal evaluation of device function and lead measurements.  No optimization was needed of parameters or maximization of device longevity.  Patient is being ordered and I gave a patient an adapter.

## 2024-04-23 NOTE — PROGRESS NOTES
The patient have past medical history of severe coronary artery disease  CARDIOLOGY INITIAL CONSULT       Chief Complaint  Heart Problem and Fatigue (3 weeks)    Subjective            Ricky W Sturgeon presents to [facility]  History of Present Illness :The patient has past medical history of severe coronary artery disease and previous myocardial infarction in 2019 at that time he had a cardiac catheterization which showed severe marginal stenosis about 90% which was treated with PCI drug-eluting stent.  He has residual 60% stenosis on the mid to distal LAD, mild disease in the right coronary artery.  It was described a 90% stenosis of the septal branch of the LAD and some residual stenosis about 50% of the distal circumflex artery.  His LV function is preserved with a EF of 60% by last 2D echo.  He had a normal nuclear stress test in 2022.  The patient have obstructive sleep apnea on CPAP he is still on dual antiplatelet therapy since 2019.  The patient reports intermittent palpitations.  He had a permanent pacemaker placed before apparently due to bradycardia.  Interrogation of the pacemaker showed normal function and some episodes of sinus tachycardia.  He denies angina or exertional dyspnea.  His latest lipid profile showed an LDL of 92 with a triglycerides of 156 and HDL of 35.          Past History:    Medical History:  Past Medical History:   Diagnosis Date    Allergic rhinitis     Arthritis     Bulging lumbar disc     Chest pain     Chronic back pain     Congestive heart failure (CHF)     DM (diabetes mellitus)     type 2    Dysphagia     Elevated cholesterol     Follow-up examination following surgery 10/09/2014    Gastroesophageal reflux disease     GERD (gastroesophageal reflux disease)     Heart attack     7/2019    Hemorrhoids, external     High blood pressure     High cholesterol     Lumbago     LOW BACK PAIN    Mood disorder     Prostate disorder     Shortness of breath      Sleep apnea     TIA (transient ischemic attack)     unable to do MRI to confirm       Surgical History: has a past surgical history that includes Colonoscopy; Esophagogastroduodenoscopy; Heel spur surgery (Bilateral); Inguinal hernia repair (Left); Shoulder surgery (Bilateral); Pacemaker Implantation; Coronary angioplasty with stent; Esophagogastroduodenoscopy (N/A, 7/28/2021); and Colonoscopy (N/A, 7/28/2021).     Family History: family history includes Cancer in an other family member; Colon cancer in his father and mother; Diabetes in his mother; Esophageal cancer in his brother; Stroke in an other family member.     Social History: reports that he has never smoked. He has never used smokeless tobacco. He reports that he does not currently use alcohol. He reports that he does not use drugs.    Allergies: Sulfa antibiotics    Current Outpatient Medications on File Prior to Visit   Medication Sig    ASPIRIN 81 PO Take 81 mg by mouth Daily.    atorvastatin (LIPITOR) 40 MG tablet Take 1 tablet by mouth Daily.    clopidogrel (PLAVIX) 75 MG tablet Take 1 tablet by mouth.    escitalopram (LEXAPRO) 20 MG tablet Take 1 tablet by mouth Daily.    furosemide (LASIX) 40 MG tablet Take 1 tablet by mouth 2 (Two) Times a Day.    HYDROcodone-acetaminophen (NORCO) 5-325 MG per tablet Take 1 tablet by mouth 2 (Two) Times a Day.    losartan (COZAAR) 100 MG tablet Take 1 tablet by mouth Daily.    metFORMIN ER (GLUCOPHAGE-XR) 500 MG 24 hr tablet Take 2 tablets by mouth Daily.    metoprolol succinate XL (TOPROL-XL) 25 MG 24 hr tablet Take 1 tablet by mouth Daily.    pantoprazole (PROTONIX) 40 MG EC tablet Take 1 tablet by mouth Daily.    albuterol sulfate  (90 Base) MCG/ACT inhaler Inhale 2 puffs Every 6 (Six) Hours As Needed for Wheezing. (Patient not taking: Reported on 4/23/2024)    Dulaglutide (Trulicity) 0.75 MG/0.5ML solution pen-injector Inject 0.75 mg under the skin into the appropriate area as directed 1 (One) Time Per  "Week. Sunday (Patient not taking: Reported on 6/13/2023)    linaclotide (LINZESS) 72 MCG capsule capsule Take 72 mcg by mouth Every Morning Before Breakfast. (Patient not taking: Reported on 6/13/2023)    ondansetron ODT (ZOFRAN-ODT) 4 MG disintegrating tablet Place 1 tablet on the tongue 4 (Four) Times a Day As Needed for Nausea or Vomiting. (Patient not taking: Reported on 6/13/2023)     No current facility-administered medications on file prior to visit.          Review of Systems all systems were reviewed and negative except for palpitations:    Objective     /87 (BP Location: Left arm)   Pulse 64   Ht 172.7 cm (68\")   Wt 111 kg (244 lb)   BMI 37.10 kg/m²       Physical Exam  Alert,oriented  Neck. No jvd, no bruits.  Heart. Regular, no gallops, no murmurs  Lungs. Diminished breath sounds  Abdomen: soft, bs+  LE no edema  Neurologic. No apparent motor deficits.    Result Review :     The following data was reviewed by: Minor Kaplan MD on 04/23/2024:    CMP          5/3/2023    09:50 11/20/2023    08:43   CMP   Glucose 239  158    BUN 15  16    Creatinine 1.07  1.11    EGFR 76.1  72.8    Sodium 141  140    Potassium 4.5  4.6    Chloride 103  103    Calcium 9.7  9.3    Total Protein 6.9  7.3    Albumin 4.3  4.7    Globulin 2.6  2.6    Total Bilirubin 0.8  0.9    Alkaline Phosphatase 84  78    AST (SGOT) 23  24    ALT (SGPT) 38  28    Albumin/Globulin Ratio 1.7  1.8    BUN/Creatinine Ratio 14.0  14.4    Anion Gap 13.2  8.2          Lipid Panel          5/3/2023    09:50 11/20/2023    08:43   Lipid Panel   Total Cholesterol 137  156    Triglycerides 172  167    HDL Cholesterol 27  35    VLDL Cholesterol 30  29    LDL Cholesterol  80  92    LDL/HDL Ratio 2.80  2.50         Data reviewed: Cardiology studies                     Assessment and Plan        Diagnoses and all orders for this visit:    1. Coronary artery disease involving native coronary artery of native heart without angina pectoris " (Primary)    2. Essential hypertension    3. Mixed hyperlipidemia  -     Lipid Panel; Future        The patient is cardiac wise stable.  I will continue with optimal guideline directed medical therapy.  I will adjust his medical regimen.  I would increase the atorvastatin to 80 mg oral at night for goal LDL less than 70 mg/dL.  Will repeat the lipid profile in 6 weeks.  I would also increase the metoprolol XL to 50 mg daily for better heart rate control.  Advised to continue with Plavix 75 mg oral daily and discontinue aspirin.  The patient have previous episodes of weakness and was evaluated by neurology and based on all neurological testing he never have a TIA or stroke.  The patient never have stent thrombosis or any indication for continuation of dual antiplatelet therapy beyond 1 year after PCI.  Advised to continue with lifestyle modification.  Advised to exercise at least walk 45 minutes/day 5 times per week.  Instructed to lose weight for goal BMI less than 28.    I spent 75 minutes caring for Harvey on this date of service. This time includes time spent by me in the following activities:reviewing tests, obtaining and/or reviewing a separately obtained history, performing a medically appropriate examination and/or evaluation , ordering medications, tests, or procedures, and documenting information in the medical record.    Follow Up     Return in about 4 months (around 8/23/2024).    Patient was given instructions and counseling regarding his condition or for health maintenance advice. Please see specific information pulled into the AVS if appropriate.

## 2024-05-14 ENCOUNTER — TELEPHONE (OUTPATIENT)
Dept: CARDIOLOGY | Facility: CLINIC | Age: 69
End: 2024-05-14
Payer: MEDICARE

## 2024-05-14 NOTE — TELEPHONE ENCOUNTER
[facility] CALLED AND STATED PT SAID HE WAS SUPPOSED TO HAVE NEW SCRIPTS SENT FOR HIS METOPROLOL AND ATORVASTATIN WITH NEW INCREASED DOSES.  PHARMACY HAS NOT RECEIVED THOSE.  PHARMACY ALSO ADVISED PT IS OUT OF CLOPIDOGREL AND FUROSEMIDE.

## 2024-05-15 RX ORDER — FUROSEMIDE 40 MG/1
40 TABLET ORAL 2 TIMES DAILY
Qty: 180 TABLET | Refills: 3 | Status: SHIPPED | OUTPATIENT
Start: 2024-05-15

## 2024-05-15 RX ORDER — CLOPIDOGREL BISULFATE 75 MG/1
75 TABLET ORAL DAILY
Qty: 90 TABLET | Refills: 3 | Status: SHIPPED | OUTPATIENT
Start: 2024-05-15

## 2024-05-15 RX ORDER — ATORVASTATIN CALCIUM 80 MG/1
80 TABLET, FILM COATED ORAL NIGHTLY
Qty: 90 TABLET | Refills: 3 | Status: SHIPPED | OUTPATIENT
Start: 2024-05-15

## 2024-05-15 RX ORDER — METOPROLOL SUCCINATE 50 MG/1
50 TABLET, EXTENDED RELEASE ORAL DAILY
Qty: 90 TABLET | Refills: 3 | Status: SHIPPED | OUTPATIENT
Start: 2024-05-15

## 2024-05-15 NOTE — TELEPHONE ENCOUNTER
I have sent updated prescriptions, dose of metoprolol was increased to 50 mg daily, and atorvastatin was increased to 80 mg at night per recent office note.  I have sent maintenance refills of clopidogrel and furosemide.

## 2024-06-25 ENCOUNTER — LAB (OUTPATIENT)
Dept: LAB | Facility: HOSPITAL | Age: 69
End: 2024-06-25
Payer: MEDICARE

## 2024-06-25 DIAGNOSIS — E78.2 MIXED HYPERLIPIDEMIA: ICD-10-CM

## 2024-06-25 LAB
CHOLEST SERPL-MCNC: 88 MG/DL (ref 0–200)
HDLC SERPL-MCNC: 28 MG/DL (ref 40–60)
LDLC SERPL CALC-MCNC: 40 MG/DL (ref 0–100)
LDLC/HDLC SERPL: 1.39 {RATIO}
TRIGL SERPL-MCNC: 106 MG/DL (ref 0–150)
VLDLC SERPL-MCNC: 20 MG/DL (ref 5–40)

## 2024-06-25 PROCEDURE — 36415 COLL VENOUS BLD VENIPUNCTURE: CPT

## 2024-06-25 PROCEDURE — 80061 LIPID PANEL: CPT

## 2024-07-23 ENCOUNTER — READMISSION MANAGEMENT (OUTPATIENT)
Dept: CALL CENTER | Facility: HOSPITAL | Age: 69
End: 2024-07-23
Payer: MEDICARE

## 2024-07-23 ENCOUNTER — HOSPITAL ENCOUNTER (OUTPATIENT)
Facility: HOSPITAL | Age: 69
Setting detail: OBSERVATION
Discharge: HOME | End: 2024-07-23
Attending: STUDENT IN AN ORGANIZED HEALTH CARE EDUCATION/TRAINING PROGRAM | Admitting: STUDENT IN AN ORGANIZED HEALTH CARE EDUCATION/TRAINING PROGRAM
Payer: MEDICARE

## 2024-07-23 VITALS
SYSTOLIC BLOOD PRESSURE: 117 MMHG | HEIGHT: 67 IN | BODY MASS INDEX: 36.92 KG/M2 | HEART RATE: 60 BPM | WEIGHT: 235.23 LBS | TEMPERATURE: 97.7 F | DIASTOLIC BLOOD PRESSURE: 66 MMHG | OXYGEN SATURATION: 96 % | RESPIRATION RATE: 20 BRPM

## 2024-07-23 PROBLEM — T82.111A PACEMAKER MALFUNCTION: Status: ACTIVE | Noted: 2024-07-23

## 2024-07-23 PROBLEM — R07.89 CHEST PAIN, ATYPICAL: Status: ACTIVE | Noted: 2024-07-23

## 2024-07-23 LAB
ALBUMIN SERPL-MCNC: 4.6 G/DL (ref 3.5–5.2)
ALBUMIN/GLOB SERPL: 1.9 G/DL
ALP SERPL-CCNC: 85 U/L (ref 39–117)
ALT SERPL W P-5'-P-CCNC: 29 U/L (ref 1–41)
ANION GAP SERPL CALCULATED.3IONS-SCNC: 8.1 MMOL/L (ref 5–15)
AST SERPL-CCNC: 22 U/L (ref 1–40)
BASOPHILS # BLD AUTO: 0.07 10*3/MM3 (ref 0–0.2)
BASOPHILS NFR BLD AUTO: 0.8 % (ref 0–1.5)
BILIRUB SERPL-MCNC: 1.2 MG/DL (ref 0–1.2)
BUN SERPL-MCNC: 24 MG/DL (ref 8–23)
BUN/CREAT SERPL: 22.9 (ref 7–25)
CALCIUM SPEC-SCNC: 9.5 MG/DL (ref 8.6–10.5)
CHLORIDE SERPL-SCNC: 104 MMOL/L (ref 98–107)
CO2 SERPL-SCNC: 27.9 MMOL/L (ref 22–29)
CREAT SERPL-MCNC: 1.05 MG/DL (ref 0.76–1.27)
DEPRECATED RDW RBC AUTO: 45.6 FL (ref 37–54)
EGFRCR SERPLBLD CKD-EPI 2021: 77.3 ML/MIN/1.73
EOSINOPHIL # BLD AUTO: 0.19 10*3/MM3 (ref 0–0.4)
EOSINOPHIL NFR BLD AUTO: 2.2 % (ref 0.3–6.2)
ERYTHROCYTE [DISTWIDTH] IN BLOOD BY AUTOMATED COUNT: 12.8 % (ref 12.3–15.4)
GLOBULIN UR ELPH-MCNC: 2.4 GM/DL
GLUCOSE SERPL-MCNC: 90 MG/DL (ref 65–99)
HCT VFR BLD AUTO: 41.8 % (ref 37.5–51)
HGB BLD-MCNC: 13.9 G/DL (ref 13–17.7)
IMM GRANULOCYTES # BLD AUTO: 0.02 10*3/MM3 (ref 0–0.05)
IMM GRANULOCYTES NFR BLD AUTO: 0.2 % (ref 0–0.5)
LYMPHOCYTES # BLD AUTO: 3.31 10*3/MM3 (ref 0.7–3.1)
LYMPHOCYTES NFR BLD AUTO: 38.1 % (ref 19.6–45.3)
MAGNESIUM SERPL-MCNC: 2.6 MG/DL (ref 1.6–2.4)
MCH RBC QN AUTO: 32.3 PG (ref 26.6–33)
MCHC RBC AUTO-ENTMCNC: 33.3 G/DL (ref 31.5–35.7)
MCV RBC AUTO: 97.2 FL (ref 79–97)
MONOCYTES # BLD AUTO: 0.89 10*3/MM3 (ref 0.1–0.9)
MONOCYTES NFR BLD AUTO: 10.3 % (ref 5–12)
NEUTROPHILS NFR BLD AUTO: 4.2 10*3/MM3 (ref 1.7–7)
NEUTROPHILS NFR BLD AUTO: 48.4 % (ref 42.7–76)
NRBC BLD AUTO-RTO: 0 /100 WBC (ref 0–0.2)
PLATELET # BLD AUTO: 265 10*3/MM3 (ref 140–450)
PMV BLD AUTO: 9.8 FL (ref 6–12)
POTASSIUM SERPL-SCNC: 4.1 MMOL/L (ref 3.5–5.2)
PROT SERPL-MCNC: 7 G/DL (ref 6–8.5)
QT INTERVAL: 450 MS
QTC INTERVAL: 450 MS
RBC # BLD AUTO: 4.3 10*6/MM3 (ref 4.14–5.8)
SODIUM SERPL-SCNC: 140 MMOL/L (ref 136–145)
WBC NRBC COR # BLD AUTO: 8.68 10*3/MM3 (ref 3.4–10.8)

## 2024-07-23 PROCEDURE — 99222 1ST HOSP IP/OBS MODERATE 55: CPT | Performed by: STUDENT IN AN ORGANIZED HEALTH CARE EDUCATION/TRAINING PROGRAM

## 2024-07-23 PROCEDURE — 25810000003 SODIUM CHLORIDE 0.9 % SOLUTION: Performed by: STUDENT IN AN ORGANIZED HEALTH CARE EDUCATION/TRAINING PROGRAM

## 2024-07-23 PROCEDURE — G0379 DIRECT REFER HOSPITAL OBSERV: HCPCS

## 2024-07-23 PROCEDURE — 80053 COMPREHEN METABOLIC PANEL: CPT | Performed by: STUDENT IN AN ORGANIZED HEALTH CARE EDUCATION/TRAINING PROGRAM

## 2024-07-23 PROCEDURE — 85025 COMPLETE CBC W/AUTO DIFF WBC: CPT | Performed by: STUDENT IN AN ORGANIZED HEALTH CARE EDUCATION/TRAINING PROGRAM

## 2024-07-23 PROCEDURE — 99238 HOSP IP/OBS DSCHRG MGMT 30/<: CPT | Performed by: INTERNAL MEDICINE

## 2024-07-23 PROCEDURE — 83735 ASSAY OF MAGNESIUM: CPT | Performed by: STUDENT IN AN ORGANIZED HEALTH CARE EDUCATION/TRAINING PROGRAM

## 2024-07-23 PROCEDURE — 96361 HYDRATE IV INFUSION ADD-ON: CPT

## 2024-07-23 PROCEDURE — 96360 HYDRATION IV INFUSION INIT: CPT

## 2024-07-23 PROCEDURE — G0378 HOSPITAL OBSERVATION PER HR: HCPCS

## 2024-07-23 PROCEDURE — 93005 ELECTROCARDIOGRAM TRACING: CPT | Performed by: STUDENT IN AN ORGANIZED HEALTH CARE EDUCATION/TRAINING PROGRAM

## 2024-07-23 PROCEDURE — 99214 OFFICE O/P EST MOD 30 MIN: CPT | Performed by: INTERNAL MEDICINE

## 2024-07-23 RX ORDER — ALUMINA, MAGNESIA, AND SIMETHICONE 2400; 2400; 240 MG/30ML; MG/30ML; MG/30ML
15 SUSPENSION ORAL EVERY 6 HOURS PRN
Status: DISCONTINUED | OUTPATIENT
Start: 2024-07-23 | End: 2024-07-23 | Stop reason: HOSPADM

## 2024-07-23 RX ORDER — AMOXICILLIN 250 MG
2 CAPSULE ORAL 2 TIMES DAILY PRN
Status: DISCONTINUED | OUTPATIENT
Start: 2024-07-23 | End: 2024-07-23 | Stop reason: HOSPADM

## 2024-07-23 RX ORDER — TAMSULOSIN HYDROCHLORIDE 0.4 MG/1
1 CAPSULE ORAL DAILY
COMMUNITY
Start: 2024-05-17

## 2024-07-23 RX ORDER — BISACODYL 10 MG
10 SUPPOSITORY, RECTAL RECTAL DAILY PRN
Status: DISCONTINUED | OUTPATIENT
Start: 2024-07-23 | End: 2024-07-23 | Stop reason: HOSPADM

## 2024-07-23 RX ORDER — SODIUM CHLORIDE 0.9 % (FLUSH) 0.9 %
10 SYRINGE (ML) INJECTION AS NEEDED
Status: DISCONTINUED | OUTPATIENT
Start: 2024-07-23 | End: 2024-07-23 | Stop reason: HOSPADM

## 2024-07-23 RX ORDER — SODIUM CHLORIDE 0.9 % (FLUSH) 0.9 %
10 SYRINGE (ML) INJECTION EVERY 12 HOURS SCHEDULED
Status: DISCONTINUED | OUTPATIENT
Start: 2024-07-23 | End: 2024-07-23 | Stop reason: HOSPADM

## 2024-07-23 RX ORDER — SODIUM CHLORIDE 9 MG/ML
75 INJECTION, SOLUTION INTRAVENOUS CONTINUOUS
Status: DISCONTINUED | OUTPATIENT
Start: 2024-07-23 | End: 2024-07-23 | Stop reason: HOSPADM

## 2024-07-23 RX ORDER — POLYETHYLENE GLYCOL 3350 17 G/17G
17 POWDER, FOR SOLUTION ORAL DAILY PRN
Status: DISCONTINUED | OUTPATIENT
Start: 2024-07-23 | End: 2024-07-23 | Stop reason: HOSPADM

## 2024-07-23 RX ORDER — BISACODYL 5 MG/1
5 TABLET, DELAYED RELEASE ORAL DAILY PRN
Status: DISCONTINUED | OUTPATIENT
Start: 2024-07-23 | End: 2024-07-23 | Stop reason: HOSPADM

## 2024-07-23 RX ORDER — NITROGLYCERIN 0.4 MG/1
0.4 TABLET SUBLINGUAL
Status: DISCONTINUED | OUTPATIENT
Start: 2024-07-23 | End: 2024-07-23 | Stop reason: HOSPADM

## 2024-07-23 RX ORDER — SODIUM CHLORIDE 9 MG/ML
40 INJECTION, SOLUTION INTRAVENOUS AS NEEDED
Status: DISCONTINUED | OUTPATIENT
Start: 2024-07-23 | End: 2024-07-23 | Stop reason: HOSPADM

## 2024-07-23 RX ADMIN — Medication 10 ML: at 00:35

## 2024-07-23 RX ADMIN — Medication 10 ML: at 09:18

## 2024-07-23 RX ADMIN — SODIUM CHLORIDE 75 ML/HR: 9 INJECTION, SOLUTION INTRAVENOUS at 09:17

## 2024-07-23 NOTE — PLAN OF CARE
Goal Outcome Evaluation:  Plan of Care Reviewed With: patient        Progress: no change  Outcome Evaluation: Direct admit from different facility. A&Ox4. Patient was working outside 'all day'. Felt three to four sharp pains in chest where pacemaker is implanted. Pain has resolved per patient. No signs or symptoms of distress expressed or observed. Spouse at bedside. Resting in bed with eyes closed and visible respirations. Plan for pacemaker to be interrogated.

## 2024-07-23 NOTE — DISCHARGE INSTR - APPOINTMENTS
Patient needs to follow up with Martha Suresh(PCP) on 7/30/24 @10:15am [phone]  Patient needs to follow up with (Cardiology) on 7/31/24 @9:45 am [phone]

## 2024-07-23 NOTE — DISCHARGE SUMMARY
[facility]  DISCHARGE SUMMARY        Date of Admission: 7/23/2024    Date of Discharge:  7/23/2024    Length of stay:  LOS: 0 days     Presenting Problem:   Pacemaker malfunction [T82.111A]    Hospital Course     Active Diagnosis During Hospital Stay/Discharge Diagnoses/Course by Diagnoses:    Possible pacemaker malfunction:ruled out interrogated and working fine   Atypical chest pain:evaluated by cardiology seems more MSK in nature  Obesity, BMI 36  Hypertension  Hyperlipidemia  Type 2 diabetes  Coronary artery disease status post PCI       Active Hospital Problems    Diagnosis  POA    **Chest pain, atypical [R07.89]  Yes      Resolved Hospital Problems   No resolved problems to display.         Hospital Course  Patient is a 69 y.o. male presented with use as noted above he was admitted pacemaker was interrogated seem to be functioning properly.  Chest pain resolved felt to be musculoskeletal in nature.  He will need to follow-up with his established cardiology as an outpatient.  He was anxious to discharge home and in agreement with plan..        Procedures Performed:as noted          Consults:   Consults       Date and Time Order Name Status Description    7/23/2024  7:56 AM Inpatient Cardiology Consult                Pertinent  and/or Most Recent Results       Pertinent Test Results:     Results from last 7 days   Lab Units 07/23/24  0046   WBC 10*3/mm3 8.68   HEMOGLOBIN g/dL 13.9   HEMATOCRIT % 41.8   PLATELETS 10*3/mm3 265     Results from last 7 days   Lab Units 07/23/24  0046   SODIUM mmol/L 140   POTASSIUM mmol/L 4.1   CHLORIDE mmol/L 104   CO2 mmol/L 27.9   BUN mg/dL 24*   CREATININE mg/dL 1.05   CALCIUM mg/dL 9.5   BILIRUBIN mg/dL 1.2   ALK PHOS U/L 85   ALT (SGPT) U/L 29   AST (SGOT) U/L 22   GLUCOSE mg/dL 90       Microbiology Results (last 10 days)       ** No results found for the last 240 hours. **                Imaging Results (All)       None              Day of  Discharge       Condition on Discharge:  stable     Vital Signs  Temp:  [97.3 °F (36.3 °C)-97.9 °F (36.6 °C)] 97.9 °F (36.6 °C)  Heart Rate:  [60-61] 61  Resp:  [20] 20  BP: (104-125)/(74-81) 104/74    Physical Exam:  Physical Exam  Vitals reviewed.   Cardiovascular:      Rate and Rhythm: Normal rate.   Pulmonary:      Effort: No respiratory distress.   Abdominal:      General: There is no distension.   Skin:     General: Skin is warm.   Neurological:      Mental Status: He is alert and oriented to person, place, and time.               Discharge Disposition  Home or Self Care    Discharge Medications     Discharge Medications        Continue These Medications        Instructions Start Date   atorvastatin 80 MG tablet  Commonly known as: LIPITOR   80 mg, Oral, Nightly      clopidogrel 75 MG tablet  Commonly known as: PLAVIX   75 mg, Oral, Daily      Cyanocobalamin 1000 MCG sublingual tablet   1 tablet, Sublingual, Daily      escitalopram 20 MG tablet  Commonly known as: LEXAPRO   20 mg, Oral, Daily      furosemide 40 MG tablet  Commonly known as: LASIX   40 mg, Oral, 2 Times Daily      HYDROcodone-acetaminophen 5-325 MG per tablet  Commonly known as: NORCO   1 tablet, Oral, 2 Times Daily      metFORMIN  MG 24 hr tablet  Commonly known as: GLUCOPHAGE-XR   500 mg, Oral, 2 Times Daily      metoprolol succinate XL 50 MG 24 hr tablet  Commonly known as: TOPROL-XL   50 mg, Oral, Daily      pantoprazole 40 MG EC tablet  Commonly known as: PROTONIX   40 mg, Oral, Daily      Semaglutide(0.25 or 0.5MG/DOS) 2 MG/1.5ML solution pen-injector  Commonly known as: OZEMPIC   0.25 mg, Subcutaneous, Weekly, Wednesday      tamsulosin 0.4 MG capsule 24 hr capsule  Commonly known as: FLOMAX   1 capsule, Oral, Daily               Discharge Diet:   Diet Instructions       Diet: Cardiac Diets; No Salt Packet; Thin (IDDSI 0)      Discharge Diet: Cardiac Diets    Cardiac Diet: No Salt Packet    Fluid Consistency: Thin (IDDSI 0)             Activity at Discharge:   Activity Instructions       Activity as Tolerated              Follow-up Appointments  Future Appointments   Date Time Provider Department Center   8/27/2024 10:30 AM Minor Valencia MD [facility]     Additional Instructions for the Follow-ups that You Need to Schedule       Discharge Follow-up with PCP   As directed       Currently Documented PCP:    Martha Suresh APRN    PCP Phone Number:    [phone]     Follow Up Details: one week        Discharge Follow-up with Specified Provider: cardiolgy; 1 Week   As directed      To: cardiolgy   Follow Up: 1 Week   Follow Up Details: already has appmt in one week                Test Results Pending at Discharge       Risk for Readmission (LACE) Score: 5 (7/23/2024  6:00 AM)        Time: Discharge 30 min with face-to-face history exam, writing of prescriptions, and documenting discharge data including care coordination with the nursing staff.      Electronically signed by Levon Quintero DO, 07/23/24, 14:41 EDT.      Note disclaimer: At [facility], we believe that sharing information builds trust and better relationships. You are receiving this note because you recently visited [facility]. It is possible you will see health information before a provider has talked with you about it. This kind of information can be easy to misunderstand. To help you fully understand what it means for your health, we urge you to discuss this note with your provider.

## 2024-07-23 NOTE — H&P
[facility] HISTORY AND PHYSICAL  Date: 2024   Patient Name: Ricky W Sturgeon  : 1955  MRN: [id number]  Primary Care Physician:  Martha Suresh APRN  Date of admission: 2024    Subjective   Subjective     Chief Complaint: Felt his pacemaker    HPI:    Ricky W Sturgeon is a 69 y.o. male past medical history of type 2 diabetes, sick sinus syndrome status post Saint Jerald pacemaker, MANDY with unclear CPAP clearance, GERD, chronic back pain, hypertension, hyperlipidemia who presents to offsite ER due to feeling his pacemaker.  Patient states that he was outside ambulating and doing yard work when suddenly he felt for sharp shocklike sensations in his chest with radiation to the arm.  He was concerned that he felt his pacemaker firing and went to a local ER to be evaluated.  Upon arrival there he was hemodynamically stable without any evidence of bradycardia or AV block on EKG.  EKG showed sinus rhythm with left axis deviation rate of 61 without any clear pacemaker spikes.  Due to their inability to evaluate pacemaker functionality they contacted our cardiologist who agreed to see the patient in consult and accepted patient for transfer to our facility    Upon arrival here patient remained stable with a heart rate of 64.  Lab workup is pending at this time.  He denies have any complaints.  Denied any syncopal episode with this episode of shocks.  Denied having any recent illness.  Has been adherent to all his medications without any changes lately.  Lab workup at offsite facility did not show any electrolyte abnormalities or abnormal troponin/BNP.  Chest x-ray was unremarkable as well.      Personal History     Past Medical History:  Past Medical History:   Diagnosis Date    Allergic rhinitis     Arthritis     Bulging lumbar disc     Chest pain     Chronic back pain     Congestive heart failure (CHF)     DM (diabetes mellitus)     type 2    Dysphagia     Elevated cholesterol      Follow-up examination following surgery 10/09/2014    Gastroesophageal reflux disease     GERD (gastroesophageal reflux disease)     Heart attack     7/2019    Hemorrhoids, external     High blood pressure     High cholesterol     Lumbago     LOW BACK PAIN    Mood disorder     Prostate disorder     Shortness of breath     Sleep apnea     TIA (transient ischemic attack)     unable to do MRI to confirm         Past Surgical History:  Past Surgical History:   Procedure Laterality Date    COLONOSCOPY      COLONOSCOPY N/A 7/28/2021    Procedure: COLONOSCOPY WITH POLYPECTOMY;  Surgeon: Jonas Barriga MD;  Location: [facility] ENDOSCOPY;  Service: General;  Laterality: N/A;  DIVERTICULOSIS, COLON POLYPS    CORONARY ANGIOPLASTY WITH STENT PLACEMENT      ENDOSCOPY      ENDOSCOPY N/A 7/28/2021    Procedure: ESOPHAGOGASTRODUODENOSCOPY WITH BIOPSIES;  Surgeon: Jonas Barriga MD;  Location: [facility] ENDOSCOPY;  Service: General;  Laterality: N/A;  GASTRITIS    HEEL SPUR SURGERY Bilateral     HEEL SPURS REMOVED FROM BOTH HEELS    INGUINAL HERNIA REPAIR Left     PACEMAKER IMPLANTATION      SHOULDER SURGERY Bilateral          Family History:   Reviewed and noncontributory except as mentioned in HPI    Social History:   Social Determinants of Health     Tobacco Use: Low Risk  (7/23/2024)    Patient History     Smoking Tobacco Use: Never     Smokeless Tobacco Use: Never     Passive Exposure: Not on file   Alcohol Use: Not At Risk (7/23/2024)    AUDIT-C     Frequency of Alcohol Consumption: Never     Average Number of Drinks: Patient does not drink     Frequency of Binge Drinking: Never   Financial Resource Strain: Not on file   Food Insecurity: Not on file   Transportation Needs: Not on file   Physical Activity: Not on file   Stress: Not on file   Social Connections: Unknown (10/12/2023)    Family and Community Support     Help with Day-to-Day Activities: Not on file     Lonely or Isolated: Not on file   Interpersonal Safety: Not At  Risk (7/23/2024)    Abuse Screen     Unsafe at Home or Work/School: no     Feels Threatened by Someone?: no     Does Anyone Keep You from Contacting Others or Doint Things Outside the Home?: no     Physical Sign of Abuse Present: no   Depression: Not on file   Housing Stability: Unknown (7/23/2024)    Housing Stability     Current Living Arrangements: home     Potentially Unsafe Housing Conditions: Not on file   Utilities: Not on file   Health Literacy: Unknown (10/12/2023)    Education     Help with school or training?: Not on file     Preferred Language: Not on file   Employment: Unknown (10/12/2023)    Employment     Do you want help finding or keeping work or a job?: Not on file   Disabilities: Not At Risk (7/23/2024)    Disabilities     Concentrating, Remembering, or Making Decisions Difficulty: no     Doing Errands Independently Difficulty: no         Home Medications:  Aspirin, Dulaglutide, HYDROcodone-acetaminophen, albuterol sulfate HFA, atorvastatin, clopidogrel, escitalopram, furosemide, linaclotide, losartan, metFORMIN ER, metoprolol succinate XL, ondansetron ODT, and pantoprazole    Allergies:  Allergies   Allergen Reactions    Sulfa Antibiotics Angioedema       Review of Systems   All systems were reviewed and negative except for: Chest pain    Objective   Objective     Vitals:   Temp:  [97.7 °F (36.5 °C)] 97.7 °F (36.5 °C)  Resp:  [20] 20  BP: (108)/(81) 108/81    Physical Exam    Constitutional: Awake, alert, no acute distress   Eyes: Pupils equal, sclerae anicteric, no conjunctival injection   HENT: NCAT, mucous membranes moist   Neck: Supple, no thyromegaly, no lymphadenopathy, trachea midline   Respiratory: Clear to auscultation bilaterally, nonlabored respirations    Cardiovascular: RRR, no murmurs, rubs, or gallops, palpable pedal pulses bilaterally   Gastrointestinal: Positive bowel sounds, soft, nontender, nondistended   Musculoskeletal: No bilateral ankle edema, no clubbing or cyanosis to  "extremities   Psychiatric: Appropriate affect, cooperative   Neurologic: Oriented x 3, strength symmetric in all extremities, Cranial Nerves grossly intact to confrontation, speech clear   Skin: No rashes     Result Review    Result Review:  I have personally reviewed the results from the time of this admission to 7/23/2024 00:54 EDT and agree with these findings:  [x]  Laboratory  [x]  Microbiology  [x]  Radiology  [x]  EKG/Telemetry   [x]  Cardiology/Vascular   []  Pathology  [x]  Old records  []  Other:      Assessment & Plan   Assessment / Plan     Assessment/Plan:   Possible pacemaker malfunction  Atypical chest pain  Obesity, BMI 36  Hypertension  Hyperlipidemia  Type 2 diabetes  Coronary artery disease status post PCI    Plan  - Admit to hospitalist service for observation  - Cardiology consult to evaluate pacemaker.  We have ordered interrogation of his [employer] device as well, no records suggest that this is ICD and it is highly unlikely that it \"fired\" however we will see findings of interrogation  - Repeat EKG ordered.  Repeat troponin ordered  - Continue home antihypertensives once we confirm dose  - Insulin sliding scale and Accu-Cheks for type 2 diabetes  - Continue beta-blocker, Plavix and statin for known CAD      Discussed with ER Physician and Nurse    All labs/imaging studies were personally reviewed and findings are as noted above      DVT Prophylaxis: Heparin    CODE STATUS:    Code Status (Patient has no pulse and is not breathing): CPR (Attempt to Resuscitate)  Medical Interventions (Patient has pulse or is breathing): Full Support      Admission Status:  I believe this patient meets observation status.    Electronically signed by Gregorio Monk MD, 07/23/24, 12:54 AM EDT.             "

## 2024-07-23 NOTE — CONSULTS
Cardiology Consult Note  [facility] C[facility]          Patient Identification:  Ricky W Sturgeon      [id number]  69 y.o.        male  1955           Reason for Consultation: Pacemaker shocking    PCP: Martha Suresh APRN    History of Present Illness:     Patient is a 69-year-old with a prior history of diabetes type 2, hyperlipidemia, essential hypertension, TIA, CAD, and dual-chamber pacemaker for sick sinus syndrome who presented due to 3 episodes of a brief shocking discomfort in his chest just lasting for seconds since then the patient has not had any further discomfort or problems.  He was seen in outside emergency room and normal sinus rhythm rate in the 60s sent over here to interrogate his pacemaker.  He had no events overnight resting comfort this morning denies any breathing problems.  No fever chills or cough    Past History:  Past Medical History:   Diagnosis Date    Allergic rhinitis     Arthritis     Bulging lumbar disc     Chest pain     Chronic back pain     Congestive heart failure (CHF)     DM (diabetes mellitus)     type 2    Dysphagia     Elevated cholesterol     Follow-up examination following surgery 10/09/2014    Gastroesophageal reflux disease     GERD (gastroesophageal reflux disease)     Heart attack     7/2019    Hemorrhoids, external     High blood pressure     High cholesterol     Lumbago     LOW BACK PAIN    Mood disorder     Prostate disorder     Shortness of breath     Sleep apnea     TIA (transient ischemic attack)     unable to do MRI to confirm     Past Surgical History:   Procedure Laterality Date    COLONOSCOPY      COLONOSCOPY N/A 7/28/2021    Procedure: COLONOSCOPY WITH POLYPECTOMY;  Surgeon: Jonas Barriga MD;  Location: [facility] ENDOSCOPY;  Service: General;  Laterality: N/A;  DIVERTICULOSIS, COLON POLYPS    CORONARY ANGIOPLASTY WITH STENT PLACEMENT      ENDOSCOPY      ENDOSCOPY N/A 7/28/2021    Procedure: ESOPHAGOGASTRODUODENOSCOPY WITH  BIOPSIES;  Surgeon: Jonas Barriga MD;  Location: [facility] ENDOSCOPY;  Service: General;  Laterality: N/A;  GASTRITIS    HEEL SPUR SURGERY Bilateral     HEEL SPURS REMOVED FROM BOTH HEELS    INGUINAL HERNIA REPAIR Left     PACEMAKER IMPLANTATION      SHOULDER SURGERY Bilateral      Allergies   Allergen Reactions    Sulfa Antibiotics Angioedema     Social History     Socioeconomic History    Marital status:    Tobacco Use    Smoking status: Never    Smokeless tobacco: Never   Vaping Use    Vaping status: Never Used   Substance and Sexual Activity    Alcohol use: Not Currently    Drug use: Never    Sexual activity: Defer     Family History   Problem Relation Age of Onset    Colon cancer Mother         70S    Diabetes Mother     Colon cancer Father         70S    Stroke Other     Cancer Other     Esophageal cancer Brother     Malig Hyperthermia Neg Hx        Medications:  Prior to Admission medications    Medication Sig Start Date End Date Taking? Authorizing Provider   atorvastatin (LIPITOR) 80 MG tablet Take 1 tablet by mouth Every Night. 5/15/24  Yes Franchesca Hurt APRN   clopidogrel (PLAVIX) 75 MG tablet Take 1 tablet by mouth Daily. 5/15/24  Yes Franchesca Hurt APRN   Cyanocobalamin 1000 MCG sublingual tablet Place 1 tablet under the tongue Daily.   Yes ProviderDaniel MD   escitalopram (LEXAPRO) 20 MG tablet Take 1 tablet by mouth Daily.   Yes ProviderDaniel MD   furosemide (LASIX) 40 MG tablet Take 1 tablet by mouth 2 (Two) Times a Day.  Patient taking differently: Take 1 tablet by mouth Daily. 5/15/24  Yes Franchesca Hurt APRN   HYDROcodone-acetaminophen (NORCO) 5-325 MG per tablet Take 1 tablet by mouth 2 (Two) Times a Day.   Yes Daniel Chatman MD   metFORMIN ER (GLUCOPHAGE-XR) 500 MG 24 hr tablet Take 1 tablet by mouth 2 (Two) Times a Day. 6/19/21  Yes Daniel Chatman MD   metoprolol succinate XL (TOPROL-XL) 50 MG 24 hr tablet Take 1 tablet by mouth Daily. 5/15/24  Yes  Franchesca Hurt APRN   pantoprazole (PROTONIX) 40 MG EC tablet Take 1 tablet by mouth Daily. 5/5/21  Yes Daniel Chatman MD   tamsulosin (FLOMAX) 0.4 MG capsule 24 hr capsule Take 1 capsule by mouth Daily. 5/17/24  Yes Daniel Chatman MD   Semaglutide,0.25 or 0.5MG/DOS, (OZEMPIC) 2 MG/1.5ML solution pen-injector Inject 0.25 mg under the skin into the appropriate area as directed 1 (One) Time Per Week. Wednesday    Daniel Chatman MD   albuterol sulfate  (90 Base) MCG/ACT inhaler Inhale 2 puffs Every 6 (Six) Hours As Needed for Wheezing.  Patient not taking: Reported on 4/23/2024 1/15/22 7/23/24  Irina Sanches APRN   aspirin 81 MG EC tablet Take 1 tablet by mouth Daily.  7/23/24  Daniel Chatman MD   Dulaglutide (Trulicity) 0.75 MG/0.5ML solution pen-injector Inject 0.75 mg under the skin into the appropriate area as directed 1 (One) Time Per Week. Sunday  Patient not taking: Reported on 6/13/2023 7/23/24  Daniel Chatman MD   linaclotide (LINZESS) 72 MCG capsule capsule Take 72 mcg by mouth Every Morning Before Breakfast.  Patient not taking: Reported on 6/13/2023 7/23/24  Daniel Chatman MD   losartan (COZAAR) 100 MG tablet Take 1 tablet by mouth Daily.  7/23/24  Daniel Chatman MD   ondansetron ODT (ZOFRAN-ODT) 4 MG disintegrating tablet Place 1 tablet on the tongue 4 (Four) Times a Day As Needed for Nausea or Vomiting.  Patient not taking: Reported on 6/13/2023 1/15/22 7/23/24  Irina Sanches APRN      Current medications:  sodium chloride, 10 mL, Intravenous, Q12H      Current IV drips:  sodium chloride, 75 mL/hr, Last Rate: 75 mL/hr (07/23/24 0917)        Review of Systems   Constitutional: Negative for chills, fever and weight loss.   HENT:  Negative for congestion and nosebleeds.    Cardiovascular:  Positive for chest pain. Negative for orthopnea and paroxysmal nocturnal dyspnea.   Respiratory:  Negative for cough and shortness of breath.    Endocrine:  "Negative for cold intolerance and heat intolerance.   Skin:  Negative for rash.   Musculoskeletal:  Negative for back pain and muscle weakness.   Gastrointestinal:  Negative for abdominal pain, nausea and vomiting.   Genitourinary:  Negative for dysuria and nocturia.   Neurological:  Negative for dizziness and light-headedness.   Psychiatric/Behavioral:  Negative for altered mental status and hallucinations.          Physical Exam    /77 (BP Location: Left arm, Patient Position: Lying)   Pulse 60   Temp 97.7 °F (36.5 °C) (Oral)   Resp 20   Ht 170.2 cm (67\")   Wt 107 kg (235 lb 3.7 oz)   SpO2 97%   BMI 36.84 kg/m²  Body mass index is 36.84 kg/m².   Oxygen saturation   @FLOWAN(10::1)@ SpO2  Min: 97 %  Max: 99 %    General Appearance:   no acute distress  Alert and oriented x3  HENT:   lips not cyanotic  Atraumatic  Neck:  thyroid not enlarged  supple  Respiratory:  no respiratory distress  normal breath sounds  no rales  Cardiovascular:  no jugular venous distention  regular rhythm  apical impulse normal  S1 normal, S2 normal  no S3, no S4   no murmur  no rub, no thrill  no carotid bruit  pedal pulses normal  lower extremity edema: none    Gastrointestinal:   bowel sounds normal  non-tender  no hepatomegaly, no splenomegaly  Musculoskeletal:  no clubbing of fingers.   normocephalic, head atraumatic  Skin:   warm, dry  No rashes  Neuro/Psychiatric:  normal mood and affect  judgement and insight appropriate      Cardiographics:     ECG  (personally reviewed)    Telemetry:  (personally reviewed) normal sinus rhythm          No results found for this or any previous visit.     Cardiolite (Tc-99m Sestamibi) stress test                  Lab Review:       CBC          7/23/2024    00:46   CBC   WBC 8.68    RBC 4.30    Hemoglobin 13.9    Hematocrit 41.8    MCV 97.2    MCH 32.3    MCHC 33.3    RDW 12.8    Platelets 265        CMP          11/20/2023    08:43 7/23/2024    00:46   CMP   Glucose 158  90    BUN 16  24 " "   Creatinine 1.11  1.05    EGFR 72.8  77.3    Sodium 140  140    Potassium 4.6  4.1    Chloride 103  104    Calcium 9.3  9.5    Total Protein 7.3  7.0    Albumin 4.7  4.6    Globulin 2.6  2.4    Total Bilirubin 0.9  1.2    Alkaline Phosphatase 78  85    AST (SGOT) 24  22    ALT (SGPT) 28  29    Albumin/Globulin Ratio 1.8  1.9    BUN/Creatinine Ratio 14.4  22.9    Anion Gap 8.2  8.1         CARDIAC LABS:       No results found for: \"DIGOXIN\"   Lab Results   Component Value Date    TSH 0.892 12/05/2022           Invalid input(s): \"LDLCALC\"  Lab Results   Component Value Date    POCTROP 0.00 05/07/2021     No results found for: \"DDIMERQUAN\"  Lab Results   Component Value Date    MG 2.6 (H) 07/23/2024             CARDIAC LABS:          Assessment:    Chest pain, atypical      Chest pain atypical in nature brief 3 sharp episodes now resolved workup unremarkable patient concerned about his pacemaker interrogated device myself working normally no events recorded normal threshold impedance and sensing values.  Suspect symptoms likely just muscle skeletal in nature feel patient can be discharged and follow-up with his outpatient cardiologist.      Thank you for allowing us to share in Ricky W Sturgeon care.            Barney Johnson MD   7/23/2024    10:34 EDT    "

## 2024-07-23 NOTE — OUTREACH NOTE
Prep Survey      Flowsheet Row Responses   [facility] facility patient discharged from? [facility]   Is LACE score < 7 ? Yes   Eligibility Not Eligible   What are the reasons patient is not eligible? Other  [LOW RISK FOR READMISSION]   Does the patient have one of the following disease processes/diagnoses(primary or secondary)? Other   Prep survey completed? Yes            Shalini GARCIA - Registered Nurse

## 2024-07-23 NOTE — PLAN OF CARE
Goal Outcome Evaluation:  Plan of Care Reviewed With: patient           Outcome Evaluation: pt is discharging home with family.

## 2024-07-29 ENCOUNTER — TELEPHONE (OUTPATIENT)
Dept: SURGERY | Facility: CLINIC | Age: 69
End: 2024-07-29
Payer: MEDICARE

## 2024-07-30 ENCOUNTER — TELEPHONE (OUTPATIENT)
Dept: CARDIOLOGY | Facility: CLINIC | Age: 69
End: 2024-07-30
Payer: MEDICARE

## 2024-07-30 NOTE — TELEPHONE ENCOUNTER
The [facility] received a fax that requires your attention. The document has been indexed to the patient’s chart for your review.      Reason for sending: EXTERNAL MEDICAL RECORD NOTIFICATION     Documents Description: CARDIAC CLEARANCE REQ-[facility]-7.29.24    Name of Sender: [facility]     Date Indexed: 7.29.24

## 2024-08-01 NOTE — TELEPHONE ENCOUNTER
Procedure: Colonoscopy and/or EGD    Medication Directive: Plavix    PMH: MI 2019, CAD, HTN, HLD    Last Seen: 04/23/24

## 2024-09-05 ENCOUNTER — OFFICE VISIT (OUTPATIENT)
Dept: CARDIOLOGY | Facility: CLINIC | Age: 69
End: 2024-09-05
Payer: MEDICARE

## 2024-09-05 VITALS
DIASTOLIC BLOOD PRESSURE: 71 MMHG | WEIGHT: 220 LBS | SYSTOLIC BLOOD PRESSURE: 115 MMHG | HEIGHT: 67 IN | HEART RATE: 59 BPM | BODY MASS INDEX: 34.53 KG/M2

## 2024-09-05 DIAGNOSIS — E78.2 MIXED HYPERLIPIDEMIA: ICD-10-CM

## 2024-09-05 DIAGNOSIS — I10 ESSENTIAL HYPERTENSION: ICD-10-CM

## 2024-09-05 DIAGNOSIS — I25.10 CORONARY ARTERY DISEASE INVOLVING NATIVE CORONARY ARTERY OF NATIVE HEART WITHOUT ANGINA PECTORIS: Primary | ICD-10-CM

## 2024-09-05 DIAGNOSIS — Z95.0 CARDIAC PACEMAKER: ICD-10-CM

## 2024-09-05 DIAGNOSIS — I49.5 SSS (SICK SINUS SYNDROME): ICD-10-CM

## 2024-09-05 DIAGNOSIS — Z01.810 PREOP CARDIOVASCULAR EXAM: ICD-10-CM

## 2024-09-05 NOTE — PROGRESS NOTES
CARDIOLOGY FOLLOW-UP PROGRESS NOTE        Chief Complaint  Coronary Artery Disease and Follow-up    Subjective            Ricky W Sturgeon presents to [facility] CARDIOLOGY  History of Present Illness      The patient is cardiac wise stable. He has a normal functioning pacemaker. The patient is scheduled for left knee surgery. His physical activities are limited due to his knee discomfort. Unable to report angina or dyspnea because he is now very sedentary. The patient has past medical history of severe coronary artery disease and previous myocardial infarction in 2019 at that time he had a cardiac catheterization which showed severe marginal stenosis about 90% which was treated with PCI drug-eluting stent.  He has residual 60% stenosis on the mid to distal LAD, mild disease in the right coronary artery.  It was described a 90% stenosis of the septal branch of the LAD and some residual stenosis about 50% of the distal circumflex artery.  His LV function is preserved with a EF of 60% by last 2D echo.        Past History:    Medical History:  Past Medical History:   Diagnosis Date    Allergic rhinitis     Arthritis     Bulging lumbar disc     Chest pain     Chronic back pain     Congestive heart failure (CHF)     DM (diabetes mellitus)     type 2    Dysphagia     Elevated cholesterol     Follow-up examination following surgery 10/09/2014    Gastroesophageal reflux disease     GERD (gastroesophageal reflux disease)     Heart attack     7/2019    Hemorrhoids, external     High blood pressure     High cholesterol     Lumbago     LOW BACK PAIN    Mood disorder     Prostate disorder     Shortness of breath     Sleep apnea     TIA (transient ischemic attack)     unable to do MRI to confirm       Surgical History: has a past surgical history that includes Colonoscopy; Esophagogastroduodenoscopy; Heel spur surgery (Bilateral); Inguinal hernia repair (Left); Shoulder surgery (Bilateral); Pacemaker  "Implantation; Coronary angioplasty with stent; Esophagogastroduodenoscopy (N/A, 7/28/2021); and Colonoscopy (N/A, 7/28/2021).     Family History: family history includes Cancer in an other family member; Colon cancer in his father and mother; Diabetes in his mother; Esophageal cancer in his brother; Stroke in an other family member.     Social History: reports that he has never smoked. He has never used smokeless tobacco. He reports that he does not currently use alcohol. He reports that he does not use drugs.    Allergies: Sulfa antibiotics    Current Outpatient Medications on File Prior to Visit   Medication Sig    atorvastatin (LIPITOR) 80 MG tablet Take 1 tablet by mouth Every Night.    clopidogrel (PLAVIX) 75 MG tablet Take 1 tablet by mouth Daily.    Cyanocobalamin 1000 MCG sublingual tablet Place 1 tablet under the tongue Daily.    escitalopram (LEXAPRO) 20 MG tablet Take 1 tablet by mouth Daily.    furosemide (LASIX) 40 MG tablet Take 1 tablet by mouth 2 (Two) Times a Day. (Patient taking differently: Take 1 tablet by mouth Daily.)    HYDROcodone-acetaminophen (NORCO) 5-325 MG per tablet Take 1 tablet by mouth 2 (Two) Times a Day.    metFORMIN ER (GLUCOPHAGE-XR) 500 MG 24 hr tablet Take 1 tablet by mouth 2 (Two) Times a Day.    metoprolol succinate XL (TOPROL-XL) 50 MG 24 hr tablet Take 1 tablet by mouth Daily.    pantoprazole (PROTONIX) 40 MG EC tablet Take 1 tablet by mouth Daily.    Semaglutide,0.25 or 0.5MG/DOS, (OZEMPIC) 2 MG/1.5ML solution pen-injector Inject 0.25 mg under the skin into the appropriate area as directed 1 (One) Time Per Week. Wednesday    tamsulosin (FLOMAX) 0.4 MG capsule 24 hr capsule Take 1 capsule by mouth Daily.     No current facility-administered medications on file prior to visit.          Review of Systems : all systems were reviewed and negative except for left knee pain    Objective     /71 (BP Location: Right arm)   Pulse 59   Ht 170.2 cm (67\")   Wt 99.8 kg (220 lb) "   BMI 34.46 kg/m²       Physical Exam    General : Alert, awake, no acute distress  Neck : Supple, no carotid bruit, no jugular venous distention  CVS : Regular rate and rhythm, no murmur, rubs or gallops  Lungs: Clear to auscultation bilaterally, no crackles or rhonchi  Abdomen: Soft, nontender, bowel sounds heard in all 4 quadrants  Extremities: Warm, well-perfused, no pedal edema    Result Review :     The following data was reviewed by: Minor Kaplan MD on 09/05/2024:    CMP          11/20/2023    08:43 7/23/2024    00:46   CMP   Glucose 158  90    BUN 16  24    Creatinine 1.11  1.05    EGFR 72.8  77.3    Sodium 140  140    Potassium 4.6  4.1    Chloride 103  104    Calcium 9.3  9.5    Total Protein 7.3  7.0    Albumin 4.7  4.6    Globulin 2.6  2.4    Total Bilirubin 0.9  1.2    Alkaline Phosphatase 78  85    AST (SGOT) 24  22    ALT (SGPT) 28  29    Albumin/Globulin Ratio 1.8  1.9    BUN/Creatinine Ratio 14.4  22.9    Anion Gap 8.2  8.1      CBC          7/23/2024    00:46   CBC   WBC 8.68    RBC 4.30    Hemoglobin 13.9    Hematocrit 41.8    MCV 97.2    MCH 32.3    MCHC 33.3    RDW 12.8    Platelets 265        Lipid Panel          11/20/2023    08:43 6/25/2024    10:21   Lipid Panel   Total Cholesterol 156  88    Triglycerides 167  106    HDL Cholesterol 35  28    VLDL Cholesterol 29  20    LDL Cholesterol  92  40    LDL/HDL Ratio 2.50  1.39           Data reviewed: Cardiology studies                         Assessment and Plan        Diagnoses and all orders for this visit:    1. Coronary artery disease involving native coronary artery of native heart without angina pectoris (Primary)  -     Adult Transthoracic Echo Complete W/ Cont if Necessary Per Protocol; Future    2. Essential hypertension    3. Mixed hyperlipidemia    4. Cardiac pacemaker    5. SSS (sick sinus syndrome)    6. Preop cardiovascular exam  -     Adult Transthoracic Echo Complete W/ Cont if Necessary Per Protocol; Future  -     Stress Test  With Myocardial Perfusion One Day; Future          The patient is cardiac wise stable with history of severe CAD and previous PCI. His physical activities are very limited and now preop for knee surgery. I would obtain a Pharmacological Nuclear Stress test to evaluate for progression of disease and to determine presence of ischemic burden. Will obtain a 2 DECHO to assess Cardiac function. This will allow us to complete a better risk stratification prior to the surgical procedure. The patient is at risk for Heart Failure and if diastolic dysfunction is present, will consider adding Jardiance to his medical regimen. Advised to reduce the furosemide to 20 mg po daily. Continue with lifestyle modification, heart healthy diet.     Follow Up     Return for After testing, Robert.    Patient was given instructions and counseling regarding his condition or for health maintenance advice. Please see specific information pulled into the AVS if appropriate.

## 2024-10-08 ENCOUNTER — OFFICE VISIT (OUTPATIENT)
Dept: ORTHOPEDIC SURGERY | Facility: CLINIC | Age: 69
End: 2024-10-08
Payer: MEDICARE

## 2024-10-08 ENCOUNTER — PREP FOR SURGERY (OUTPATIENT)
Dept: OTHER | Facility: HOSPITAL | Age: 69
End: 2024-10-08
Payer: MEDICARE

## 2024-10-08 VITALS
OXYGEN SATURATION: 95 % | BODY MASS INDEX: 33.65 KG/M2 | WEIGHT: 222 LBS | HEART RATE: 60 BPM | SYSTOLIC BLOOD PRESSURE: 132 MMHG | DIASTOLIC BLOOD PRESSURE: 78 MMHG | HEIGHT: 68 IN

## 2024-10-08 DIAGNOSIS — M25.562 LEFT KNEE PAIN, UNSPECIFIED CHRONICITY: Primary | ICD-10-CM

## 2024-10-08 DIAGNOSIS — Z47.1 AFTERCARE FOLLOWING LEFT KNEE JOINT REPLACEMENT SURGERY: Primary | ICD-10-CM

## 2024-10-08 DIAGNOSIS — M17.12 OSTEOARTHRITIS OF LEFT KNEE, UNSPECIFIED OSTEOARTHRITIS TYPE: ICD-10-CM

## 2024-10-08 DIAGNOSIS — Z96.652 AFTERCARE FOLLOWING LEFT KNEE JOINT REPLACEMENT SURGERY: Primary | ICD-10-CM

## 2024-10-08 PROBLEM — M17.9 OA (OSTEOARTHRITIS) OF KNEE: Status: ACTIVE | Noted: 2024-10-08

## 2024-10-08 RX ORDER — TRANEXAMIC ACID 10 MG/ML
1000 INJECTION, SOLUTION INTRAVENOUS ONCE
OUTPATIENT
Start: 2024-10-08 | End: 2024-10-08

## 2024-10-08 NOTE — PROGRESS NOTES
"Chief Complaint  Initial Evaluation of the Left Knee     Subjective      Ricky W Sturgeon presents to [facility] for initial evaluation of the left knee. He has pain in the left knee for awhile.  He had a CT scan noting severe arthritis and loose bodies.  He had an X ray that noted arthritis.  He has had injections in the knee that used to help.  He notes he has had therapy and exercises in the past.  He notes increase pain with prolonged standing, ambulation and stairs.  He has lost 20 pounds the last six months.  He needs cardiac clearance.     Allergies   Allergen Reactions    Sulfa Antibiotics Angioedema        Social History     Socioeconomic History    Marital status:    Tobacco Use    Smoking status: Never    Smokeless tobacco: Never   Vaping Use    Vaping status: Never Used   Substance and Sexual Activity    Alcohol use: Not Currently    Drug use: Never    Sexual activity: Defer        I reviewed the patient's chief complaint, history of present illness, review of systems, past medical history, surgical history, family history, social history, medications, and allergy list.     Review of Systems     Constitutional: Denies fevers, chills, weight loss  Cardiovascular: Denies chest pain, shortness of breath  Skin: Denies rashes, acute skin changes  Neurologic: Denies headache, loss of consciousness        Vital Signs:   /78   Pulse 60   Ht 172.7 cm (68\")   Wt 101 kg (222 lb)   SpO2 95%   BMI 33.75 kg/m²          Physical Exam  General: Alert. No acute distress    Ortho Exam        LEFT KNEE Flexion 110. Extension 0. Stable to varus/valgus stress. Stable to anterior/posterior drawer. Neurovascularly intact. Negative Tayler. Negative Lachman. Positive EHL, FHL, HS and TA. Sensation intact to light touch all 5 nerves of the foot. Ambulates with Antalgic gait. Patella is well tracking. Calf supple, non-tender. Positive tenderness to the medial joint line. Positive " tenderness to the lateral joint line. Positive Crepitus. Good strength to hamstrings, quadriceps, dorsiflexors, and plantar flexors.  Knee Extensor Mechanism intact       Procedures      Imaging Results (Most Recent)       None             Result Review :     X rays on CD showing severe arthritis.      X-Ray Report:  Left knee X-Ray  Indication: Evaluation of the left knee  AP/Lateral and [place] view(s)  Findings: Advanced degenerative bone on bone arthritis.   Prior studies available for comparison: yes           Assessment and Plan     Diagnoses and all orders for this visit:    1. Left knee pain, unspecified chronicity (Primary)    2. Osteoarthritis of left knee, unspecified osteoarthritis type        Discussed the treatment plan with the patient. I reviewed the X-rays that were obtained brought on CD with the patient.     Discussed the treatment options with the patient, operative vs non-operative. The patient expressed understanding and wished to proceed with a left total knee arthroplasty       Discussed surgery., Risks/benefits discussed with patient including, but not limited to: infection, bleeding, neurovascular damage, re-rupture, aesthetic deformity, need for further surgery, and death., Discussed with patient the implant type being used during surgery and patient understands., Surgery pamphlet given., Call or return if worsening symptoms., and DME order for a 3 in 1 given today due to patient will be confined to one room/level of the home that does not offer a toilet during postop recovery.     Follow Up     2 weeks postoperatively       Patient was given instructions and counseling regarding his condition or for health maintenance advice. Please see specific information pulled into the AVS if appropriate.     Scribed for Rita Calix MD by Laura Huddleston MA.  10/08/24   13:45 EDT    I have personally performed the services described in this document as scribed by the above individual and it is  both accurate and complete. Rita Calix MD 10/08/24

## 2024-10-18 DIAGNOSIS — Z01.818 ENCOUNTER FOR PREADMISSION TESTING: Primary | ICD-10-CM

## 2024-10-23 ENCOUNTER — PRE-ADMISSION TESTING (OUTPATIENT)
Dept: PREADMISSION TESTING | Facility: HOSPITAL | Age: 69
End: 2024-10-23
Payer: MEDICARE

## 2024-10-23 VITALS
RESPIRATION RATE: 20 BRPM | BODY MASS INDEX: 33.08 KG/M2 | HEIGHT: 69 IN | TEMPERATURE: 97.3 F | DIASTOLIC BLOOD PRESSURE: 80 MMHG | OXYGEN SATURATION: 95 % | SYSTOLIC BLOOD PRESSURE: 106 MMHG | WEIGHT: 223.33 LBS | HEART RATE: 60 BPM

## 2024-10-23 DIAGNOSIS — Z01.818 ENCOUNTER FOR PREADMISSION TESTING: ICD-10-CM

## 2024-10-23 DIAGNOSIS — M25.562 LEFT KNEE PAIN, UNSPECIFIED CHRONICITY: ICD-10-CM

## 2024-10-23 LAB
ALBUMIN SERPL-MCNC: 4.2 G/DL (ref 3.5–5.2)
ALBUMIN/GLOB SERPL: 1.6 G/DL
ALP SERPL-CCNC: 85 U/L (ref 39–117)
ALT SERPL W P-5'-P-CCNC: 23 U/L (ref 1–41)
ANION GAP SERPL CALCULATED.3IONS-SCNC: 9.2 MMOL/L (ref 5–15)
AST SERPL-CCNC: 17 U/L (ref 1–40)
BASOPHILS # BLD AUTO: 0.06 10*3/MM3 (ref 0–0.2)
BASOPHILS NFR BLD AUTO: 0.8 % (ref 0–1.5)
BILIRUB SERPL-MCNC: 0.9 MG/DL (ref 0–1.2)
BUN SERPL-MCNC: 25 MG/DL (ref 8–23)
BUN/CREAT SERPL: 25 (ref 7–25)
CALCIUM SPEC-SCNC: 9 MG/DL (ref 8.6–10.5)
CHLORIDE SERPL-SCNC: 105 MMOL/L (ref 98–107)
CO2 SERPL-SCNC: 27.8 MMOL/L (ref 22–29)
CREAT SERPL-MCNC: 1 MG/DL (ref 0.76–1.27)
DEPRECATED RDW RBC AUTO: 43.8 FL (ref 37–54)
EGFRCR SERPLBLD CKD-EPI 2021: 82 ML/MIN/1.73
EOSINOPHIL # BLD AUTO: 0.18 10*3/MM3 (ref 0–0.4)
EOSINOPHIL NFR BLD AUTO: 2.5 % (ref 0.3–6.2)
ERYTHROCYTE [DISTWIDTH] IN BLOOD BY AUTOMATED COUNT: 12.4 % (ref 12.3–15.4)
GLOBULIN UR ELPH-MCNC: 2.7 GM/DL
GLUCOSE SERPL-MCNC: 135 MG/DL (ref 65–99)
HBA1C MFR BLD: 6.2 % (ref 4.8–5.6)
HCT VFR BLD AUTO: 41.2 % (ref 37.5–51)
HGB BLD-MCNC: 13.8 G/DL (ref 13–17.7)
IMM GRANULOCYTES # BLD AUTO: 0.02 10*3/MM3 (ref 0–0.05)
IMM GRANULOCYTES NFR BLD AUTO: 0.3 % (ref 0–0.5)
LYMPHOCYTES # BLD AUTO: 2.08 10*3/MM3 (ref 0.7–3.1)
LYMPHOCYTES NFR BLD AUTO: 29.2 % (ref 19.6–45.3)
MCH RBC QN AUTO: 32.2 PG (ref 26.6–33)
MCHC RBC AUTO-ENTMCNC: 33.5 G/DL (ref 31.5–35.7)
MCV RBC AUTO: 96.3 FL (ref 79–97)
MONOCYTES # BLD AUTO: 0.52 10*3/MM3 (ref 0.1–0.9)
MONOCYTES NFR BLD AUTO: 7.3 % (ref 5–12)
NEUTROPHILS NFR BLD AUTO: 4.26 10*3/MM3 (ref 1.7–7)
NEUTROPHILS NFR BLD AUTO: 59.9 % (ref 42.7–76)
NRBC BLD AUTO-RTO: 0 /100 WBC (ref 0–0.2)
PLATELET # BLD AUTO: 276 10*3/MM3 (ref 140–450)
PMV BLD AUTO: 10 FL (ref 6–12)
POTASSIUM SERPL-SCNC: 4.6 MMOL/L (ref 3.5–5.2)
PROT SERPL-MCNC: 6.9 G/DL (ref 6–8.5)
RBC # BLD AUTO: 4.28 10*6/MM3 (ref 4.14–5.8)
SODIUM SERPL-SCNC: 142 MMOL/L (ref 136–145)
WBC NRBC COR # BLD AUTO: 7.12 10*3/MM3 (ref 3.4–10.8)

## 2024-10-23 PROCEDURE — 36415 COLL VENOUS BLD VENIPUNCTURE: CPT

## 2024-10-23 PROCEDURE — 83036 HEMOGLOBIN GLYCOSYLATED A1C: CPT

## 2024-10-23 PROCEDURE — 85025 COMPLETE CBC W/AUTO DIFF WBC: CPT

## 2024-10-23 PROCEDURE — 80053 COMPREHEN METABOLIC PANEL: CPT

## 2024-10-23 RX ORDER — POLYETHYLENE GLYCOL 3350 17 G/17G
17 POWDER, FOR SOLUTION ORAL DAILY
COMMUNITY

## 2024-10-23 NOTE — TELEPHONE ENCOUNTER
Caller: STURGEON,BARBARA    Relationship: Emergency Contact    Best call back number: [phone]     Requested Prescriptions:   Requested Prescriptions     Pending Prescriptions Disp Refills    clopidogrel (PLAVIX) 75 MG tablet 90 tablet 3     Sig: Take 1 tablet by mouth Daily.        Pharmacy where request should be sent: [facility], [facility], & [facility], [place] - [address] - [phone]  - [phone] FX     Last office visit with prescribing clinician: 9/5/2024   Last telemedicine visit with prescribing clinician: Visit date not found   Next office visit with prescribing clinician: Visit date not found     Additional details provided by patient: 90 DAY SUPPLY PLEASE. THANK YOU!    Does the patient have less than a 3 day supply:  [x] Yes  [] No    Would you like a call back once the refill request has been completed: [] Yes [] No    If the office needs to give you a call back, can they leave a voicemail: [] Yes [] No    Nhi Chinchilla Rep   10/23/24 15:05 EDT

## 2024-10-23 NOTE — SIGNIFICANT NOTE
Pt goal is to have less pain.  Pt plans to use [facility] in [place].  Pt's spouse to be support with aftercare.

## 2024-10-23 NOTE — DISCHARGE INSTRUCTIONS
IMPORTANT INSTRUCTIONS - PRE-ADMISSION TESTING  DO NOT EAT OR CHEW anything after midnight the night before your procedure.    You may have CLEAR liquids up to __3____ hours prior to ARRIVAL time.   Take the following medications the morning of your procedure with JUST A SIP OF WATER:  __Lexapro, lipitor     Take metformin by 6 p.m. the day before surgery and Do NOT Take am of surgery     Hold Lasix am of surgery     DO NOT BRING your medications to the hospital with you, UNLESS something has changed since your PRE-Admission Testing appointment.  Hold all vitamins, supplements, and NSAIDS (Non- steroidal anti-inflammatory meds) for one week prior to surgery (you MAY take Tylenol or Acetaminophen). Including Vitamin B 12  If you are diabetic, check your blood sugar the morning of your procedure. If it is less than 70 or if you are feeling symptomatic, call the following number for further instructions: [phone].  Use your inhalers/nebulizers as usual, the morning of your procedure. BRING YOUR INHALERS with you.   Bring your CPAP or BIPAP to hospital, ONLY IF YOU WILL BE SPENDING THE NIGHT.   You will receive a phone call the day before surgery with your arrival time   If you have any questions, please call your Pre-Admission Testing Nurse, _CONRADO BOLTON R.N.  at [phone].           Find out when you need to start a clear liquid diet.   Think of “clear liquids” as anything you could read a newspaper through. This includes things like water, broth, sports drinks, or tea WITHOUT any kind of milk or cream.           Once you are told to start a clear liquid diet, only drink these things until 2 hours before arrival to the hospital or when the hospital says to stop. Total volume limitation: 8 oz.       Clear liquids you CAN drink:   Water   Clear broth: beef, chicken, vegetable, or bone broth with nothing in it   Gatorade   Lemonade or Dwayne-aid   Soda   Tea, coffee (NO cream or honey)   Jell-O (without fruit)    Popsicles (without fruit or cream)   Italian ices   Juice without pulp: apple, white, grape   You may use salt, pepper, and sugar  No red liquid   Drink a 20 oz bottle of gatorade am of surgery or at bedtime the night before    Do NOT drink:   Milk or cream   Soy milk, almond milk, coconut milk, or other non-dairy drinks and   creamers   Milkshakes or smoothies   Tomato juice   Orange juice   Grapefruit juice   Cream soups or any other than broth         Clear Liquid Diet:  Do NOT eat any solid food.  Do NOT eat or suck on mints or candy.  Do NOT chew gum.  Do NOT drink thick liquids like milk or juice with pulp in it.  Do NOT add milk, cream, or anything like soy milk or almond milk to coffee or tea.     PREOPERATIVE (BEFORE SURGERY)              BATHING INSTRUCTIONS  Instructions:    You will need to shower 3 separate times utilizing the soap provided; at the times indicated   below:     10/11/6/24 PM     11/7/24  AM       11/7/24  PM       Wash your hair and face with normal shampoo and soap, rinse it well before using the surgical soap.      In the shower, wet the skin completely with water from your neck to your feet. Apply the cleanser to your   body ONLY FROM THE NECK TO YOUR FEET.     Do NOT USE THE CLEANSER ON YOUR FACE, HEAD, OR GENITAL (PRIVATE) AREAS.   Keep it out of your eyes, ears, and mouth because of the risk of injury to those areas.      Scrub with a clean washcloth for each bath utilizing the soap provided from the top of your body to the   bottom starting at the neck area.      Pay close attention to your armpits, groin area, and the site of surgery.      Wash your body gently for 5 minutes. Stand outside the stream or turn off the water while scrubbing your   body. Do NOT wash with your regular soap after the surgical cleanser is used.      RINSE THE CLEANSER OFF COMPLETELY with plenty of water. Rinse the area again thoroughly.      Dry off with a clean towel. The surgical soap can cause  dryness; however do NOT APPLY LOTION,   CREAM, POWDER, and/or DEODORANT AFTER SHOWERING.     Be sure to where clean clothes after showering.      Ensure CLEAN BED LINENS AFTER FIRST wash with the surgical soap.      NO PETS ALLOWED IN THE BED with you after utilizing the surgical soap.

## 2024-10-24 RX ORDER — CLOPIDOGREL BISULFATE 75 MG/1
75 TABLET ORAL DAILY
Qty: 90 TABLET | Refills: 3 | Status: SHIPPED | OUTPATIENT
Start: 2024-10-24

## 2024-10-25 ENCOUNTER — HOSPITAL ENCOUNTER (OUTPATIENT)
Dept: NUCLEAR MEDICINE | Facility: HOSPITAL | Age: 69
Discharge: HOME | End: 2024-10-25
Payer: MEDICARE

## 2024-10-25 ENCOUNTER — HOSPITAL ENCOUNTER (OUTPATIENT)
Dept: CARDIOLOGY | Facility: HOSPITAL | Age: 69
Discharge: HOME | End: 2024-10-25
Payer: MEDICARE

## 2024-10-25 DIAGNOSIS — I25.10 CORONARY ARTERY DISEASE INVOLVING NATIVE CORONARY ARTERY OF NATIVE HEART WITHOUT ANGINA PECTORIS: ICD-10-CM

## 2024-10-25 DIAGNOSIS — Z01.810 PREOP CARDIOVASCULAR EXAM: ICD-10-CM

## 2024-10-25 LAB
AORTIC DIMENSIONLESS INDEX: 0.73 (DI)
ASCENDING AORTA: 3.5 CM
BH CV ECHO MEAS - ACS: 1.93 CM
BH CV ECHO MEAS - AO MAX PG: 5.6 MMHG
BH CV ECHO MEAS - AO MEAN PG: 2.6 MMHG
BH CV ECHO MEAS - AO ROOT DIAM: 3.2 CM
BH CV ECHO MEAS - AO V2 MAX: 118.1 CM/SEC
BH CV ECHO MEAS - AO V2 VTI: 22.2 CM
BH CV ECHO MEAS - AVA(I,D): 2.8 CM2
BH CV ECHO MEAS - EDV(CUBED): 120 ML
BH CV ECHO MEAS - EDV(MOD-SP2): 90.4 ML
BH CV ECHO MEAS - EDV(MOD-SP4): 114 ML
BH CV ECHO MEAS - EF(MOD-BP): 71.6 %
BH CV ECHO MEAS - EF(MOD-SP2): 72.9 %
BH CV ECHO MEAS - EF(MOD-SP4): 72.1 %
BH CV ECHO MEAS - ESV(CUBED): 24.3 ML
BH CV ECHO MEAS - ESV(MOD-SP2): 24.5 ML
BH CV ECHO MEAS - ESV(MOD-SP4): 31.8 ML
BH CV ECHO MEAS - FS: 41.3 %
BH CV ECHO MEAS - IVS/LVPW: 1.08 CM
BH CV ECHO MEAS - IVSD: 1.36 CM
BH CV ECHO MEAS - LA DIMENSION: 5 CM
BH CV ECHO MEAS - LAT PEAK E' VEL: 8.3 CM/SEC
BH CV ECHO MEAS - LV MASS(C)D: 259.2 GRAMS
BH CV ECHO MEAS - LV MAX PG: 3.8 MMHG
BH CV ECHO MEAS - LV MEAN PG: 1.88 MMHG
BH CV ECHO MEAS - LV V1 MAX: 96.8 CM/SEC
BH CV ECHO MEAS - LV V1 VTI: 16.2 CM
BH CV ECHO MEAS - LVIDD: 4.9 CM
BH CV ECHO MEAS - LVIDS: 2.9 CM
BH CV ECHO MEAS - LVOT AREA: 3.8 CM2
BH CV ECHO MEAS - LVOT DIAM: 2.2 CM
BH CV ECHO MEAS - LVPWD: 1.26 CM
BH CV ECHO MEAS - MED PEAK E' VEL: 8.1 CM/SEC
BH CV ECHO MEAS - MV A MAX VEL: 76.4 CM/SEC
BH CV ECHO MEAS - MV DEC SLOPE: 393.1 CM/SEC2
BH CV ECHO MEAS - MV DEC TIME: 0.21 SEC
BH CV ECHO MEAS - MV E MAX VEL: 80 CM/SEC
BH CV ECHO MEAS - MV E/A: 1.05
BH CV ECHO MEAS - MV MAX PG: 2.8 MMHG
BH CV ECHO MEAS - MV MEAN PG: 1.13 MMHG
BH CV ECHO MEAS - MV P1/2T: 64.7 MSEC
BH CV ECHO MEAS - MV V2 VTI: 27.4 CM
BH CV ECHO MEAS - MVA(P1/2T): 3.4 CM2
BH CV ECHO MEAS - MVA(VTI): 2.24 CM2
BH CV ECHO MEAS - PA V2 MAX: 73.3 CM/SEC
BH CV ECHO MEAS - PULM A REVS DUR: 0.17 SEC
BH CV ECHO MEAS - PULM A REVS VEL: 26.6 CM/SEC
BH CV ECHO MEAS - PULM DIAS VEL: 37.1 CM/SEC
BH CV ECHO MEAS - PULM S/D: 1.26
BH CV ECHO MEAS - PULM SYS VEL: 46.8 CM/SEC
BH CV ECHO MEAS - QP/QS: 0.49
BH CV ECHO MEAS - RAP SYSTOLE: 5 MMHG
BH CV ECHO MEAS - RV MAX PG: 0.9 MMHG
BH CV ECHO MEAS - RV V1 MAX: 47.6 CM/SEC
BH CV ECHO MEAS - RV V1 VTI: 10.6 CM
BH CV ECHO MEAS - RVDD: 3.8 CM
BH CV ECHO MEAS - RVOT DIAM: 1.9 CM
BH CV ECHO MEAS - RVSP: 42.6 MMHG
BH CV ECHO MEAS - SV(LVOT): 61.3 ML
BH CV ECHO MEAS - SV(MOD-SP2): 65.9 ML
BH CV ECHO MEAS - SV(MOD-SP4): 82.2 ML
BH CV ECHO MEAS - SV(RVOT): 30.2 ML
BH CV ECHO MEAS - TAPSE (>1.6): 1.78 CM
BH CV ECHO MEAS - TR MAX PG: 37.6 MMHG
BH CV ECHO MEAS - TR MAX VEL: 306.8 CM/SEC
BH CV ECHO MEASUREMENTS AVERAGE E/E' RATIO: 9.76
BH CV IMMEDIATE POST RECOVERY TECH DATA SYMPTOMS: NORMAL
BH CV IMMEDIATE POST TECH DATA BLOOD PRESSURE: NORMAL MMHG
BH CV IMMEDIATE POST TECH DATA HEART RATE: 72 BPM
BH CV IMMEDIATE POST TECH DATA OXYGEN SATS: 92 %
BH CV REST NUCLEAR ISOTOPE DOSE: 9.2 MCI
BH CV SIX MINUTE RECOVERY TECH DATA BLOOD PRESSURE: NORMAL
BH CV SIX MINUTE RECOVERY TECH DATA HEART RATE: 61 BPM
BH CV SIX MINUTE RECOVERY TECH DATA OXYGEN SATURATION: 94 %
BH CV SIX MINUTE RECOVERY TECH DATA SYMPTOMS: NORMAL
BH CV STRESS BP STAGE 1: NORMAL
BH CV STRESS COMMENTS STAGE 1: NORMAL
BH CV STRESS DOSE REGADENOSON STAGE 1: 0.4
BH CV STRESS DURATION MIN STAGE 1: 0
BH CV STRESS DURATION SEC STAGE 1: 10
BH CV STRESS HR STAGE 1: 60
BH CV STRESS NUCLEAR ISOTOPE DOSE: 30.8 MCI
BH CV STRESS O2 STAGE 1: 93
BH CV STRESS PROTOCOL 1: NORMAL
BH CV STRESS RECOVERY BP: NORMAL MMHG
BH CV STRESS RECOVERY HR: 61 BPM
BH CV STRESS RECOVERY O2: 94 %
BH CV STRESS STAGE 1: 1
BH CV THREE MINUTE POST TECH DATA BLOOD PRESSURE: NORMAL MMHG
BH CV THREE MINUTE POST TECH DATA HEART RATE: 63 BPM
BH CV THREE MINUTE POST TECH DATA OXYGEN SATURATION: 97 %
BH CV THREE MINUTE RECOVERY TECH DATA SYMPTOM: NORMAL
BH CV XLRA - TDI S': 13.4 CM/SEC
IVRT: 66 MS
LEFT ATRIUM VOLUME INDEX: 33.8 ML/M2
LV EF NUC BP: 63 %
MAXIMAL PREDICTED HEART RATE: 152 BPM
PERCENT MAX PREDICTED HR: 39.47 %
STRESS BASELINE BP: NORMAL MMHG
STRESS BASELINE HR: 60 BPM
STRESS O2 SAT REST: 94 %
STRESS PERCENT HR: 46 %
STRESS POST O2 SAT PEAK: 93 %
STRESS POST PEAK BP: NORMAL MMHG
STRESS POST PEAK HR: 60 BPM
STRESS TARGET HR: 129 BPM

## 2024-10-25 PROCEDURE — 0 TECHNETIUM SESTAMIBI: Performed by: INTERNAL MEDICINE

## 2024-10-25 PROCEDURE — 93017 CV STRESS TEST TRACING ONLY: CPT

## 2024-10-25 PROCEDURE — A9500 TC99M SESTAMIBI: HCPCS | Performed by: INTERNAL MEDICINE

## 2024-10-25 PROCEDURE — 93306 TTE W/DOPPLER COMPLETE: CPT

## 2024-10-25 PROCEDURE — 25010000002 SULFUR HEXAFLUORIDE MICROSPH 60.7-25 MG RECONSTITUTED SUSPENSION: Performed by: INTERNAL MEDICINE

## 2024-10-25 PROCEDURE — 25010000002 REGADENOSON 0.4 MG/5ML SOLUTION: Performed by: INTERNAL MEDICINE

## 2024-10-25 PROCEDURE — 78452 HT MUSCLE IMAGE SPECT MULT: CPT

## 2024-10-25 RX ORDER — REGADENOSON 0.08 MG/ML
0.4 INJECTION, SOLUTION INTRAVENOUS
Status: COMPLETED | OUTPATIENT
Start: 2024-10-25 | End: 2024-10-25

## 2024-10-25 RX ADMIN — REGADENOSON 0.4 MG: 0.08 INJECTION, SOLUTION INTRAVENOUS at 10:44

## 2024-10-25 RX ADMIN — TECHNETIUM TC 99M SESTAMIBI 1 DOSE: 1 INJECTION INTRAVENOUS at 10:44

## 2024-10-25 RX ADMIN — TECHNETIUM TC 99M SESTAMIBI 1 DOSE: 1 INJECTION INTRAVENOUS at 09:39

## 2024-10-25 RX ADMIN — SULFUR HEXAFLUORIDE 4 ML: KIT at 10:20

## 2024-10-29 ENCOUNTER — TELEPHONE (OUTPATIENT)
Dept: CARDIOLOGY | Facility: CLINIC | Age: 69
End: 2024-10-29
Payer: MEDICARE

## 2024-10-29 NOTE — TELEPHONE ENCOUNTER
Procedure: LEFT TOTAL KNEE REPLACEMENT    Medication Directive: PLAVIX    PMH: CAD, HTN, HLD, PACEMAKER, SSS,     Last Seen: 09/05/24    STRESS: 10/25/24- PENDING RESULTS:   Impressions are consistent with an intermediate risk study.    Left ventricular ejection fraction is normal (Calculated EF = 63%).    There is a moderate perfusion defect at the inferolateral wall and apex suggestive of infarction with raj-infarct ischemia. .         ECHO: 10/25/24 PENDING RESULTS:   Left ventricular ejection fraction appears to be 61 - 65%.    Left ventricular wall thickness is consistent with mild to moderate concentric hypertrophy.    Left ventricular diastolic function is consistent with (grade I) impaired relaxation.    The left atrial cavity is severely dilated.    Estimated right ventricular systolic pressure from tricuspid regurgitation is mildly elevated (35-45 mmHg).

## 2024-10-30 ENCOUNTER — TELEPHONE (OUTPATIENT)
Dept: CARDIOLOGY | Facility: CLINIC | Age: 69
End: 2024-10-30
Payer: MEDICARE

## 2024-10-30 ENCOUNTER — TELEPHONE (OUTPATIENT)
Dept: ORTHOPEDIC SURGERY | Facility: CLINIC | Age: 69
End: 2024-10-30
Payer: MEDICARE

## 2024-10-30 NOTE — TELEPHONE ENCOUNTER
He may proceed with upcoming knee surgery at moderate risk for perioperative cardiac events.  He may hold Plavix for 5 days prior to procedure.

## 2024-10-30 NOTE — TELEPHONE ENCOUNTER
----- Message from Franchesca [place] sent at 10/30/2024  1:04 PM EDT -----  Echocardiogram shows normal LV systolic function, but also chronic changes including mild to moderate left ventricular hypertrophy and enlarged left atrial cavity.  Recommend starting Jardiance 10 mg daily to prevent congestive heart failure.  Patient is agreeable please send prescription to pharmacy of his choice.  Recommend 3-month follow-up with NP to reevaluate.

## 2024-10-30 NOTE — TELEPHONE ENCOUNTER
LVM FOR PATIENT REQUESTING RETURN CALL TO MYSELF OR [facility]. DIRECT PHONE NUMBER PROVIDED.   PER DR. TALIA ECHAVARRIA PATIENT IS CLEARED FOR SURGERY AND CAN HOLD PLAVIX FOR 5 DAYS PRIOR TO PROCEDURE.

## 2024-10-31 ENCOUNTER — ANESTHESIA EVENT (OUTPATIENT)
Dept: PERIOP | Facility: HOSPITAL | Age: 69
End: 2024-10-31
Payer: MEDICARE

## 2024-10-31 NOTE — TELEPHONE ENCOUNTER
[facility] patient regarding results, medication and f/u. Patient verbalized understanding and appreciation. Prescription sent, f/u scheduled.

## 2024-11-04 NOTE — TELEPHONE ENCOUNTER
[facility] PT WIFE.  SHE ADVISED PT CANNOT AFFORD JARDIANCE.  PROVIDED HER JARDIANCE PT ASSISTANCE PROGRAM PHONE NUMBER.  PT IS GOING TO APPLY FOR ASSISTANCE.

## 2024-11-04 NOTE — TELEPHONE ENCOUNTER
PT WIFE CALLED AND LEFT VM ADVISING JARDIANCE WILL BE $419 PER MONTH AND PT CANNOT AFFORD THIS.  TRIED TO RETURN CALL TWICE WITH NO ANSWER.

## 2024-11-06 ENCOUNTER — TELEPHONE (OUTPATIENT)
Dept: CARDIOLOGY | Facility: CLINIC | Age: 69
End: 2024-11-06
Payer: MEDICARE

## 2024-11-06 NOTE — TELEPHONE ENCOUNTER
Caller: STURGEON,BARBARA    Relationship: Emergency Contact    Best call back number: [phone]     Which medication are you concerned about: JARDIANCE    What are your concerns: PT WAS GIVEN  NUMBER TO CALL TO GET A CHEAPER OPTION OF IT OR [facility] AND THEY HAVE BEEN TRYING THE PAST COUPLE DAYS BUT THE NUMBER IS NOT WORKING AND WANTS TO KNOW IF THERE IS ANY ALTERNATIVES BECAUSE IT IS TOO EXPENSIVE

## 2024-11-06 NOTE — TELEPHONE ENCOUNTER
SW PT WIFE.  ADVISED HER THAT THE NUMBER I GAVE HER A FEW DAYS AGO IS THE ONLY PHONE NUMBER WE HAVE FOR THE JARDIANCE PAP PROGRAM.  I CALLED [facility] AND THE PHONE NUMBER IS WORKING, JUST ADVISES THERE MIGHT BE A WAIT TO SPEAK WITH SOMEONE.  ADVISED HER PAP RENEWAL PERIOD HAS BEGUN AND SHE MIGHT HAVE TO WAIT ON HOLD BEFORE REACHING REPRESENTATIVE.  SHE IS GOING TO TRY TO CALL AGAIN TO APPLY FOR ASSISTANCE.

## 2024-11-07 NOTE — H&P
Chief Complaint  No chief complaint on file.     Subjective      Ricky W Sturgeon presents to [facility] for initial evaluation of the left knee. He has pain in the left knee for awhile.  He had a CT scan noting severe arthritis and loose bodies.  He had an X ray that noted arthritis.  He has had injections in the knee that used to help.  He notes he has had therapy and exercises in the past.  He notes increase pain with prolonged standing, ambulation and stairs.  He has lost 20 pounds the last six months.  He needs cardiac clearance.     Allergies   Allergen Reactions    Sulfa Antibiotics Angioedema    Latex, Natural Rubber Nausea Only        Social History     Socioeconomic History    Marital status:    Tobacco Use    Smoking status: Never    Smokeless tobacco: Never   Vaping Use    Vaping status: Never Used   Substance and Sexual Activity    Alcohol use: Not Currently    Drug use: Never    Sexual activity: Defer        I reviewed the patient's chief complaint, history of present illness, review of systems, past medical history, surgical history, family history, social history, medications, and allergy list.     Review of Systems     Constitutional: Denies fevers, chills, weight loss  Cardiovascular: Denies chest pain, shortness of breath  Skin: Denies rashes, acute skin changes  Neurologic: Denies headache, loss of consciousness        Vital Signs:   There were no vitals taken for this visit.         Physical Exam  General: Alert. No acute distress    Ortho Exam        LEFT KNEE Flexion 110. Extension 0. Stable to varus/valgus stress. Stable to anterior/posterior drawer. Neurovascularly intact. Negative Tayler. Negative Lachman. Positive EHL, FHL, HS and TA. Sensation intact to light touch all 5 nerves of the foot. Ambulates with Antalgic gait. Patella is well tracking. Calf supple, non-tender. Positive tenderness to the medial joint line. Positive tenderness to the lateral joint line.  Positive Crepitus. Good strength to hamstrings, quadriceps, dorsiflexors, and plantar flexors.  Knee Extensor Mechanism intact       Procedures      Imaging Results (Most Recent)       None             Result Review :     X rays on CD showing severe arthritis.      X-Ray Report:  Left knee X-Ray  Indication: Evaluation of the left knee  AP/Lateral and [place] view(s)  Findings: Advanced degenerative bone on bone arthritis.   Prior studies available for comparison: yes           Assessment and Plan     There are no diagnoses linked to this encounter.      Discussed the treatment plan with the patient. I reviewed the X-rays that were obtained brought on CD with the patient.     Discussed the treatment options with the patient, operative vs non-operative. The patient expressed understanding and wished to proceed with a left total knee arthroplasty       Discussed surgery., Risks/benefits discussed with patient including, but not limited to: infection, bleeding, neurovascular damage, re-rupture, aesthetic deformity, need for further surgery, and death., Discussed with patient the implant type being used during surgery and patient understands., Surgery pamphlet given., Call or return if worsening symptoms., and DME order for a 3 in 1 given today due to patient will be confined to one room/level of the home that does not offer a toilet during postop recovery.     Follow Up     2 weeks postoperatively    I have personally performed the services described in this document as scribed by the above individual and it is both accurate and complete. Rita Calix MD 11/06/24

## 2024-11-08 ENCOUNTER — ANESTHESIA EVENT CONVERTED (OUTPATIENT)
Dept: ANESTHESIOLOGY | Facility: HOSPITAL | Age: 69
End: 2024-11-08
Payer: MEDICARE

## 2024-11-08 ENCOUNTER — HOSPITAL ENCOUNTER (OUTPATIENT)
Facility: HOSPITAL | Age: 69
Discharge: HOME | End: 2024-11-09
Attending: ORTHOPAEDIC SURGERY | Admitting: ORTHOPAEDIC SURGERY
Payer: MEDICARE

## 2024-11-08 ENCOUNTER — APPOINTMENT (OUTPATIENT)
Dept: GENERAL RADIOLOGY | Facility: HOSPITAL | Age: 69
End: 2024-11-08
Payer: MEDICARE

## 2024-11-08 ENCOUNTER — ANESTHESIA (OUTPATIENT)
Dept: PERIOP | Facility: HOSPITAL | Age: 69
End: 2024-11-08
Payer: MEDICARE

## 2024-11-08 DIAGNOSIS — Z74.09 IMPAIRED MOBILITY AND ADLS: ICD-10-CM

## 2024-11-08 DIAGNOSIS — M25.562 LEFT KNEE PAIN, UNSPECIFIED CHRONICITY: ICD-10-CM

## 2024-11-08 DIAGNOSIS — R26.2 DIFFICULTY IN WALKING: Primary | ICD-10-CM

## 2024-11-08 DIAGNOSIS — Z78.9 IMPAIRED MOBILITY AND ADLS: ICD-10-CM

## 2024-11-08 LAB
GLUCOSE BLDC GLUCOMTR-MCNC: 130 MG/DL (ref 70–99)
GLUCOSE BLDC GLUCOMTR-MCNC: 170 MG/DL (ref 70–99)
GLUCOSE BLDC GLUCOMTR-MCNC: 183 MG/DL (ref 70–99)
GLUCOSE BLDC GLUCOMTR-MCNC: 188 MG/DL (ref 70–99)

## 2024-11-08 PROCEDURE — C1713 ANCHOR/SCREW BN/BN,TIS/BN: HCPCS | Performed by: ORTHOPAEDIC SURGERY

## 2024-11-08 PROCEDURE — 94799 UNLISTED PULMONARY SVC/PX: CPT

## 2024-11-08 PROCEDURE — 63710000001 INSULIN LISPRO (HUMAN) PER 5 UNITS: Performed by: PHYSICIAN ASSISTANT

## 2024-11-08 PROCEDURE — 25010000002 PROPOFOL 10 MG/ML EMULSION: Performed by: NURSE ANESTHETIST, CERTIFIED REGISTERED

## 2024-11-08 PROCEDURE — 82948 REAGENT STRIP/BLOOD GLUCOSE: CPT | Performed by: NURSE ANESTHETIST, CERTIFIED REGISTERED

## 2024-11-08 PROCEDURE — 25010000002 HYDROMORPHONE 1 MG/ML SOLUTION: Performed by: NURSE ANESTHETIST, CERTIFIED REGISTERED

## 2024-11-08 PROCEDURE — 25010000002 ONDANSETRON PER 1 MG: Performed by: NURSE ANESTHETIST, CERTIFIED REGISTERED

## 2024-11-08 PROCEDURE — 25010000002 ROPIVACAINE PER 1 MG: Performed by: ORTHOPAEDIC SURGERY

## 2024-11-08 PROCEDURE — 82948 REAGENT STRIP/BLOOD GLUCOSE: CPT

## 2024-11-08 PROCEDURE — 25010000002 EPINEPHRINE 1 MG/ML SOLUTION: Performed by: ORTHOPAEDIC SURGERY

## 2024-11-08 PROCEDURE — 97161 PT EVAL LOW COMPLEX 20 MIN: CPT

## 2024-11-08 PROCEDURE — 25010000002 CEFAZOLIN PER 500 MG: Performed by: ORTHOPAEDIC SURGERY

## 2024-11-08 PROCEDURE — 25810000003 LACTATED RINGERS PER 1000 ML: Performed by: STUDENT IN AN ORGANIZED HEALTH CARE EDUCATION/TRAINING PROGRAM

## 2024-11-08 PROCEDURE — 25010000002 MIDAZOLAM PER 1MG: Performed by: STUDENT IN AN ORGANIZED HEALTH CARE EDUCATION/TRAINING PROGRAM

## 2024-11-08 PROCEDURE — 25010000002 MORPHINE PER 10 MG: Performed by: ORTHOPAEDIC SURGERY

## 2024-11-08 PROCEDURE — C1776 JOINT DEVICE (IMPLANTABLE): HCPCS | Performed by: ORTHOPAEDIC SURGERY

## 2024-11-08 PROCEDURE — 25010000002 FENTANYL CITRATE (PF) 50 MCG/ML SOLUTION: Performed by: NURSE ANESTHETIST, CERTIFIED REGISTERED

## 2024-11-08 PROCEDURE — 25010000002 SUGAMMADEX 200 MG/2ML SOLUTION: Performed by: NURSE ANESTHETIST, CERTIFIED REGISTERED

## 2024-11-08 PROCEDURE — 25010000002 LIDOCAINE PF 2% 2 % SOLUTION: Performed by: NURSE ANESTHETIST, CERTIFIED REGISTERED

## 2024-11-08 PROCEDURE — 25010000002 KETOROLAC TROMETHAMINE PER 15 MG: Performed by: ORTHOPAEDIC SURGERY

## 2024-11-08 PROCEDURE — 82948 REAGENT STRIP/BLOOD GLUCOSE: CPT | Performed by: ANESTHESIOLOGY

## 2024-11-08 PROCEDURE — 73560 X-RAY EXAM OF KNEE 1 OR 2: CPT

## 2024-11-08 PROCEDURE — 99213 OFFICE O/P EST LOW 20 MIN: CPT | Performed by: PHYSICIAN ASSISTANT

## 2024-11-08 PROCEDURE — 27447 TOTAL KNEE ARTHROPLASTY: CPT | Performed by: ORTHOPAEDIC SURGERY

## 2024-11-08 PROCEDURE — 25010000002 ROPIVACAINE PER 1 MG: Performed by: STUDENT IN AN ORGANIZED HEALTH CARE EDUCATION/TRAINING PROGRAM

## 2024-11-08 DEVICE — TRY TIB CRUC 75MM: Type: IMPLANTABLE DEVICE | Site: KNEE | Status: FUNCTIONAL

## 2024-11-08 DEVICE — CMT BONE PALACOS R HI/VISC 1X40: Type: IMPLANTABLE DEVICE | Site: KNEE | Status: FUNCTIONAL

## 2024-11-08 DEVICE — COMP FEM/KN VANGUARD INTLK CR 70MM NS LT: Type: IMPLANTABLE DEVICE | Site: KNEE | Status: FUNCTIONAL

## 2024-11-08 DEVICE — BEAR TIB/KN VANGUARD AS 12X75MM NS: Type: IMPLANTABLE DEVICE | Site: KNEE | Status: FUNCTIONAL

## 2024-11-08 DEVICE — PAT 3PEG STD 8X31MM: Type: IMPLANTABLE DEVICE | Site: KNEE | Status: FUNCTIONAL

## 2024-11-08 DEVICE — CAP TOTL KN CMT PRIMARY: Type: IMPLANTABLE DEVICE | Site: KNEE | Status: FUNCTIONAL

## 2024-11-08 RX ORDER — NALOXONE HCL 0.4 MG/ML
0.4 VIAL (ML) INJECTION
Status: DISCONTINUED | OUTPATIENT
Start: 2024-11-08 | End: 2024-11-09 | Stop reason: HOSPADM

## 2024-11-08 RX ORDER — PHENYLEPHRINE HCL IN 0.9% NACL 1 MG/10 ML
SYRINGE (ML) INTRAVENOUS AS NEEDED
Status: DISCONTINUED | OUTPATIENT
Start: 2024-11-08 | End: 2024-11-08 | Stop reason: SURG

## 2024-11-08 RX ORDER — FENTANYL CITRATE 50 UG/ML
INJECTION, SOLUTION INTRAMUSCULAR; INTRAVENOUS AS NEEDED
Status: DISCONTINUED | OUTPATIENT
Start: 2024-11-08 | End: 2024-11-08 | Stop reason: SURG

## 2024-11-08 RX ORDER — OXYCODONE HYDROCHLORIDE 5 MG/1
5 TABLET ORAL
Status: COMPLETED | OUTPATIENT
Start: 2024-11-08 | End: 2024-11-08

## 2024-11-08 RX ORDER — ONDANSETRON 4 MG/1
4 TABLET, ORALLY DISINTEGRATING ORAL EVERY 6 HOURS PRN
Status: DISCONTINUED | OUTPATIENT
Start: 2024-11-08 | End: 2024-11-09 | Stop reason: HOSPADM

## 2024-11-08 RX ORDER — INSULIN LISPRO 100 [IU]/ML
2-7 INJECTION, SOLUTION INTRAVENOUS; SUBCUTANEOUS
Status: DISCONTINUED | OUTPATIENT
Start: 2024-11-08 | End: 2024-11-09 | Stop reason: HOSPADM

## 2024-11-08 RX ORDER — ROCURONIUM BROMIDE 10 MG/ML
INJECTION, SOLUTION INTRAVENOUS AS NEEDED
Status: DISCONTINUED | OUTPATIENT
Start: 2024-11-08 | End: 2024-11-08 | Stop reason: SURG

## 2024-11-08 RX ORDER — ROPIVACAINE HYDROCHLORIDE 2 MG/ML
INJECTION, SOLUTION EPIDURAL; INFILTRATION; PERINEURAL
Status: COMPLETED | OUTPATIENT
Start: 2024-11-08 | End: 2024-11-08

## 2024-11-08 RX ORDER — ACETAMINOPHEN 500 MG
1000 TABLET ORAL ONCE
Status: COMPLETED | OUTPATIENT
Start: 2024-11-08 | End: 2024-11-08

## 2024-11-08 RX ORDER — ATORVASTATIN CALCIUM 40 MG/1
80 TABLET, FILM COATED ORAL NIGHTLY
Status: DISCONTINUED | OUTPATIENT
Start: 2024-11-08 | End: 2024-11-09 | Stop reason: HOSPADM

## 2024-11-08 RX ORDER — IBUPROFEN 600 MG/1
1 TABLET ORAL
Status: DISCONTINUED | OUTPATIENT
Start: 2024-11-08 | End: 2024-11-09 | Stop reason: HOSPADM

## 2024-11-08 RX ORDER — ONDANSETRON 2 MG/ML
4 INJECTION INTRAMUSCULAR; INTRAVENOUS EVERY 6 HOURS PRN
Status: DISCONTINUED | OUTPATIENT
Start: 2024-11-08 | End: 2024-11-09 | Stop reason: HOSPADM

## 2024-11-08 RX ORDER — TRANEXAMIC ACID 10 MG/ML
1000 INJECTION, SOLUTION INTRAVENOUS ONCE
Status: COMPLETED | OUTPATIENT
Start: 2024-11-08 | End: 2024-11-08

## 2024-11-08 RX ORDER — MIDAZOLAM HYDROCHLORIDE 2 MG/2ML
2 INJECTION, SOLUTION INTRAMUSCULAR; INTRAVENOUS ONCE
Status: COMPLETED | OUTPATIENT
Start: 2024-11-08 | End: 2024-11-08

## 2024-11-08 RX ORDER — SODIUM CHLORIDE, SODIUM LACTATE, POTASSIUM CHLORIDE, CALCIUM CHLORIDE 600; 310; 30; 20 MG/100ML; MG/100ML; MG/100ML; MG/100ML
9 INJECTION, SOLUTION INTRAVENOUS CONTINUOUS PRN
Status: DISCONTINUED | OUTPATIENT
Start: 2024-11-08 | End: 2024-11-08 | Stop reason: HOSPADM

## 2024-11-08 RX ORDER — ONDANSETRON 2 MG/ML
INJECTION INTRAMUSCULAR; INTRAVENOUS AS NEEDED
Status: DISCONTINUED | OUTPATIENT
Start: 2024-11-08 | End: 2024-11-08 | Stop reason: SURG

## 2024-11-08 RX ORDER — FERROUS SULFATE 325(65) MG
325 TABLET ORAL
Status: DISCONTINUED | OUTPATIENT
Start: 2024-11-09 | End: 2024-11-09 | Stop reason: HOSPADM

## 2024-11-08 RX ORDER — HYDROCODONE BITARTRATE AND ACETAMINOPHEN 7.5; 325 MG/1; MG/1
1 TABLET ORAL EVERY 4 HOURS PRN
Status: DISCONTINUED | OUTPATIENT
Start: 2024-11-08 | End: 2024-11-09 | Stop reason: HOSPADM

## 2024-11-08 RX ORDER — ESCITALOPRAM OXALATE 10 MG/1
20 TABLET ORAL DAILY
Status: DISCONTINUED | OUTPATIENT
Start: 2024-11-09 | End: 2024-11-09 | Stop reason: HOSPADM

## 2024-11-08 RX ORDER — PROMETHAZINE HYDROCHLORIDE 25 MG/1
25 SUPPOSITORY RECTAL ONCE AS NEEDED
Status: DISCONTINUED | OUTPATIENT
Start: 2024-11-08 | End: 2024-11-08 | Stop reason: HOSPADM

## 2024-11-08 RX ORDER — DEXTROSE MONOHYDRATE 25 G/50ML
25 INJECTION, SOLUTION INTRAVENOUS
Status: DISCONTINUED | OUTPATIENT
Start: 2024-11-08 | End: 2024-11-09 | Stop reason: HOSPADM

## 2024-11-08 RX ORDER — ACETAMINOPHEN 500 MG
1000 TABLET ORAL EVERY 8 HOURS
Status: DISCONTINUED | OUTPATIENT
Start: 2024-11-08 | End: 2024-11-09 | Stop reason: HOSPADM

## 2024-11-08 RX ORDER — ONDANSETRON 2 MG/ML
4 INJECTION INTRAMUSCULAR; INTRAVENOUS ONCE AS NEEDED
Status: DISCONTINUED | OUTPATIENT
Start: 2024-11-08 | End: 2024-11-08 | Stop reason: HOSPADM

## 2024-11-08 RX ORDER — TAMSULOSIN HYDROCHLORIDE 0.4 MG/1
0.4 CAPSULE ORAL DAILY
Status: DISCONTINUED | OUTPATIENT
Start: 2024-11-09 | End: 2024-11-09 | Stop reason: HOSPADM

## 2024-11-08 RX ORDER — HYDROCODONE BITARTRATE AND ACETAMINOPHEN 7.5; 325 MG/1; MG/1
2 TABLET ORAL EVERY 4 HOURS PRN
Status: DISCONTINUED | OUTPATIENT
Start: 2024-11-08 | End: 2024-11-09 | Stop reason: HOSPADM

## 2024-11-08 RX ORDER — PANTOPRAZOLE SODIUM 40 MG/1
40 TABLET, DELAYED RELEASE ORAL DAILY
Status: DISCONTINUED | OUTPATIENT
Start: 2024-11-09 | End: 2024-11-09 | Stop reason: HOSPADM

## 2024-11-08 RX ORDER — KETOROLAC TROMETHAMINE 30 MG/ML
15 INJECTION, SOLUTION INTRAMUSCULAR; INTRAVENOUS EVERY 6 HOURS SCHEDULED
Status: DISCONTINUED | OUTPATIENT
Start: 2024-11-08 | End: 2024-11-09 | Stop reason: HOSPADM

## 2024-11-08 RX ORDER — METOPROLOL SUCCINATE 50 MG/1
50 TABLET, EXTENDED RELEASE ORAL DAILY
Status: DISCONTINUED | OUTPATIENT
Start: 2024-11-09 | End: 2024-11-09

## 2024-11-08 RX ORDER — LIDOCAINE HYDROCHLORIDE 20 MG/ML
INJECTION, SOLUTION EPIDURAL; INFILTRATION; INTRACAUDAL; PERINEURAL AS NEEDED
Status: DISCONTINUED | OUTPATIENT
Start: 2024-11-08 | End: 2024-11-08 | Stop reason: SURG

## 2024-11-08 RX ORDER — ENOXAPARIN SODIUM 100 MG/ML
40 INJECTION SUBCUTANEOUS DAILY
Status: DISCONTINUED | OUTPATIENT
Start: 2024-11-09 | End: 2024-11-09 | Stop reason: HOSPADM

## 2024-11-08 RX ORDER — PROMETHAZINE HYDROCHLORIDE 12.5 MG/1
25 TABLET ORAL ONCE AS NEEDED
Status: DISCONTINUED | OUTPATIENT
Start: 2024-11-08 | End: 2024-11-08 | Stop reason: HOSPADM

## 2024-11-08 RX ORDER — FENTANYL CITRATE 50 UG/ML
50 INJECTION, SOLUTION INTRAMUSCULAR; INTRAVENOUS
Status: DISCONTINUED | OUTPATIENT
Start: 2024-11-08 | End: 2024-11-08 | Stop reason: HOSPADM

## 2024-11-08 RX ORDER — NICOTINE POLACRILEX 4 MG
15 LOZENGE BUCCAL
Status: DISCONTINUED | OUTPATIENT
Start: 2024-11-08 | End: 2024-11-09 | Stop reason: HOSPADM

## 2024-11-08 RX ORDER — AMOXICILLIN 250 MG
2 CAPSULE ORAL 2 TIMES DAILY
Status: DISCONTINUED | OUTPATIENT
Start: 2024-11-08 | End: 2024-11-09 | Stop reason: HOSPADM

## 2024-11-08 RX ORDER — PROPOFOL 10 MG/ML
VIAL (ML) INTRAVENOUS AS NEEDED
Status: DISCONTINUED | OUTPATIENT
Start: 2024-11-08 | End: 2024-11-08 | Stop reason: SURG

## 2024-11-08 RX ORDER — SODIUM CHLORIDE, SODIUM LACTATE, POTASSIUM CHLORIDE, CALCIUM CHLORIDE 600; 310; 30; 20 MG/100ML; MG/100ML; MG/100ML; MG/100ML
100 INJECTION, SOLUTION INTRAVENOUS CONTINUOUS
Status: ACTIVE | OUTPATIENT
Start: 2024-11-08 | End: 2024-11-08

## 2024-11-08 RX ORDER — FAMOTIDINE 20 MG/1
40 TABLET, FILM COATED ORAL DAILY
Status: DISCONTINUED | OUTPATIENT
Start: 2024-11-08 | End: 2024-11-08

## 2024-11-08 RX ADMIN — ROCURONIUM BROMIDE 10 MG: 10 INJECTION, SOLUTION INTRAVENOUS at 13:06

## 2024-11-08 RX ADMIN — FENTANYL CITRATE 50 MCG: 50 INJECTION, SOLUTION INTRAMUSCULAR; INTRAVENOUS at 12:11

## 2024-11-08 RX ADMIN — SODIUM CHLORIDE, POTASSIUM CHLORIDE, SODIUM LACTATE AND CALCIUM CHLORIDE 9 ML/HR: 600; 310; 30; 20 INJECTION, SOLUTION INTRAVENOUS at 10:31

## 2024-11-08 RX ADMIN — PROPOFOL 150 MG: 10 INJECTION, EMULSION INTRAVENOUS at 12:14

## 2024-11-08 RX ADMIN — Medication 100 MCG: at 12:28

## 2024-11-08 RX ADMIN — ROPIVACAINE HYDROCHLORIDE 30 ML: 2 INJECTION, SOLUTION EPIDURAL; INFILTRATION; PERINEURAL at 11:21

## 2024-11-08 RX ADMIN — FENTANYL CITRATE 50 MCG: 50 INJECTION, SOLUTION INTRAMUSCULAR; INTRAVENOUS at 14:32

## 2024-11-08 RX ADMIN — INSULIN LISPRO 2 UNITS: 100 INJECTION, SOLUTION INTRAVENOUS; SUBCUTANEOUS at 21:15

## 2024-11-08 RX ADMIN — HYDROMORPHONE HYDROCHLORIDE 0.5 MG: 1 INJECTION, SOLUTION INTRAMUSCULAR; INTRAVENOUS; SUBCUTANEOUS at 14:00

## 2024-11-08 RX ADMIN — FAMOTIDINE 40 MG: 20 TABLET ORAL at 16:40

## 2024-11-08 RX ADMIN — KETOROLAC TROMETHAMINE 15 MG: 30 INJECTION, SOLUTION INTRAMUSCULAR; INTRAVENOUS at 17:28

## 2024-11-08 RX ADMIN — Medication 100 MCG: at 13:44

## 2024-11-08 RX ADMIN — SENNOSIDES AND DOCUSATE SODIUM 2 TABLET: 50; 8.6 TABLET ORAL at 20:54

## 2024-11-08 RX ADMIN — FENTANYL CITRATE 50 MCG: 50 INJECTION, SOLUTION INTRAMUSCULAR; INTRAVENOUS at 12:39

## 2024-11-08 RX ADMIN — ACETAMINOPHEN 1000 MG: 500 TABLET ORAL at 18:53

## 2024-11-08 RX ADMIN — SUGAMMADEX 200 MG: 100 INJECTION, SOLUTION INTRAVENOUS at 13:51

## 2024-11-08 RX ADMIN — HYDROMORPHONE HYDROCHLORIDE 0.5 MG: 1 INJECTION, SOLUTION INTRAMUSCULAR; INTRAVENOUS; SUBCUTANEOUS at 14:10

## 2024-11-08 RX ADMIN — TRANEXAMIC ACID 1000 MG: 10 INJECTION, SOLUTION INTRAVENOUS at 10:56

## 2024-11-08 RX ADMIN — Medication 200 MCG: at 12:33

## 2024-11-08 RX ADMIN — OXYCODONE HYDROCHLORIDE 5 MG: 5 TABLET ORAL at 14:04

## 2024-11-08 RX ADMIN — MIDAZOLAM HYDROCHLORIDE 2 MG: 1 INJECTION, SOLUTION INTRAMUSCULAR; INTRAVENOUS at 10:56

## 2024-11-08 RX ADMIN — ACETAMINOPHEN 1000 MG: 500 TABLET ORAL at 10:56

## 2024-11-08 RX ADMIN — ROCURONIUM BROMIDE 50 MG: 10 INJECTION, SOLUTION INTRAVENOUS at 12:14

## 2024-11-08 RX ADMIN — HYDROMORPHONE HYDROCHLORIDE 0.5 MG: 1 INJECTION, SOLUTION INTRAMUSCULAR; INTRAVENOUS; SUBCUTANEOUS at 13:05

## 2024-11-08 RX ADMIN — SODIUM CHLORIDE 2 G: 9 INJECTION, SOLUTION INTRAVENOUS at 12:16

## 2024-11-08 RX ADMIN — SODIUM CHLORIDE 2000 MG: 9 INJECTION, SOLUTION INTRAVENOUS at 20:54

## 2024-11-08 RX ADMIN — PROPOFOL 50 MG: 10 INJECTION, EMULSION INTRAVENOUS at 12:43

## 2024-11-08 RX ADMIN — INSULIN LISPRO 2 UNITS: 100 INJECTION, SOLUTION INTRAVENOUS; SUBCUTANEOUS at 18:53

## 2024-11-08 RX ADMIN — OXYCODONE HYDROCHLORIDE 5 MG: 5 TABLET ORAL at 14:20

## 2024-11-08 RX ADMIN — LIDOCAINE HYDROCHLORIDE 60 MG: 20 INJECTION, SOLUTION INTRAVENOUS at 12:14

## 2024-11-08 RX ADMIN — TRANEXAMIC ACID 1000 MG: 10 INJECTION, SOLUTION INTRAVENOUS at 13:22

## 2024-11-08 RX ADMIN — ONDANSETRON HYDROCHLORIDE 4 MG: 2 SOLUTION INTRAMUSCULAR; INTRAVENOUS at 13:33

## 2024-11-08 RX ADMIN — ATORVASTATIN CALCIUM 80 MG: 40 TABLET, FILM COATED ORAL at 20:54

## 2024-11-08 NOTE — THERAPY EVALUATION
Acute Care - Physical Therapy Initial Evaluation  [facility]     Patient Name: Ricky W Sturgeon  : 1955  MRN: [id number]  Today's Date: 2024     Admit date: 2024     Referring Physician: Barney Choudhary MD     Surgery Date:2024   Procedure(s) (LRB):  LEFT TOTAL KNEE ARTHROPLASTY: Replace left knee with artificial implants (Left)          Visit Dx:     ICD-10-CM ICD-9-CM   1. Difficulty in walking  R26.2 719.7   2. Left knee pain, unspecified chronicity  M25.562 719.46     Patient Active Problem List   Diagnosis    Allergic rhinitis    Arthritis    Carotid atherosclerosis, bilateral    Chest pain    Chronic back pain    Low back pain    Congestive heart failure    Coronary artery disease    Dysphagia    Esophageal reflux    Heart attack    Hemorrhoids, external    High blood pressure    High cholesterol    Mood disorder    Other and unspecified disc disorder    Prostate disorder    Shortness of breath    Stroke (cerebrum)    Stroke-like symptoms    Surgery follow-up examination    Cervical radiculopathy    DDD (degenerative disc disease), cervical    Lumbar radiculopathy    Weakness of both lower extremities    Brain fog    Chest pain, atypical    OA (osteoarthritis) of knee    Left knee pain     Past Medical History:   Diagnosis Date    Allergic rhinitis     Arthritis     Bulging lumbar disc     Chest pain     Chronic back pain     Congestive heart failure (CHF)     DM (diabetes mellitus)     type 2    Dysphagia     would get choked easily after starting protonix.  Resolved    Elevated cholesterol     Follow-up examination following surgery 10/09/2014    Gastroesophageal reflux disease     GERD (gastroesophageal reflux disease)     Heart attack     2019    Hemorrhoids, external     High blood pressure     High cholesterol     Lumbago     LOW BACK PAIN    Mood disorder     Prostate disorder     Seizures     last seizure  then taken off medication    Shortness of breath     Sleep apnea      TIA (transient ischemic attack)     unable to do MRI to confirm     Past Surgical History:   Procedure Laterality Date    COLONOSCOPY      COLONOSCOPY N/A 07/28/2021    Procedure: COLONOSCOPY WITH POLYPECTOMY;  Surgeon: Jonas Barriga MD;  Location: [facility] ENDOSCOPY;  Service: General;  Laterality: N/A;  DIVERTICULOSIS, COLON POLYPS    CORONARY ANGIOPLASTY WITH STENT PLACEMENT  2019    ENDOSCOPY      ENDOSCOPY N/A 07/28/2021    Procedure: ESOPHAGOGASTRODUODENOSCOPY WITH BIOPSIES;  Surgeon: Jonas Barriga MD;  Location: [facility] ENDOSCOPY;  Service: General;  Laterality: N/A;  GASTRITIS    HEEL SPUR SURGERY Bilateral     HEEL SPURS REMOVED FROM BOTH HEELS    INGUINAL HERNIA REPAIR Left     PACEMAKER IMPLANTATION  2019    SHOULDER SURGERY Bilateral      PT Assessment (Last 12 Hours)       PT Evaluation and Treatment       Row Name 11/08/24 1500          Physical Therapy Time and Intention    Subjective Information complains of;pain  -JHON     Document Type evaluation  -JHON     Mode of Treatment individual therapy;physical therapy  -JHON     Patient Effort good  -JHON     Symptoms Noted During/After Treatment none  -JHON       Row Name 11/08/24 1500          General Information    Patient Observations alert;cooperative;agree to therapy  -JHON     Prior Level of Function independent:;all household mobility;community mobility  -JHON     Equipment Currently Used at Home none  -JHON     Existing Precautions/Restrictions fall;weight bearing  -JHON     Barriers to Rehab none identified  -JHON       Row Name 11/08/24 1500          Living Environment    Current Living Arrangements home  -JHON     People in Home spouse  -JHON       Row Name 11/08/24 1500          Cognition    Orientation Status (Cognition) oriented x 3  -JHON       Row Name 11/08/24 1500          Range of Motion Comprehensive    General Range of Motion lower extremity range of motion deficits identified  LLE 5-90°  -JHON       Row Name 11/08/24 1500          Strength Comprehensive  (MMT)    General Manual Muscle Testing (MMT) Assessment lower extremity strength deficits identified  LLE 4+/5 except hip 3+/5  -JHON       Row Name 11/08/24 1500          Mobility    Extremity Weight-bearing Status left lower extremity  -JHON     Left Lower Extremity (Weight-bearing Status) weight-bearing as tolerated (WBAT)  -JHON       Row Name 11/08/24 1500          Bed Mobility    Bed Mobility bed mobility (all) activities;supine-sit  -JHON     All Activities, [place] (Bed Mobility) contact guard  -JHON     Supine-Sit [place] (Bed Mobility) contact guard  -JHON       Row Name 11/08/24 1500          Transfers    Transfers bed-chair transfer;sit-stand transfer  -JHON       Row Name 11/08/24 1500          Bed-Chair Transfer    Bed-Chair [place] (Transfers) contact guard  -JHON     Assistive Device (Bed-Chair Transfers) walker, front-wheeled  -JHON       Row Name 11/08/24 1500          Sit-Stand Transfer    Sit-Stand [place] (Transfers) contact guard  -JHON     Assistive Device (Sit-Stand Transfers) walker, front-wheeled  -JHON       Row Name 11/08/24 1500          Gait/Stairs (Locomotion)    Gait/Stairs Locomotion gait/ambulation assistive device  -JHON     [place] Level (Gait) contact guard  -JHON     Assistive Device (Gait) walker, front-wheeled  -JHON     Patient was able to Ambulate yes  -JHON     Distance in Feet (Gait) 20  -JHON     Pattern (Gait) step-to  -JHON       Row Name 11/08/24 1500          Safety Issues/Impairments Affecting Functional Mobility    Impairments Affecting Function (Mobility) balance;pain;range of motion (ROM);strength  -JHON       Row Name 11/08/24 1500          Balance    Balance Assessment standing dynamic balance  -JHON     Dynamic Standing Balance contact guard  -JHON     Position/Device Used, Standing Balance walker, front-wheeled  -JHON       Row Name             Wound 11/08/24 1242 Left anterior knee    Wound - Properties Group Placement Date: 11/08/24  - Placement Time: 1242 -EH Side: Left   -EH Orientation: anterior  -EH Location: knee  -EH Primary Wound Type: Incision  -EH    Retired Wound - Properties Group Placement Date: 11/08/24  -EH Placement Time: 1242  -EH Side: Left  -EH Orientation: anterior  -EH Location: knee  -EH Primary Wound Type: Incision  -EH    Retired Wound - Properties Group Placement Date: 11/08/24  -EH Placement Time: 1242  -EH Side: Left  -EH Orientation: anterior  -EH Location: knee  -EH Primary Wound Type: Incision  -EH    Retired Wound - Properties Group Date first assessed: 11/08/24  -EH Time first assessed: 1242  -EH Side: Left  -EH Location: knee  -EH Primary Wound Type: Incision  -EH      Row Name 11/08/24 1500          Plan of Care Review    Plan of Care Reviewed With patient  -JHON     Outcome Evaluation Pt presents with decreased ROM, strength, transfers and ambulation.  Skilled PT services will be required to address these mobility deficits.  -JHON       Row Name 11/08/24 1500          Therapy Assessment/Plan (PT)    Patient/Family Therapy Goals Statement (PT) Walk wihtout pain  -JHON     Rehab Potential (PT) good  -JHON     Criteria for Skilled Interventions Met (PT) skilled treatment is necessary  -JHON     Therapy Frequency (PT) 2 times/day  -JHON     Predicted Duration of Therapy Intervention (PT) 10 days  -JHON     Problem List (PT) problems related to;balance;mobility;range of motion (ROM);strength;pain  -JHON     Activity Limitations Related to Problem List (PT) unable to ambulate safely;unable to transfer safely  -JHON       Row Name 11/08/24 1500          PT Evaluation Complexity    History, PT Evaluation Complexity no personal factors and/or comorbidities  -JHON     Examination of Body Systems (PT Eval Complexity) total of 4 or more elements  -JHON     Clinical Presentation (PT Evaluation Complexity) stable  -JHON     Clinical Decision Making (PT Evaluation Complexity) low complexity  -JHON     Overall Complexity (PT Evaluation Complexity) low complexity  -JHON       Row Name 11/08/24  1500          Therapy Plan Review/Discharge Plan (PT)    Therapy Plan Review (PT) evaluation/treatment results reviewed;participants voiced agreement with care plan;participants included;patient  -JHON       Row Name 11/08/24 1500          Physical Therapy Goals    Transfer Goal Selection (PT) transfer, PT goal 1  -JHON     Gait Training Goal Selection (PT) gait training, PT goal 1  -JHON     ROM Goal Selection (PT) ROM, PT goal 1  -JHON     Strength Goal Selection (PT) strength, PT goal 1  -JHON       Row Name 11/08/24 1500          Transfer Goal 1 (PT)    Activity/Assistive Device (Transfer Goal 1, PT) transfers, all  -JHON     [place] Level/Cues Needed (Transfer Goal 1, PT) independent  -JHON     Time Frame (Transfer Goal 1, PT) long term goal (LTG);10 days  -JHON       Row Name 11/08/24 1500          Gait Training Goal 1 (PT)    Activity/Assistive Device (Gait Training Goal 1, PT) gait (walking locomotion);walker, rolling  -JHON     [place] Level (Gait Training Goal 1, PT) independent  -JHON     Distance (Gait Training Goal 1, PT) 300  -JHON     Time Frame (Gait Training Goal 1, PT) long term goal (LTG);10 days  -JHON       Row Name 11/08/24 1500          ROM Goal 1 (PT)    ROM Goal 1 (PT) Pt will demonstrate knee ROM from 0-110° on the affected side.  -JHON     Time Frame (ROM Goal 1, PT) long-term goal (LTG);10 days  -JHON       Row Name 11/08/24 1500          Strength Goal 1 (PT)    Strength Goal 1 (PT) Pt will demonstrate knee extension strength of 5/5 on the affected side.  -JHON     Time Frame (Strength Goal 1, PT) long term goal (LTG);10 days  -JHON               User Key  (r) = Recorded By, (t) = Taken By, (c) = Cosigned By      Initials Name Provider Type    JHON Tiago Lopez, PT Physical Therapist    Alexandrea Koo, RN Registered Nurse                    Physical Therapy Education       Title: PT OT SLP Therapies (Done)       Topic: Physical Therapy (Done)       Point: Mobility training (Done)       Learning  Progress Summary            Patient Acceptance, E,TB, VU by JHON at 11/8/2024 1516                      Point: Precautions (Done)       Learning Progress Summary            Patient Acceptance, E,TB, VU by JHON at 11/8/2024 1516                                      User Key       Initials Effective Dates Name Provider Type Discipline    JHON 06/03/21 -  Tiago Lopez PT Physical Therapist PT                  PT Recommendation and Plan  Anticipated Discharge Disposition (PT): home with outpatient therapy services  Planned Therapy Interventions (PT): balance training, bed mobility training, gait training, home exercise program, stair training, ROM (range of motion), strengthening, stretching, transfer training  Therapy Frequency (PT): 2 times/day  Plan of Care Reviewed With: patient  Outcome Evaluation: Pt presents with decreased ROM, strength, transfers and ambulation.  Skilled PT services will be required to address these mobility deficits.   Outcome Measures       Row Name 11/08/24 1500             How much help from another person do you currently need...    Turning from your back to your side while in flat bed without using bedrails? 3  -JHON      Moving from lying on back to sitting on the side of a flat bed without bedrails? 3  -JHON      Moving to and from a bed to a chair (including a wheelchair)? 3  -JHON      Standing up from a chair using your arms (e.g., wheelchair, bedside chair)? 3  -JHON      Climbing 3-5 steps with a railing? 3  -JHON      To walk in hospital room? 3  -JHON      AM-PAC 6 Clicks Score (PT) 18  -JHON         Functional Assessment    Outcome Measure Options AM-PAC 6 Clicks Basic Mobility (PT)  -JHON                User Key  (r) = Recorded By, (t) = Taken By, (c) = Cosigned By      Initials Name Provider Type    JHON Tiago Lopez PT Physical Therapist                     Time Calculation:    PT Charges       Row Name 11/08/24 1514             Time Calculation    PT Received On 11/08/24  -JHON      PT Goal  Re-Cert Due Date 11/17/24  -JHON         Untimed Charges    PT Eval/Re-eval Minutes 20  -JHON         Total Minutes    Untimed Charges Total Minutes 20  -JHON       Total Minutes 20  -JHON                User Key  (r) = Recorded By, (t) = Taken By, (c) = Cosigned By      Initials Name Provider Type    Tiago Franklin, PT Physical Therapist                      PT G-Codes  Outcome Measure Options: AM-PAC 6 Clicks Basic Mobility (PT)  AM-PAC 6 Clicks Score (PT): 18    Tiago Lopez, PT  11/8/2024

## 2024-11-08 NOTE — OP NOTE
TOTAL KNEE ARTHROPLASTY  Procedure Report    Patient Name:  Ricky W Sturgeon  YOB: 1955    Date of Surgery:  11/8/2024     Indications:  Severe OA, failed conservative treatment    Pre-op Diagnosis:   Left knee pain, unspecified chronicity [M25.562]       Post-Op Diagnosis Codes:     * Left knee pain, unspecified chronicity [M25.562]    Procedure/CPT® Codes:      Procedure(s):  LEFT TOTAL KNEE ARTHROPLASTY: Replace left knee with artificial implants    Surgical Approach: Knee Medial Parapatellar        Staff:  Surgeon(s):  Rita Calix MD    Assistant: Umu aHys RN; Gisela Troncoso    Anesthesia: Choice    Estimated Blood Loss: 125 mL    Implants:    Implant Name Type Inv. Item Serial No.  Lot No. LRB No. Used Action   CMT BONE PALACOS R HI/VISC 1X40 - [id number] Implant CMT BONE PALACOS R HI/VISC 1X40  [facility] [id number] Left 2 Implanted   TRY TIB CRUC 75MM - [id number] Implant TRY TIB CRUC 75MM  [employer] [id number] Left 1 Implanted   COMP FEM/KN VANGUARD INTLK CR 70MM NS LT - [id number] Implant COMP FEM/KN VANGUARD INTLK CR 70MM NS LT  [employer] [id number] Left 1 Implanted   PAT 3PEG STD 8X31MM - [id number] Implant PAT 3PEG STD 8X31MM  [employer] [id number] Left 1 Implanted   BEAR TIB/KN VANGUARD AS [id number] NS - [id number] Implant BEAR TIB/KN VANGUARD AS [id number] NS  [employer] [id number] Left 1 Implanted       Specimen:          None        Findings: advance OA    Complications: None    Description of Procedure: The patient was brought to the operating room and underwent general anesthesia, had a preoperative antibiotic, and was then prepped and draped in sterile fashion. An Esmarch was used to exsanguinate the limb. The tourniquet was then inflated. A standard medial parapatellar arthrotomy was performed. An intramedullary guide was placed in the femur. A distal femoral cut was made and measured. The 4-in-1 block was placed. Excellent cuts were achieved. Bone was  removed, followed by removal of the ACL and remaining menisci. The intramedullary guide was placed in the tibia. The tibia was then cut and measured. This was then broached. The patella was then osteotomized, removing approximately 8-10 mm of bone. It measured. There was excellent range of motion, tracking and stability of the trials. The trials were removed. Bony cut surfaces were thoroughly irrigated. The knee was then injected. The cement was vacuum mixed, placed on the undersurface of the components and then packed into place. Excess cement was removed. Once this had hardened, trial polys were tried and the appropriate sized anterior insert was then chosen. This was then locked, thoroughly irrigated. Bovie electrocautery was used for hemostasis. The tourniquet was deflated. This was again thoroughly irrigated and closed using #1 Vicryl, 2-0 Vicryl and staples. A sterile dressing was applied. The patient was then extubated and taken to the recovery room in stable condition. There were no complications.    Assistant: Umu Hays RN; Gisela Troncoso  was responsible for performing the following activities: Retraction, Suction, Irrigation, Closing, and Placing Dressing and their skilled assistance was necessary for the success of this case.    Rita Calix MD     Date: 11/8/2024  Time: 13:59 EST

## 2024-11-08 NOTE — ANESTHESIA POSTPROCEDURE EVALUATION
Patient: Ricky W Sturgeon    Procedure Summary       Date: 11/08/24 Room / Location: [facility] OR 06 / [facility] MAIN OR    Anesthesia Start: 1209 Anesthesia Stop: 1359    Procedure: LEFT TOTAL KNEE ARTHROPLASTY: Replace left knee with artificial implants (Left: Knee) Diagnosis:       Left knee pain, unspecified chronicity      (Left knee pain, unspecified chronicity [M25.562])    Surgeons: Rita Calix MD Provider: Chandrakant Self MD    Anesthesia Type: general with block ASA Status: 3            Anesthesia Type: general with block    Vitals  Vitals Value Taken Time   /55 11/08/24 1500   Temp 36.4 °C (97.5 °F) 11/08/24 1455   Pulse 69 11/08/24 1500   Resp 15 11/08/24 1500   SpO2 95 % 11/08/24 1459   Vitals shown include unfiled device data.      Post Anesthesia Care and Evaluation    Patient location during evaluation: bedside  Patient participation: complete - patient participated  Level of consciousness: awake    Airway patency: patent  PONV Status: none  Cardiovascular status: acceptable  Respiratory status: acceptable  Hydration status: acceptable

## 2024-11-08 NOTE — SIGNIFICANT NOTE
11/08/24 1530   Plan   Final Discharge Disposition Code 01 - home or self-care   Final Note Legdorys Silveira. Appt:11/11/24 at 2PM CST.

## 2024-11-08 NOTE — CONSULTS
[facility]   Hospitalist Consult Note  Date: 2024   Patient Name: Ricky W Sturgeon  : 1955  MRN: [id number]  Primary Care Physician:  Martha Suresh APRN  Referring Physician: Rita Calix MD  Date of admission: 2024    Subjective   Subjective     Reason for Consult/ Chief Complaint: Medical managemen    HPI:  Ricky W Sturgeon is a 69 y.o. male past medical history significant for coronary artery disease status post PCI, hypertension, hyperlipidemia, diabetes type 2, diastolic heart failure, GERD, history of seizures not currently on medications, history of TIA, BPH, osteoarthritis is been suffering from knee pain for some time is failed to respond to conservative medical management subsequently undergone total knee arthroplasty we have been consulted postoperatively for management of his multiple medical issues.      Personal History     Past Medical History:  Past Medical History:   Diagnosis Date    Allergic rhinitis     Arthritis     Bulging lumbar disc     Chest pain     Chronic back pain     Congestive heart failure (CHF)     DM (diabetes mellitus)     type 2    Dysphagia     would get choked easily after starting protonix.  Resolved    Elevated cholesterol     Follow-up examination following surgery 10/09/2014    Gastroesophageal reflux disease     GERD (gastroesophageal reflux disease)     Heart attack     2019    Hemorrhoids, external     High blood pressure     High cholesterol     Lumbago     LOW BACK PAIN    Mood disorder     Prostate disorder     Seizures     last seizure  then taken off medication    Shortness of breath     Sleep apnea     TIA (transient ischemic attack)     unable to do MRI to confirm        Past Surgical History:  Past Surgical History:   Procedure Laterality Date    COLONOSCOPY      COLONOSCOPY N/A 2021    Procedure: COLONOSCOPY WITH POLYPECTOMY;  Surgeon: Jonas Barriga MD;  Location: [facility] ENDOSCOPY;  Service: General;  Laterality:  N/A;  DIVERTICULOSIS, COLON POLYPS    CORONARY ANGIOPLASTY WITH STENT PLACEMENT  2019    ENDOSCOPY      ENDOSCOPY N/A 07/28/2021    Procedure: ESOPHAGOGASTRODUODENOSCOPY WITH BIOPSIES;  Surgeon: Jonas Barriga MD;  Location: [facility] ENDOSCOPY;  Service: General;  Laterality: N/A;  GASTRITIS    HEEL SPUR SURGERY Bilateral     HEEL SPURS REMOVED FROM BOTH HEELS    INGUINAL HERNIA REPAIR Left     PACEMAKER IMPLANTATION  2019    SHOULDER SURGERY Bilateral          Family History:   Family History   Problem Relation Age of Onset    Colon cancer Mother         70S    Diabetes Mother     Colon cancer Father         70S    Stroke Other     Cancer Other     Esophageal cancer Brother     Malig Hyperthermia Neg Hx         Social History:   Social History     Socioeconomic History    Marital status:    Tobacco Use    Smoking status: Never    Smokeless tobacco: Never   Vaping Use    Vaping status: Never Used   Substance and Sexual Activity    Alcohol use: Not Currently    Drug use: Never    Sexual activity: Defer        Home Medications:  Cyanocobalamin, HYDROcodone-acetaminophen, atorvastatin, clopidogrel, empagliflozin, escitalopram, furosemide, metFORMIN ER, metoprolol succinate XL, pantoprazole, polyethylene glycol, and tamsulosin    Allergies:  Allergies   Allergen Reactions    Sulfa Antibiotics Angioedema    Latex, Natural Rubber Nausea Only       Review of Systems   All systems were reviewed and negative except for: Weakness fatigue    Objective    Objective     Vitals:   Temp:  [97.3 °F (36.3 °C)-99.8 °F (37.7 °C)] 98.6 °F (37 °C)  Heart Rate:  [60-80] 66  Resp:  [11-20] 16  BP: ()/(55-84) 94/66  Flow (L/min) (Oxygen Therapy):  [3] 3    Physical Exam:  Constitutional: Awake alert  Respiratory: Clear  Cardiovascular RRR  GI: Abdomen soft nontender  Extremities neurovascularly intact  Result Review    Result Review:  I have personally reviewed the results from the time of this admission to 11/8/2024 17:57 EST  and agree with these findings:  [x]  Laboratory  []  Microbiology  []  Radiology  []  EKG/Telemetry   []  Cardiology/Vascular   []  Pathology  [x]  Old records  []  Other:    Assessment & Plan   Assessment / Plan     Assessment:    Coronary artery disease status post PCI  Hypertension  Hyperlipidemia  Diabetes type 2  GERD  BPH  Osteoarthritis  Status post total knee arthroplasty    Plan:    Patient with history of diabetes we will start sliding scale insulin per protocol keep on diabetic diet  Patient with history of CAD hypertension we will resume beta-blocker to prevent rebound tachycardia  Has history of CAD normally on Plavix resume when okay from surgical standpoint likely after Eliquis  Hold diuretics in the setting of recent surgery give gentle IV fluids overnight  Start Protonix for GERD and GI prophylaxis  Resume Flomax for BPH  Resume Lipitor given history of hyperlipidemia  IV and oral analgesics  CBC chemistries in a.m.  PT OT  Further treatment contingent upon his hospital course    VTE Prophylaxis:  Pharmacologic & mechanical VTE prophylaxis orders are present.        CODE STATUS:         Electronically signed by MARSHA Sterling, 11/08/24, 5:57 PM EST.    Attending Note:  I have seen and examined the patient and agree with the above information from Sai Mejía PA-C.  Electronically signed by Barney Choudhary MD, 11/08/24, 7:13 PM EST.

## 2024-11-08 NOTE — PLAN OF CARE
Goal Outcome Evaluation:              Outcome Evaluation: Patient was admitted to the floor this afternoon after having a L total knee replacement arthroplasty today. He is alert and oriented, able to make needs known. On 2L NC with ETCO2 monitor. Patient is an assist x1, and has walked approximately 15 feet this shift. He has not voided yet. His pain is controlled, he has only received scheduled toradol this shift. He was last medicated with narcotics in PACU. His expected discharge date is tomorrow. His DME needs include a walker. He will be going home with outpatient rehab at the [facility] in [place], he stated his first appointment is Monday (11/11). His wife will be his ride home. He would like to use meds to bed at discharge.     Regla Goncalves RN

## 2024-11-08 NOTE — ANESTHESIA PROCEDURE NOTES
Peripheral Block      Patient reassessed immediately prior to procedure    Patient location during procedure: pre-op  Reason for block: at surgeon's request and post-op pain management  Preanesthetic Checklist  Completed: patient identified, IV checked, site marked, risks and benefits discussed, surgical consent, monitors and equipment checked, pre-op evaluation and timeout performed  Prep:  Pt Position: supine  Sterile barriers:alcohol skin prep, partial drape, cap, washed/disinfected hands, mask and gloves  Prep: ChloraPrep  Patient monitoring: blood pressure monitoring, continuous pulse oximetry and EKG  Procedure    Sedation: yes  Performed under: local infiltration  Guidance:ultrasound guided and nerve stimulator    ULTRASOUND INTERPRETATION.  Using ultrasound guidance a 20 G gauge needle was placed in close proximity to the nerve, at which point, under ultrasound guidance anesthetic was injected in the area of the nerve and spread of the anesthesia was seen on ultrasound in close proximity thereto.  There were no abnormalities seen on ultrasound; a digital image was taken; and the patient tolerated the procedure with no complications. Images:still images obtained, printed/placed on chart    Block Type:adductor canal block  Injection Technique:single-shot  Needle Type:echogenic  Needle Gauge:20 G (4in)  Resistance on Injection: none          Post Assessment  Injection Assessment: negative aspiration for heme, no paresthesia on injection and incremental injection  Patient Tolerance:comfortable throughout block  Complications:no  Additional Notes  The block or continuous infusion is requested by the referring physician for management of postoperative pain, or pain related to a procedure. Ultrasound guidance (deemed medically necessary). Painless injection, pt was awake and conversant during the procedure without complications. Needle and surrounding structures visualized throughout procedure. No adverse reactions  or complications seen during this period. Post-procedure image showed no signs of complication, and anatomy was consistent with an uncomplicated nerve blockade.  Performed by: Lewis Madden MD

## 2024-11-08 NOTE — ANESTHESIA PROCEDURE NOTES
Peripheral Block    Pre-sedation assessment completed: 11/8/2024 11:15 AM    Patient reassessed immediately prior to procedure    Patient location during procedure: pre-op  Start time: 11/8/2024 11:17 AM  Stop time: 11/8/2024 11:21 AM  Reason for block: at surgeon's request and post-op pain management  Performed by  Anesthesiologist: Chandrakant Self MD  Preanesthetic Checklist  Completed: patient identified, IV checked, site marked, risks and benefits discussed, surgical consent, monitors and equipment checked, pre-op evaluation and timeout performed  Prep:  Pt Position: supine  Sterile barriers:alcohol skin prep, partial drape, cap, washed/disinfected hands, mask and gloves  Prep: ChloraPrep  Patient monitoring: blood pressure monitoring, continuous pulse oximetry and EKG  Procedure    Sedation: yes  Performed under: local infiltration  Guidance:ultrasound guided and nerve stimulator    ULTRASOUND INTERPRETATION.  Using ultrasound guidance a 20 G gauge needle was placed in close proximity to the nerve, at which point, under ultrasound guidance anesthetic was injected in the area of the nerve and spread of the anesthesia was seen on ultrasound in close proximity thereto.  There were no abnormalities seen on ultrasound; a digital image was taken; and the patient tolerated the procedure with no complications. Images:still images obtained, printed/placed on chart    Laterality:left  Block Type:adductor canal block  Injection Technique:single-shot  Needle Type:echogenic  Needle Gauge:20 G (4in)  Resistance on Injection: none    Medications Used: ropivacaine (NAROPIN) 0.2 % injection - Injection   30 mL - 11/8/2024 11:21:00 AM      Post Assessment  Injection Assessment: negative aspiration for heme, no paresthesia on injection and incremental injection  Patient Tolerance:comfortable throughout block  Complications:no  Additional Notes  The block or continuous infusion is requested by the referring physician for  management of postoperative pain, or pain related to a procedure. Ultrasound guidance (deemed medically necessary). Painless injection, pt was awake and conversant during the procedure without complications. Needle and surrounding structures visualized throughout procedure. No adverse reactions or complications seen during this period. Post-procedure image showed no signs of complication, and anatomy was consistent with an uncomplicated nerve blockade.  Performed by: Chandrakant Self MD

## 2024-11-09 VITALS
SYSTOLIC BLOOD PRESSURE: 106 MMHG | BODY MASS INDEX: 32.59 KG/M2 | HEIGHT: 69 IN | RESPIRATION RATE: 18 BRPM | DIASTOLIC BLOOD PRESSURE: 74 MMHG | HEART RATE: 66 BPM | TEMPERATURE: 98.1 F | WEIGHT: 220.02 LBS | OXYGEN SATURATION: 95 %

## 2024-11-09 LAB
ALBUMIN SERPL-MCNC: 3.4 G/DL (ref 3.5–5.2)
ANION GAP SERPL CALCULATED.3IONS-SCNC: 7.5 MMOL/L (ref 5–15)
BUN SERPL-MCNC: 23 MG/DL (ref 8–23)
BUN/CREAT SERPL: 22.5 (ref 7–25)
CALCIUM SPEC-SCNC: 8.3 MG/DL (ref 8.6–10.5)
CHLORIDE SERPL-SCNC: 105 MMOL/L (ref 98–107)
CO2 SERPL-SCNC: 24.5 MMOL/L (ref 22–29)
CREAT SERPL-MCNC: 1.02 MG/DL (ref 0.76–1.27)
DEPRECATED RDW RBC AUTO: 44.2 FL (ref 37–54)
EGFRCR SERPLBLD CKD-EPI 2021: 80.1 ML/MIN/1.73
ERYTHROCYTE [DISTWIDTH] IN BLOOD BY AUTOMATED COUNT: 12.4 % (ref 12.3–15.4)
GLUCOSE BLDC GLUCOMTR-MCNC: 132 MG/DL (ref 70–99)
GLUCOSE BLDC GLUCOMTR-MCNC: 168 MG/DL (ref 70–99)
GLUCOSE SERPL-MCNC: 192 MG/DL (ref 65–99)
HCT VFR BLD AUTO: 31.8 % (ref 37.5–51)
HGB BLD-MCNC: 10.3 G/DL (ref 13–17.7)
MCH RBC QN AUTO: 31.5 PG (ref 26.6–33)
MCHC RBC AUTO-ENTMCNC: 32.4 G/DL (ref 31.5–35.7)
MCV RBC AUTO: 97.2 FL (ref 79–97)
PHOSPHATE SERPL-MCNC: 4.1 MG/DL (ref 2.5–4.5)
PLATELET # BLD AUTO: 216 10*3/MM3 (ref 140–450)
PMV BLD AUTO: 10.1 FL (ref 6–12)
POTASSIUM SERPL-SCNC: 4 MMOL/L (ref 3.5–5.2)
RBC # BLD AUTO: 3.27 10*6/MM3 (ref 4.14–5.8)
SODIUM SERPL-SCNC: 137 MMOL/L (ref 136–145)
WBC NRBC COR # BLD AUTO: 10.04 10*3/MM3 (ref 3.4–10.8)

## 2024-11-09 PROCEDURE — 25010000002 ENOXAPARIN PER 10 MG: Performed by: ORTHOPAEDIC SURGERY

## 2024-11-09 PROCEDURE — 25010000002 CEFAZOLIN PER 500 MG: Performed by: ORTHOPAEDIC SURGERY

## 2024-11-09 PROCEDURE — 97150 GROUP THERAPEUTIC PROCEDURES: CPT

## 2024-11-09 PROCEDURE — 82948 REAGENT STRIP/BLOOD GLUCOSE: CPT | Performed by: PHYSICIAN ASSISTANT

## 2024-11-09 PROCEDURE — 85027 COMPLETE CBC AUTOMATED: CPT | Performed by: ORTHOPAEDIC SURGERY

## 2024-11-09 PROCEDURE — 97116 GAIT TRAINING THERAPY: CPT

## 2024-11-09 PROCEDURE — 80069 RENAL FUNCTION PANEL: CPT | Performed by: PHYSICIAN ASSISTANT

## 2024-11-09 PROCEDURE — 97535 SELF CARE MNGMENT TRAINING: CPT

## 2024-11-09 PROCEDURE — 97165 OT EVAL LOW COMPLEX 30 MIN: CPT

## 2024-11-09 PROCEDURE — 25010000002 KETOROLAC TROMETHAMINE PER 15 MG: Performed by: ORTHOPAEDIC SURGERY

## 2024-11-09 RX ORDER — HYDROCODONE BITARTRATE AND ACETAMINOPHEN 7.5; 325 MG/1; MG/1
1-2 TABLET ORAL EVERY 4 HOURS PRN
Qty: 40 TABLET | Refills: 0 | Status: SHIPPED | OUTPATIENT
Start: 2024-11-09 | End: 2024-11-15 | Stop reason: SDUPTHER

## 2024-11-09 RX ORDER — METOPROLOL SUCCINATE 25 MG/1
25 TABLET, EXTENDED RELEASE ORAL DAILY
Status: DISCONTINUED | OUTPATIENT
Start: 2024-11-09 | End: 2024-11-09 | Stop reason: HOSPADM

## 2024-11-09 RX ADMIN — PANTOPRAZOLE SODIUM 40 MG: 40 TABLET, DELAYED RELEASE ORAL at 08:01

## 2024-11-09 RX ADMIN — SENNOSIDES AND DOCUSATE SODIUM 2 TABLET: 50; 8.6 TABLET ORAL at 08:01

## 2024-11-09 RX ADMIN — TAMSULOSIN HYDROCHLORIDE 0.4 MG: 0.4 CAPSULE ORAL at 08:01

## 2024-11-09 RX ADMIN — ESCITALOPRAM OXALATE 20 MG: 10 TABLET ORAL at 08:01

## 2024-11-09 RX ADMIN — SODIUM CHLORIDE 2000 MG: 9 INJECTION, SOLUTION INTRAVENOUS at 04:26

## 2024-11-09 RX ADMIN — KETOROLAC TROMETHAMINE 15 MG: 30 INJECTION, SOLUTION INTRAMUSCULAR; INTRAVENOUS at 00:00

## 2024-11-09 RX ADMIN — FERROUS SULFATE TAB 325 MG (65 MG ELEMENTAL FE) 325 MG: 325 (65 FE) TAB at 08:01

## 2024-11-09 RX ADMIN — METOPROLOL SUCCINATE 25 MG: 25 TABLET, EXTENDED RELEASE ORAL at 08:01

## 2024-11-09 RX ADMIN — KETOROLAC TROMETHAMINE 15 MG: 30 INJECTION, SOLUTION INTRAMUSCULAR; INTRAVENOUS at 05:25

## 2024-11-09 RX ADMIN — ENOXAPARIN SODIUM 40 MG: 100 INJECTION SUBCUTANEOUS at 08:00

## 2024-11-09 RX ADMIN — HYDROCODONE BITARTRATE AND ACETAMINOPHEN 2 TABLET: 7.5; 325 TABLET ORAL at 07:56

## 2024-11-09 NOTE — PROGRESS NOTES
[facility]   Hospitalist Progress Note  Date: 2024  Patient Name: Ricky W Sturgeon  : 1955  MRN: [id number]  Date of admission: 2024      Subjective   Subjective     Chief Complaint: Medical management    Summary: Ricky W Sturgeon is a 69 y.o. male past medical history significant for coronary artery disease status post PCI, hypertension, hyperlipidemia, diabetes type 2, diastolic heart failure, GERD, history of seizures not currently on medications, history of TIA, BPH, osteoarthritis is been suffering from knee pain for some time is failed to respond to conservative medical management subsequently undergone total knee arthroplasty we have been consulted postoperatively for management of his multiple medical issues.     Interval Followup: Seen and examined doing well postoperative day 1 started on DVT prophylaxis discontinue IV fluids continue pain control continue PT and OT okay to discharge from medical standpoint      Objective   Objective     Vitals:   Temp:  [97.2 °F (36.2 °C)-99.8 °F (37.7 °C)] 98.2 °F (36.8 °C)  Heart Rate:  [60-80] 60  Resp:  [11-18] 18  BP: ()/(55-81) 109/68  Flow (L/min) (Oxygen Therapy):  [2-3] 2    Physical Exam    Constitutional: Awake, alert, no acute distress   Eyes: Pupils equal, sclerae anicteric, no conjunctival injection   HENT: NCAT, mucous membranes moist   Neck: Supple, no thyromegaly, no lymphadenopathy, trachea midline   Respiratory: Clear to auscultation bilaterally, nonlabored respirations    Cardiovascular: RRR, no murmurs, rubs, or gallops, palpable pedal pulses bilaterally   Gastrointestinal: Positive bowel sounds, soft, nontender, nondistended   Musculoskeletal: No bilateral ankle edema, no clubbing or cyanosis to extremities   Psychiatric: Appropriate affect, cooperative   Neurologic: Oriented x 3, strength symmetric in all extremities, Cranial Nerves grossly intact to confrontation, speech clear   Skin: No rashes     Result Review     Result Review:  I have personally reviewed the results from the time of this admission to 11/9/2024 10:17 EST and agree with these findings:  [x]  Laboratory  []  Microbiology  []  Radiology  []  EKG/Telemetry   []  Cardiology/Vascular   []  Pathology  []  Old records  []  Other:    Assessment & Plan   Assessment / Plan   Assessment:     Coronary artery disease status post PCI  Hypertension  Hyperlipidemia  Diabetes type 2  GERD  BPH  Osteoarthritis  Status post total knee arthroplasty     Plan:     DC IV fluids  Resume home medications  Continue analgesics for pain control  Zofran for nausea  PT OT  Okay to discharge home from medical standpoint     Discussed plan with RN.    VTE Prophylaxis:  Pharmacologic & mechanical VTE prophylaxis orders are present.        CODE STATUS:   Code Status (Patient has no pulse and is not breathing): CPR (Attempt to Resuscitate)  Medical Interventions (Patient has pulse or is breathing): Full Support        Electronically signed by MARSHA Sterling, 11/09/24, 10:17 AM EST.

## 2024-11-09 NOTE — THERAPY TREATMENT NOTE
Acute Care - Physical Therapy Treatment Note  [facility]     Patient Name: Ricky W Sturgeon  : 1955  MRN: [id number]  Today's Date: 2024      Visit Dx:     ICD-10-CM ICD-9-CM   1. Difficulty in walking  R26.2 719.7   2. Left knee pain, unspecified chronicity  M25.562 719.46   3. Impaired mobility and ADLs  Z74.09 V49.89    Z78.9      Patient Active Problem List   Diagnosis    Allergic rhinitis    Arthritis    Carotid atherosclerosis, bilateral    Chest pain    Chronic back pain    Low back pain    Congestive heart failure    Coronary artery disease    Dysphagia    Esophageal reflux    Heart attack    Hemorrhoids, external    High blood pressure    High cholesterol    Mood disorder    Other and unspecified disc disorder    Prostate disorder    Shortness of breath    Stroke (cerebrum)    Stroke-like symptoms    Surgery follow-up examination    Cervical radiculopathy    DDD (degenerative disc disease), cervical    Lumbar radiculopathy    Weakness of both lower extremities    Brain fog    Chest pain, atypical    OA (osteoarthritis) of knee    Left knee pain     Past Medical History:   Diagnosis Date    Allergic rhinitis     Arthritis     Bulging lumbar disc     Chest pain     Chronic back pain     Congestive heart failure (CHF)     DM (diabetes mellitus)     type 2    Dysphagia     would get choked easily after starting protonix.  Resolved    Elevated cholesterol     Follow-up examination following surgery 10/09/2014    Gastroesophageal reflux disease     GERD (gastroesophageal reflux disease)     Heart attack     2019    Hemorrhoids, external     High blood pressure     High cholesterol     Lumbago     LOW BACK PAIN    Mood disorder     Prostate disorder     Seizures     last seizure  then taken off medication    Shortness of breath     Sleep apnea     TIA (transient ischemic attack)     unable to do MRI to confirm     Past Surgical History:   Procedure Laterality Date    COLONOSCOPY       COLONOSCOPY N/A 07/28/2021    Procedure: COLONOSCOPY WITH POLYPECTOMY;  Surgeon: Jonas Barriga MD;  Location: [facility] ENDOSCOPY;  Service: General;  Laterality: N/A;  DIVERTICULOSIS, COLON POLYPS    CORONARY ANGIOPLASTY WITH STENT PLACEMENT  2019    ENDOSCOPY      ENDOSCOPY N/A 07/28/2021    Procedure: ESOPHAGOGASTRODUODENOSCOPY WITH BIOPSIES;  Surgeon: Jonas Barriga MD;  Location: [facility] ENDOSCOPY;  Service: General;  Laterality: N/A;  GASTRITIS    HEEL SPUR SURGERY Bilateral     HEEL SPURS REMOVED FROM BOTH HEELS    INGUINAL HERNIA REPAIR Left     PACEMAKER IMPLANTATION  2019    SHOULDER SURGERY Bilateral      PT Assessment (Last 12 Hours)       PT Evaluation and Treatment       Row Name 11/09/24 1107          Physical Therapy Time and Intention    Subjective Information complains of;pain  -VK     Document Type therapy note (daily note)  -VK     Mode of Treatment individual therapy;group therapy;physical therapy  -VK     Patient Effort good  -VK     Comment Gait performed individually; therapuetic exercises performed in a group session with 5 participants  -VK       Row Name 11/09/24 1107          General Information    Patient Profile Reviewed yes  -VK     Patient Observations alert;cooperative;agree to therapy  -VK     Existing Precautions/Restrictions fall;weight bearing  -VK       Row Name 11/09/24 1107          Pain    Pain Location knee  -VK     Pain Side/Orientation left  -VK     Pain Management Interventions exercise or physical activity utilized;cold applied  -VK       Row Name 11/09/24 1107          Cognition    Affect/Mental Status (Cognition) WNL  -VK     Orientation Status (Cognition) oriented x 4  -VK     Personal Safety Interventions fall prevention program maintained;gait belt;nonskid shoes/slippers when out of bed  -VK       Row Name 11/09/24 1107          Mobility    Extremity Weight-bearing Status left lower extremity  -VK     Left Lower Extremity (Weight-bearing Status) weight-bearing as  tolerated (WBAT)  -VK       Row Name 11/09/24 1107          Transfers    Transfers sit-stand transfer;stand-sit transfer;car transfer  -VK       Row Name 11/09/24 1107          Sit-Stand Transfer    Sit-Stand [place] (Transfers) standby assist;contact guard  -VK     Assistive Device (Sit-Stand Transfers) walker, front-wheeled  -VK       Row Name 11/09/24 1107          Stand-Sit Transfer    Stand-Sit [place] (Transfers) standby assist  -VK     Assistive Device (Stand-Sit Transfers) walker, front-wheeled  -VK       Row Name 11/09/24 1107          Car Transfer    Type (Car Transfer) sit-stand;stand-sit  -VK     [place] Level (Car Transfer) standby assist;verbal cues  -VK     Assistive Device (Car Transfer) walker, front-wheeled  -VK       Row Name 11/09/24 1107          Gait/Stairs (Locomotion)    Gait/Stairs Locomotion gait/ambulation assistive device  -VK     [place] Level (Gait) contact guard  -VK     Assistive Device (Gait) walker, front-wheeled  -VK     Patient was able to Ambulate yes  -VK     Distance in Feet (Gait) --  105ft, 105ft  -VK     Pattern (Gait) step-to  -VK     Deviations/Abnormal Patterns (Gait) antalgic;donald decreased;gait speed decreased;stride length decreased  -VK     Left Sided Gait Deviations weight shift ability decreased  -VK     Maintains Weight-bearing Status (Gait) able to maintain  -VK     [place] Level (Stairs) contact guard;verbal cues  -VK     Assistive Device (Stairs) walker, front-wheeled  -VK     Handrail Location (Stairs) both sides  -VK     Number of Steps (Stairs) 5  -VK     Ascending Technique (Stairs) step-to-step  -VK     Descending Technique (Stairs) step-to-step  -VK     Stairs, Safety Issues sequencing ability decreased;weight-shifting ability decreased  -VK       Row Name 11/09/24 1107          Safety Issues/Impairments Affecting Functional Mobility    Impairments Affecting Function (Mobility) balance;pain;range of motion (ROM);strength  -VK        Row Name 11/09/24 1107          Balance    Balance Assessment standing dynamic balance  -     Dynamic Standing Balance standby assist;contact guard  -     Position/Device Used, Standing Balance walker, front-wheeled  -       Row Name 11/09/24 1107          Motor Skills    Therapeutic Exercise hip;knee;ankle  -       Row Name 11/09/24 1107          Hip (Therapeutic Exercise)    Hip (Therapeutic Exercise) isometric exercises  -     Hip Isometrics (Therapeutic Exercise) left;gluteal sets;10 repetitions;2 sets  -       Row Name 11/09/24 1107          Knee (Therapeutic Exercise)    Knee (Therapeutic Exercise) strengthening exercise;isometric exercises  -     Knee Isometrics (Therapeutic Exercise) left;quad sets;10 repetitions;2 sets  -     Knee Strengthening (Therapeutic Exercise) left;heel slides;SLR (straight leg raise);SAQ (short arc quad);LAQ (long arc quad);10 repetitions;2 sets  -       Row Name 11/09/24 1107          Ankle (Therapeutic Exercise)    Ankle (Therapeutic Exercise) AROM (active range of motion)  -     Ankle AROM (Therapeutic Exercise) left;dorsiflexion;plantarflexion;10 repetitions;2 sets  -       Row Name             Wound 11/08/24 1242 Left anterior knee    Wound - Properties Group Placement Date: 11/08/24  - Placement Time: 1242  -EH Side: Left  -EH Orientation: anterior  -EH Location: knee  -EH Primary Wound Type: Incision  -EH    Retired Wound - Properties Group Placement Date: 11/08/24  - Placement Time: 1242  -EH Side: Left  -EH Orientation: anterior  -EH Location: knee  -EH Primary Wound Type: Incision  -EH    Retired Wound - Properties Group Placement Date: 11/08/24  - Placement Time: 1242  -EH Side: Left  -EH Orientation: anterior  -EH Location: knee  -EH Primary Wound Type: Incision  -EH    Retired Wound - Properties Group Date first assessed: 11/08/24  - Time first assessed: 1242  -EH Side: Left  -EH Location: knee  -EH Primary Wound Type: Incision  -EH       Row Name 11/09/24 1107          Positioning and Restraints    Pre-Treatment Position sitting in chair/recliner  -VK     Post Treatment Position chair  -VK     In Chair reclined;call light within reach;encouraged to call for assist;exit alarm on;with family/caregiver  -VK       Row Name 11/09/24 1107          Progress Summary (PT)    Progress Toward Functional Goals (PT) progress toward functional goals is good  -VK     Daily Progress Summary (PT) Pt is progressing well with their exercise program.  Will continue current plan of care.  -VK               User Key  (r) = Recorded By, (t) = Taken By, (c) = Cosigned By      Initials Name Provider Type    VK Jessica Laughlin PTA Physical Therapist Assistant    Alexandrea Koo RN Registered Nurse                    Physical Therapy Education       Title: PT OT SLP Therapies (Resolved)       Topic: Physical Therapy (Resolved)       Point: Mobility training (Resolved)       Learning Progress Summary            Patient Acceptance, E,TB,D, VU,DU by  at 11/9/2024 0751    Acceptance, E,TB, VU by JHON at 11/8/2024 1516                      Point: Precautions (Resolved)       Learning Progress Summary            Patient Acceptance, E,TB,D, VU,DU by  at 11/9/2024 0751    Acceptance, E,TB, VU by JHON at 11/8/2024 1516                                      User Key       Initials Effective Dates Name Provider Type Discipline    AC 06/16/21 -  Alyssa Burroughs OT Occupational Therapist OT    JHON 06/03/21 -  Tiago Lopez, PT Physical Therapist PT                  PT Recommendation and Plan     Progress Summary (PT)  Progress Toward Functional Goals (PT): progress toward functional goals is good  Daily Progress Summary (PT): Pt is progressing well with their exercise program.  Will continue current plan of care.   Outcome Measures       Row Name 11/09/24 1100 11/08/24 1500          How much help from another person do you currently need...    Turning from your back to your  side while in flat bed without using bedrails? 4  -VK 3  -JHON     Moving from lying on back to sitting on the side of a flat bed without bedrails? 4  -VK 3  -JHON     Moving to and from a bed to a chair (including a wheelchair)? 3  -VK 3  -JHON     Standing up from a chair using your arms (e.g., wheelchair, bedside chair)? 3  -VK 3  -JHON     Climbing 3-5 steps with a railing? 3  -VK 3  -JHON     To walk in hospital room? 3  -VK 3  -JHON     AM-PAC 6 Clicks Score (PT) 20  -VK 18  -JHON        Functional Assessment    Outcome Measure Options -- AM-PAC 6 Clicks Basic Mobility (PT)  -JHON               User Key  (r) = Recorded By, (t) = Taken By, (c) = Cosigned By      Initials Name Provider Type    Tiago Franklin, PT Physical Therapist    VK Jessica Laughlin PTA Physical Therapist Assistant                     Time Calculation:    PT Charges       Row Name 11/09/24 1150             Time Calculation    PT Received On 11/09/24  -VK         Timed Charges    07922 - Gait Training Minutes  8  -VK      43410 - PT Therapeutic Activity Minutes 8  -VK         Untimed Charges    PT Group Therapy Minutes 60  -VK         Total Minutes    Timed Charges Total Minutes 16  -VK      Untimed Charges Total Minutes 60  -VK       Total Minutes 76  -VK                User Key  (r) = Recorded By, (t) = Taken By, (c) = Cosigned By      Initials Name Provider Type    Jessica Vargas PTA Physical Therapist Assistant                  Therapy Charges for Today       Code Description Service Date Service Provider Modifiers Qty    62108344782 HC GAIT TRAINING EA 15 MIN 11/9/2024 Jessica Laughlin PTA GP 1    36215238515 HC PT THER PROC GROUP 11/9/2024 Jessica Laughlin PTA GP 1            PT G-Codes  Outcome Measure Options: AM-PAC 6 Clicks Daily Activity (OT), Optimal Instrument  AM-PAC 6 Clicks Score (PT): 20  AM-PAC 6 Clicks Score (OT): 21    Jessica Laughlin PTA  11/9/2024

## 2024-11-09 NOTE — PROGRESS NOTES
Orthopedic Total Knee Progress Note    Assessment/Plan outpatient PT and Jordana, follow-up 2 weeks, DVT prophylaxis    Status post-left total knee arthroplasty: Doing well postoperatively.    Pain Relief: some relief    Continues current post-op course    Activity: up with assistance    Weight Bearing: WBAT     LOS: 0 days     Subjective     Post-Operative Day: 1 post-left total knee arthroplasty  Systemic or Specific Complaints: No Complaints    Objective     Vital signs in last 24 hours:  Vitals:    11/09/24 0110 11/09/24 0354 11/09/24 0426 11/09/24 0712   BP:  108/66  109/68   BP Location:  Left arm  Left arm   Patient Position:  Sitting  Sitting   Pulse:  60  60   Resp:  16  18   Temp:  97.9 °F (36.6 °C)  98.2 °F (36.8 °C)   TempSrc:  Oral  Oral   SpO2: 98% 98% 98% 94%   Weight:       Height:            General: alert, appears stated age, and cooperative   Neurovascular: Tibial nerve: Intact, Superficial peroneal nerve: Intact, and Deep peroneal nerve: Intact  Capillary refill: Normal   Wound: Wound clean and dry no evidence of infection.   Range of Motion: Limited flexsion and Limited extension   DVT Exam: No evidence of DVT seen on physical exam.      WBC   Date Value Ref Range Status   11/09/2024 10.04 3.40 - 10.80 10*3/mm3 Final     RBC   Date Value Ref Range Status   11/09/2024 3.27 (L) 4.14 - 5.80 10*6/mm3 Final     Hemoglobin   Date Value Ref Range Status   11/09/2024 10.3 (L) 13.0 - 17.7 g/dL Final     Hematocrit   Date Value Ref Range Status   11/09/2024 31.8 (L) 37.5 - 51.0 % Final     MCV   Date Value Ref Range Status   11/09/2024 97.2 (H) 79.0 - 97.0 fL Final     MCH   Date Value Ref Range Status   11/09/2024 31.5 26.6 - 33.0 pg Final     MCHC   Date Value Ref Range Status   11/09/2024 32.4 31.5 - 35.7 g/dL Final     RDW   Date Value Ref Range Status   11/09/2024 12.4 12.3 - 15.4 % Final     RDW-SD   Date Value Ref Range Status   11/09/2024 44.2 37.0 - 54.0 fl Final     MPV   Date Value Ref  "Range Status   11/09/2024 10.1 6.0 - 12.0 fL Final     Platelets   Date Value Ref Range Status   11/09/2024 216 140 - 450 10*3/mm3 Final        Basic Metabolic Panel    Sodium Sodium   Date Value Ref Range Status   11/09/2024 137 136 - 145 mmol/L Final      Potassium Potassium   Date Value Ref Range Status   11/09/2024 4.0 3.5 - 5.2 mmol/L Final      Chloride Chloride   Date Value Ref Range Status   11/09/2024 105 98 - 107 mmol/L Final      Bicarbonate No results found for: \"PLASMABICARB\"   BUN BUN   Date Value Ref Range Status   11/09/2024 23 8 - 23 mg/dL Final      Creatinine Creatinine   Date Value Ref Range Status   11/09/2024 1.02 0.76 - 1.27 mg/dL Final      Calcium Calcium   Date Value Ref Range Status   11/09/2024 8.3 (L) 8.6 - 10.5 mg/dL Final      Glucose      No components found for: \"GLUCOSE.*\"      XR Knee 1 or 2 View Left   Final Result   Impression:   Expected postoperative changes from total knee arthroplasty with patellar resurfacing         Electronically Signed: Marvin Arboleda MD     11/8/2024 2:41 PM EST     Workstation ID: [id number]           "

## 2024-11-09 NOTE — PLAN OF CARE
Goal Outcome Evaluation:  Plan of Care Reviewed With: patient, spouse        Progress: no change  Outcome Evaluation: Patient presents with balance and endurance limitations that impede his/her ability to perform ADLS. The skills of a therapist are necessary to maximize independence with ADLs.    Anticipated Discharge Disposition (OT): home with assist, home with outpatient therapy services

## 2024-11-09 NOTE — PLAN OF CARE
Goal Outcome Evaluation:  Plan of Care Reviewed With: patient        Progress: no change  Outcome Evaluation: pt. is aao and calls for assistance, he is a one person assist with walker and has ambulated 260 feet this shift.  voiding without difficulty.  pt. has been medicated with scheduled toradol for pain with relief noted.  upon discharge pt. is going home with outpt therapy.

## 2024-11-09 NOTE — PLAN OF CARE
Goal Outcome Evaluation:           Progress: improving  Outcome Evaluation: Patient is here after a left total knee replacement done on 11/8. He is alert and oriented, able to make needs known. He is an assist x1 with a walker and gait belt. He has walked over 200 feet this shift. He is voiding without difficulty. He has been medicated for pain x1 this shift. He will be discharging home today. He will be doing outpatient rehab at the [facility] in [place]. He is waiting on meds to bed to be delivered. His wife is his ride. DME needs include walker.     Regla Goncalves RN

## 2024-11-09 NOTE — THERAPY EVALUATION
Patient Name: Ricky W Sturgeon  : 1955    MRN: [id number]                              Today's Date: 2024       Admit Date: 2024    Visit Dx:     ICD-10-CM ICD-9-CM   1. Difficulty in walking  R26.2 719.7   2. Left knee pain, unspecified chronicity  M25.562 719.46   3. Impaired mobility and ADLs  Z74.09 V49.89    Z78.9      Patient Active Problem List   Diagnosis    Allergic rhinitis    Arthritis    Carotid atherosclerosis, bilateral    Chest pain    Chronic back pain    Low back pain    Congestive heart failure    Coronary artery disease    Dysphagia    Esophageal reflux    Heart attack    Hemorrhoids, external    High blood pressure    High cholesterol    Mood disorder    Other and unspecified disc disorder    Prostate disorder    Shortness of breath    Stroke (cerebrum)    Stroke-like symptoms    Surgery follow-up examination    Cervical radiculopathy    DDD (degenerative disc disease), cervical    Lumbar radiculopathy    Weakness of both lower extremities    Brain fog    Chest pain, atypical    OA (osteoarthritis) of knee    Left knee pain     Past Medical History:   Diagnosis Date    Allergic rhinitis     Arthritis     Bulging lumbar disc     Chest pain     Chronic back pain     Congestive heart failure (CHF)     DM (diabetes mellitus)     type 2    Dysphagia     would get choked easily after starting protonix.  Resolved    Elevated cholesterol     Follow-up examination following surgery 10/09/2014    Gastroesophageal reflux disease     GERD (gastroesophageal reflux disease)     Heart attack     2019    Hemorrhoids, external     High blood pressure     High cholesterol     Lumbago     LOW BACK PAIN    Mood disorder     Prostate disorder     Seizures     last seizure  then taken off medication    Shortness of breath     Sleep apnea     TIA (transient ischemic attack)     unable to do MRI to confirm     Past Surgical History:   Procedure Laterality Date    COLONOSCOPY      COLONOSCOPY  N/A 07/28/2021    Procedure: COLONOSCOPY WITH POLYPECTOMY;  Surgeon: Jonas Barriga MD;  Location: [facility] ENDOSCOPY;  Service: General;  Laterality: N/A;  DIVERTICULOSIS, COLON POLYPS    CORONARY ANGIOPLASTY WITH STENT PLACEMENT  2019    ENDOSCOPY      ENDOSCOPY N/A 07/28/2021    Procedure: ESOPHAGOGASTRODUODENOSCOPY WITH BIOPSIES;  Surgeon: Jonas Barriga MD;  Location: [facility] ENDOSCOPY;  Service: General;  Laterality: N/A;  GASTRITIS    HEEL SPUR SURGERY Bilateral     HEEL SPURS REMOVED FROM BOTH HEELS    INGUINAL HERNIA REPAIR Left     PACEMAKER IMPLANTATION  2019    SHOULDER SURGERY Bilateral       General Information       Row Name 11/09/24 0752 11/09/24 0747       OT Time and Intention    Document Type therapy note (daily note)  -AC evaluation  -AC    Mode of Treatment individual therapy;occupational therapy  -AC individual therapy;occupational therapy  -AC    Patient Effort good  -AC good  -      Row Name 11/09/24 0752 11/09/24 0747       General Information    Patient Profile Reviewed yes  -AC yes  -AC    Prior Level of Function -- --  Patient reports independent with ADLs without an assistive device for functional mobility.  He states he has a tub shower for bathing without seat option and a regular toilet in place.  -AC    Existing Precautions/Restrictions -- fall;weight bearing  -AC    Barriers to Rehab -- none identified  -      Row Name 11/09/24 0747          Occupational Profile    Reason for Services/Referral (Occupational Profile) Pt. is a 69year old male admitted for the above diagnosis status post left total knee replacement on 11/8/2024. Pt. referred to OT services to assess independence with ADLs and adl transfers/fx'l mobility. No previous OT services for current condition.  -       Row Name 11/09/24 0747          Living Environment    People in Home spouse  -       Row Name 11/09/24 0747          Home Main Entrance    Number of Stairs, Main Entrance none  -       Row Name 11/09/24  0752 11/09/24 0747       Cognition    Orientation Status (Cognition) oriented x 3  -AC oriented x 3  -AC      Row Name 11/09/24 0752 11/09/24 0747       Safety Issues/Impairments Affecting Functional Mobility    Impairments Affecting Function (Mobility) balance;endurance/activity tolerance;pain  -AC balance;endurance/activity tolerance;pain  -AC              User Key  (r) = Recorded By, (t) = Taken By, (c) = Cosigned By      Initials Name Provider Type    AC Alyssa Burroughs OT Occupational Therapist                     Mobility/ADL's       Row Name 11/09/24 0752 11/09/24 0747       Bed Mobility    Comment, (Bed Mobility) patient in recliner.  -AC Patient seated in recliner upon OT arrival in the room.  Agreeable to OT.  -AC      Row Name 11/09/24 0752 11/09/24 0747       Transfers    Transfers sit-stand transfer  -AC sit-stand transfer  -AC      Row Name 11/09/24 0747          Bed-Chair Transfer    Bed-Chair [place] (Transfers) contact guard  -AC     Assistive Device (Bed-Chair Transfers) walker, front-wheeled  -AC       Row Name 11/09/24 0752          Sit-Stand Transfer    Sit-Stand [place] (Transfers) contact guard;verbal cues  -AC     Assistive Device (Sit-Stand Transfers) walker, front-wheeled  -AC     Comment, (Sit-Stand Transfer) x3  -AC       Row Name 11/09/24 0752 11/09/24 0747       Functional Mobility    Functional Mobility- Ind. Level contact guard assist;1 person;verbal cues required  -AC contact guard assist;1 person  -AC    Functional Mobility- Device walker, front-wheeled  -AC walker, front-wheeled  -AC    Functional Mobility- Comment Patient was contact-guard assist with rolling walker for recliner to/from sink for grooming with cues for safety and technique.  -AC --      Row Name 11/09/24 0752 11/09/24 0747       Activities of Daily Living    BADL Assessment/Intervention upper body dressing;lower body dressing;grooming;toileting  -AC --  Patient is set up for self-feeding, set up for  grooming, set up for upper body bathing/dressing, min assist for lower body bathing/dressing, contact-guard assist for toileting.  -      Row Name 11/09/24 0752 11/09/24 0747       Mobility    Extremity Weight-bearing Status left lower extremity  - left lower extremity  -    Left Lower Extremity (Weight-bearing Status) weight-bearing as tolerated (WBAT)  - weight-bearing as tolerated (WBAT)  -      Row Name 11/09/24 0752          Upper Body Dressing Assessment/Training    [place] Level (Upper Body Dressing) upper body dressing skills;don;doff;pull-over garment;set up  -AC     Position (Upper Body Dressing) unsupported sitting  -       Row Name 11/09/24 0752          Lower Body Dressing Assessment/Training    [place] Level (Lower Body Dressing) doff;don;pants/bottoms;shoes/slippers;socks;minimum assist (75% patient effort);verbal cues;lower body dressing skills  -AC     Position (Lower Body Dressing) supported standing;unsupported sitting  -       Row Name 11/09/24 0752          Grooming Assessment/Training    [place] Level (Grooming) grooming skills;wash face, hands;verbal cues;contact guard assist  -     Position (Grooming) supported standing;sink side  -       Row Name 11/09/24 0752          Toileting Assessment/Training    [place] Level (Toileting) toileting skills;adjust/manage clothing;standby assist;verbal cues  -     Assistive Devices (Toileting) commode chair;grab bar/safety frame  -     Position (Toileting) supported standing  -               User Key  (r) = Recorded By, (t) = Taken By, (c) = Cosigned By      Initials Name Provider Type     Alyssa Burroughs OT Occupational Therapist                   Obj/Interventions       Row Name 11/09/24 0755 11/09/24 0748       Sensory Assessment (Somatosensory)    Sensory Assessment (Somatosensory) UE sensation intact  - UE sensation intact  -      Row Name 11/09/24 0755 11/09/24 0748       Vision Assessment/Intervention     Visual Impairment/Limitations WFL  -AC WFL  -AC      Row Name 11/09/24 0755 11/09/24 0748       Range of Motion Comprehensive    General Range of Motion bilateral upper extremity ROM WNL  -AC bilateral upper extremity ROM WNL  -AC      Row Name 11/09/24 0755 11/09/24 0748       Strength Comprehensive (MMT)    General Manual Muscle Testing (MMT) Assessment no strength deficits identified  -AC no strength deficits identified  -AC      Row Name 11/09/24 0755 11/09/24 0748       Motor Skills    Motor Skills -- coordination;functional endurance  -AC    Coordination WFL  -AC WFL  -AC    Functional Endurance Fair plus  -AC Fair plus for ADLs  -AC      Row Name 11/09/24 0755 11/09/24 0748       Balance    Balance Assessment standing dynamic balance  -AC standing dynamic balance  -AC    Dynamic Standing Balance verbal cues;contact guard;1-person assist  -AC contact guard;1-person assist  -AC    Position/Device Used, Standing Balance supported;walker, rolling  -AC supported;walker, rolling  -AC    Balance Interventions standing;sit to stand;supported;dynamic;minimal challenge;occupation based/functional task  -AC --              User Key  (r) = Recorded By, (t) = Taken By, (c) = Cosigned By      Initials Name Provider Type    AC Alyssa Burroughs OT Occupational Therapist                   Goals/Plan       Row Name 11/09/24 0750          Bed Mobility Goal 1 (OT)    Activity/Assistive Device (Bed Mobility Goal 1, OT) bed mobility activities, all  -AC     [place] Level/Cues Needed (Bed Mobility Goal 1, OT) modified independence  -AC     Time Frame (Bed Mobility Goal 1, OT) long term goal (LTG);10 days  -       Row Name 11/09/24 0750          Transfer Goal 1 (OT)    Activity/Assistive Device (Transfer Goal 1, OT) transfers, all  -AC     [place] Level/Cues Needed (Transfer Goal 1, OT) modified independence  -AC     Time Frame (Transfer Goal 1, OT) long term goal (LTG);10 days  -       Row Name 11/09/24 0750           Bathing Goal 1 (OT)    Activity/Device (Bathing Goal 1, OT) bathing skills, all  -AC     [place] Level/Cues Needed (Bathing Goal 1, OT) modified independence  -AC     Time Frame (Bathing Goal 1, OT) long term goal (LTG);10 days  -AC       Row Name 11/09/24 0750          Dressing Goal 1 (OT)    Activity/Device (Dressing Goal 1, OT) dressing skills, all  -AC     [place]/Cues Needed (Dressing Goal 1, OT) modified independence  -AC     Time Frame (Dressing Goal 1, OT) long term goal (LTG);10 days  -AC       Row Name 11/09/24 0750          Toileting Goal 1 (OT)    Activity/Device (Toileting Goal 1, OT) toileting skills, all  -AC     [place] Level/Cues Needed (Toileting Goal 1, OT) modified independence  -AC     Time Frame (Toileting Goal 1, OT) long term goal (LTG);10 days  -AC       Row Name 11/09/24 0750          Grooming Goal 1 (OT)    Activity/Device (Grooming Goal 1, OT) grooming skills, all  -AC     [place] (Grooming Goal 1, OT) modified independence  -AC     Time Frame (Grooming Goal 1, OT) long term goal (LTG);10 days  -AC       Row Name 11/09/24 0750          Problem Specific Goal 1 (OT)    Problem Specific Goal 1 (OT) Patient will demonstrate good activity tolerance for ADLs.  -AC     Time Frame (Problem Specific Goal 1, OT) long term goal (LTG);10 days  -AC       Row Name 11/09/24 0750          Therapy Assessment/Plan (OT)    Planned Therapy Interventions (OT) activity tolerance training;functional balance retraining;occupation/activity based interventions;ROM/therapeutic exercise;transfer/mobility retraining;patient/caregiver education/training;BADL retraining  -               User Key  (r) = Recorded By, (t) = Taken By, (c) = Cosigned By      Initials Name Provider Type    Alyssa Lord OT Occupational Therapist                   Clinical Impression       Row Name 11/09/24 0755 11/09/24 0749       Pain Assessment    Pretreatment Pain Rating 1/10  -AC 1/10  -    Posttreatment Pain  Rating 1/10  -AC 1/10  -    Pain Location knee  -AC knee  -AC    Pain Side/Orientation left  -AC left  -AC      Row Name 11/09/24 0755 11/09/24 0749       Plan of Care Review    Plan of Care Reviewed With spouse;patient  -AC patient;spouse  -AC    Progress improving  - no change  -    Outcome Evaluation Patient instructed in lower body adaptive dressing technique, weightbearing status, joint protection and safety with ADL transfers.  Patient to continue with therapy services in order to maximize safety and independence with ADLs.  -AC Patient presents with balance and endurance limitations that impede his/her ability to perform ADLS. The skills of a therapist are necessary to maximize independence with ADLs.  -      Row Name 11/09/24 0749          Therapy Assessment/Plan (OT)    Patient/Family Therapy Goal Statement (OT) Less pain.  -AC     Rehab Potential (OT) good  -     Criteria for Skilled Therapeutic Interventions Met (OT) yes;meets criteria;skilled treatment is necessary  -     Therapy Frequency (OT) 5 times/wk  -       Row Name 11/09/24 0749          Therapy Plan Review/Discharge Plan (OT)    Equipment Needs Upon Discharge (OT) walker, rolling;commode chair;tub bench  -     Anticipated Discharge Disposition (OT) home with assist;home with outpatient therapy services  -       Row Name 11/09/24 0749          Positioning and Restraints    Pre-Treatment Position sitting in chair/recliner  -     Post Treatment Position chair  -AC     In Chair call light within reach;encouraged to call for assist;exit alarm on;reclined;legs elevated  -               User Key  (r) = Recorded By, (t) = Taken By, (c) = Cosigned By      Initials Name Provider Type    AC Alyssa Burroughs, OT Occupational Therapist                   Outcome Measures       Row Name 11/09/24 0750          How much help from another is currently needed...    Putting on and taking off regular lower body clothing? 3  -AC     Bathing  (including washing, rinsing, and drying) 3  -AC     Toileting (which includes using toilet bed pan or urinal) 3  -AC     Putting on and taking off regular upper body clothing 4  -AC     Taking care of personal grooming (such as brushing teeth) 4  -AC     Eating meals 4  -AC     AM-PAC 6 Clicks Score (OT) 21  -AC       Row Name 11/08/24 2111          How much help from another person do you currently need...    Turning from your back to your side while in flat bed without using bedrails? 4  -SV     Moving from lying on back to sitting on the side of a flat bed without bedrails? 3  -SV     Moving to and from a bed to a chair (including a wheelchair)? 3  -SV     Standing up from a chair using your arms (e.g., wheelchair, bedside chair)? 3  -SV     Climbing 3-5 steps with a railing? 2  -SV     To walk in hospital room? 3  -SV     AM-PAC 6 Clicks Score (PT) 18  -SV     Highest Level of Mobility Goal 6 --> Walk 10 steps or more  -SV       Row Name 11/09/24 0750          Functional Assessment    Outcome Measure Options AM-PAC 6 Clicks Daily Activity (OT);Optimal Instrument  -AC       Row Name 11/09/24 0750          Optimal Instrument    Optimal Instrument Optimal - 3  -AC     Bending/Stooping 2  -AC     Standing 2  -AC     Reaching 1  -AC     From the list, choose the 3 activities you would most like to be able to do without any difficulty Bending/stooping;Standing;Reaching  -AC     Total Score Optimal - 3 5  -AC               User Key  (r) = Recorded By, (t) = Taken By, (c) = Cosigned By      Initials Name Provider Type    SV Niesha Robison, RN Registered Nurse    Alyssa Lord OT Occupational Therapist                    Occupational Therapy Education        No education to display                  OT Recommendation and Plan  Planned Therapy Interventions (OT): activity tolerance training, functional balance retraining, occupation/activity based interventions, ROM/therapeutic exercise, transfer/mobility retraining,  patient/caregiver education/training, BADL retraining  Therapy Frequency (OT): 5 times/wk  Plan of Care Review  Plan of Care Reviewed With: spouse, patient  Progress: improving  Outcome Evaluation: Patient instructed in lower body adaptive dressing technique, weightbearing status, joint protection and safety with ADL transfers.  Patient to continue with therapy services in order to maximize safety and independence with ADLs.     Time Calculation:   Evaluation Complexity (OT)  Review Occupational Profile/Medical/Therapy History Complexity: expanded/moderate complexity  Assessment, Occupational Performance/Identification of Deficit Complexity: 1-3 performance deficits  Clinical Decision Making Complexity (OT): problem focused assessment/low complexity  Overall Complexity of Evaluation (OT): low complexity     Time Calculation- OT       Row Name 11/09/24 0751             Time Calculation- OT    OT Received On 11/09/24  -AC      OT Goal Re-Cert Due Date 11/18/24  -AC         Timed Charges    26458 - OT Self Care/Mgmt Minutes 10  -AC         Untimed Charges    OT Eval/Re-eval Minutes 31  -AC         Total Minutes    Timed Charges Total Minutes 10  -AC      Untimed Charges Total Minutes 31  -AC       Total Minutes 41  -AC                User Key  (r) = Recorded By, (t) = Taken By, (c) = Cosigned By      Initials Name Provider Type    AC Alyssa Burroughs OT Occupational Therapist                  Therapy Charges for Today       Code Description Service Date Service Provider Modifiers Qty    [id number]  OT SELF CARE/MGMT/TRAIN EA 15 MIN 11/9/2024 Alyssa Burroughs OT GO 1    [id number]  OT EVAL LOW COMPLEXITY 3 11/9/2024 Alyssa Burroughs OT GO 1                 Alyssa Burroughs OT  11/9/2024

## 2024-11-11 ENCOUNTER — TELEPHONE (OUTPATIENT)
Dept: ORTHOPEDIC SURGERY | Facility: CLINIC | Age: 69
End: 2024-11-11
Payer: MEDICARE

## 2024-11-11 DIAGNOSIS — Z47.1 AFTERCARE FOLLOWING LEFT KNEE JOINT REPLACEMENT SURGERY: Primary | ICD-10-CM

## 2024-11-11 DIAGNOSIS — Z96.652 AFTERCARE FOLLOWING LEFT KNEE JOINT REPLACEMENT SURGERY: Primary | ICD-10-CM

## 2024-11-11 NOTE — TELEPHONE ENCOUNTER
Caller: STURGEONLAUREN    Relationship: Emergency Contact    Best call back number: [phone]    Equipment requested: BEDSIDE TOILET    Reason for the request: TKR    Additional information or concerns: PLEASE SEND TO [facility] IN [place]  PLEASE REACH OUT AFTER SENDING OVER

## 2024-11-14 DIAGNOSIS — R26.2 DIFFICULTY IN WALKING: ICD-10-CM

## 2024-11-14 RX ORDER — HYDROCODONE BITARTRATE AND ACETAMINOPHEN 7.5; 325 MG/1; MG/1
1-2 TABLET ORAL EVERY 4 HOURS PRN
Qty: 40 TABLET | Refills: 0 | OUTPATIENT
Start: 2024-11-14

## 2024-11-14 NOTE — TELEPHONE ENCOUNTER
Caller: STURGEON,BARBARA    Relationship: Emergency Contact    Best call back number: [phone]    Requested Prescriptions:   Requested Prescriptions     Pending Prescriptions Disp Refills    HYDROcodone-acetaminophen (Norco) 7.5-325 MG per tablet 40 tablet 0     Sig: Take 1-2 tablets by mouth Every 4 (Four) Hours As Needed for Moderate Pain.        Pharmacy where request should be sent: [facility], [facility], & [facility], [place] - [address] - [phone] [facility] [phone] FX     Last office visit with prescribing clinician: 10/8/2024   Last telemedicine visit with prescribing clinician: Visit date not found   Next office visit with prescribing clinician: Visit date not found     Additional details provided by patient: N/A    Does the patient have less than a 3 day supply:  [x] Yes  [] No    Would you like a call back once the refill request has been completed: [x] Yes [] No    If the office needs to give you a call back, can they leave a voicemail: [x] Yes [] No    Nhi Tyson Rep   11/14/24 08:46 EST

## 2024-11-15 ENCOUNTER — TELEPHONE (OUTPATIENT)
Dept: ORTHOPEDIC SURGERY | Facility: CLINIC | Age: 69
End: 2024-11-15

## 2024-11-15 RX ORDER — HYDROCODONE BITARTRATE AND ACETAMINOPHEN 7.5; 325 MG/1; MG/1
1 TABLET ORAL EVERY 4 HOURS PRN
Qty: 42 TABLET | Refills: 0 | Status: SHIPPED | OUTPATIENT
Start: 2024-11-15

## 2024-11-15 NOTE — TELEPHONE ENCOUNTER
PLEASE SEE ALREADY SIGNED TEL ENC FROM YESTERDAY 11/14/24 @1026     Caller: STURGEON,BARBARA - WIFE FOLLOWING UP SINCE HAS NOT HEARD ANYTHING YET re POST OP MED REFILL NEEDED     Best call back number: [phone]     Requested Prescriptions             Pending Prescriptions Disp Refills    HYDROcodone-acetaminophen (Norco) 7.5-325 MG per tablet 40 tablet 0       Sig: Take 1-2 tablets by mouth Every 4 (Four) Hours As Needed for Moderate Pain.         Pharmacy where request should be sent: [facility], [facility], & [facility] [place], [place] - [address] - [phone]  - [phone] FX      Last office visit with prescribing clinician: 10/8/2024      Next office visit with prescribing clinician: 11/22/24       Additional details provided by patient: PATIENT HAD LEFT TOTAL KNEE REPLACEMENT 11/08/24 BY DR REY      Does the patient have less than a 3 day supply:  [x] Yes  [] No  WILL RUN OUT OF MEDICATION SUNDAY 11-17-24 AM      Would you like a call back once the refill request has been completed: [x] Yes [] No     If the office needs to give you a call back, can they leave a voicemail: [x] Yes [] No          THANKS

## 2024-11-22 ENCOUNTER — OFFICE VISIT (OUTPATIENT)
Dept: ORTHOPEDIC SURGERY | Facility: CLINIC | Age: 69
End: 2024-11-22
Payer: MEDICARE

## 2024-11-22 VITALS
HEART RATE: 84 BPM | SYSTOLIC BLOOD PRESSURE: 118 MMHG | DIASTOLIC BLOOD PRESSURE: 75 MMHG | WEIGHT: 220 LBS | OXYGEN SATURATION: 96 % | BODY MASS INDEX: 32.58 KG/M2 | HEIGHT: 69 IN

## 2024-11-22 DIAGNOSIS — Z96.652 S/P TOTAL KNEE ARTHROPLASTY, LEFT: Primary | ICD-10-CM

## 2024-11-22 PROCEDURE — 1160F RVW MEDS BY RX/DR IN RCRD: CPT

## 2024-11-22 PROCEDURE — 3078F DIAST BP <80 MM HG: CPT

## 2024-11-22 PROCEDURE — 3074F SYST BP LT 130 MM HG: CPT

## 2024-11-22 PROCEDURE — 1159F MED LIST DOCD IN RCRD: CPT

## 2024-11-22 PROCEDURE — 99024 POSTOP FOLLOW-UP VISIT: CPT

## 2024-11-22 NOTE — PROGRESS NOTES
"Chief Complaint  Follow-up of the Left Knee    Subjective      Ricky W Sturgeon presents to [facility] for 2-week follow-up of left total knee arthroplasty with Dr. Calix on 11/8/2024.  Patient is ambulatory with use of a walker.  He is attending physical therapy at [facility] and doing well.  Reports that he is back on his maintenance dose of Plavix.  He is here today for staple removal and routine checkup.    Objective   Allergies   Allergen Reactions    Sulfa Antibiotics Angioedema    Latex, Natural Rubber Nausea Only       Vital Signs:   /75   Pulse 84   Ht 175.3 cm (69.02\")   Wt 99.8 kg (220 lb)   SpO2 96%   BMI 32.47 kg/m²       Physical Exam    Constitutional: Awake, alert. Well nourished appearance.    Integumentary: Warm, dry, intact. No obvious rashes.    HENT: Atraumatic, normocephalic.   Respiratory: Non labored respirations .   Cardiovascular: Intact peripheral pulses.    Psychiatric: Normal mood and affect. A&O X3    Ortho Exam  Left knee: Incision is well-approximated and healing appropriately.  There is no redness, drainage or tenderness.  Mild edema generalized over the knee.  Stable to varus valgus stress.  Stable to anterior posterior drawer test.  neurovascular intact.  Sensation is intact.  Range of motion 0 to 95 degrees    Imaging Results (Most Recent)       Procedure Component Value Units Date/Time    XR Knee 3 View Left [[id number]] Resulted: 11/25/24 0802     Updated: 11/25/24 0802    Narrative:      X-Ray Report:  Study: X-rays ordered, taken in the office, and reviewed today.   Site: Left knee xray  Indication: Left TKA  View: AP/Lateral, Standing, and [place] view(s)  Findings: Intact left total knee arthroplasty. No evidence of hardware   malfunction or periprosthetic fractures.  Patella centrally tracking.   Prior studies available for comparison: yes                       Assessment and Plan   Problem List Items Addressed This Visit    None  Visit " Diagnoses       S/P total knee arthroplasty, left    -  Primary    Relevant Orders    XR Knee 3 View Left (Completed)            Follow Up   Return in about 4 weeks (around 12/20/2024).    Patient is a non-smoker, did not discuss options for smoking cessation.     Social History     Socioeconomic History    Marital status:    Tobacco Use    Smoking status: Never    Smokeless tobacco: Never   Vaping Use    Vaping status: Never Used   Substance and Sexual Activity    Alcohol use: Not Currently    Drug use: Never    Sexual activity: Defer       Patient Instructions   X-rays taken and reviewed, showing intact hardware.    Staples removed in office and Steri-Strips applied.  Patient educated on incision care.  Please keep incision clean and dry.  Do not soak or submerge in water until incision is fully healed.  Do not apply creams or lotions over the incision.  Please allow Steri-Strips to fall off on their own within 7 to 10 days.    Continue icing and elevation of the knee as needed to help with pain and swelling.  Ice knee up to 3 or 4 times daily for no longer than 15 to 20 minutes at a time.    Continue PT to progress ROM, strength, and weightbearing status.    Follow-up in 4 weeks. Repeat x-rays not needed at this visit.  Please call with questions or concerns.   Patient was given instructions and counseling regarding his condition or for health maintenance advice. Please see specific information pulled into the AVS if appropriate.

## 2024-12-05 DIAGNOSIS — R26.2 DIFFICULTY IN WALKING: ICD-10-CM

## 2024-12-05 RX ORDER — HYDROCODONE BITARTRATE AND ACETAMINOPHEN 7.5; 325 MG/1; MG/1
1 TABLET ORAL EVERY 4 HOURS PRN
Qty: 42 TABLET | Refills: 0 | Status: SHIPPED | OUTPATIENT
Start: 2024-12-05

## 2024-12-26 ENCOUNTER — OFFICE VISIT (OUTPATIENT)
Dept: ORTHOPEDIC SURGERY | Facility: CLINIC | Age: 69
End: 2024-12-26
Payer: MEDICARE

## 2024-12-26 VITALS
HEIGHT: 69 IN | BODY MASS INDEX: 32.59 KG/M2 | DIASTOLIC BLOOD PRESSURE: 77 MMHG | SYSTOLIC BLOOD PRESSURE: 136 MMHG | OXYGEN SATURATION: 97 % | HEART RATE: 68 BPM | WEIGHT: 220.02 LBS

## 2024-12-26 DIAGNOSIS — Z96.652 S/P TOTAL KNEE ARTHROPLASTY, LEFT: Primary | ICD-10-CM

## 2024-12-26 RX ORDER — ESCITALOPRAM OXALATE 10 MG/1
1 TABLET ORAL DAILY
COMMUNITY
Start: 2024-12-12

## 2024-12-26 RX ORDER — HYDROXYZINE HYDROCHLORIDE 10 MG/1
10 TABLET, FILM COATED ORAL 2 TIMES DAILY PRN
COMMUNITY
Start: 2024-12-12

## 2024-12-26 RX ORDER — BUPROPION HYDROCHLORIDE 150 MG/1
1 TABLET ORAL DAILY
COMMUNITY
Start: 2024-12-12

## 2024-12-26 NOTE — PATIENT INSTRUCTIONS
Patient is doing very well. Advised to continue PT to completion to progress strength and ROM. Continue home exercises.     Continue icing knee as needed up to 4 times daily for no longer than 15-20 mins at a time.     Follow-up in 4-5 weeks. Repeat x-rays needed. Call with changes or concerns.

## 2024-12-26 NOTE — PROGRESS NOTES
"Chief Complaint  Follow-up of the Left Knee    Subjective      Ricky W Sturgeon presents to [facility] for 6-week follow-up of left total knee arthroplasty with Dr. Calix on 11/8/2024.  Patient is ambulatory without use of assistive devices.  He is continuing physical therapy with [facility] and doing well.  He reports that he has discontinued his pain medication is doing well with this.  He is getting back to normal activities.  Reports that he does still continue to have some discomfort on the medial and lateral aspects of the knee.    Objective   Allergies   Allergen Reactions    Sulfa Antibiotics Angioedema    Latex, Natural Rubber Nausea Only       Vital Signs:   /77   Pulse 68   Ht 175.3 cm (69\")   Wt 99.8 kg (220 lb 0.3 oz)   SpO2 97%   BMI 32.49 kg/m²       Physical Exam    Constitutional: Awake, alert. Well nourished appearance.    Integumentary: Warm, dry, intact. No obvious rashes.    HENT: Atraumatic, normocephalic.   Respiratory: Non labored respirations .   Cardiovascular: Intact peripheral pulses.    Psychiatric: Normal mood and affect. A&O X3    Ortho Exam  Left knee: Incision is well-approximated and healing appropriately.  There is a small 2 inch portion on the medial to distal portion of the incision that still has a scab.  No redness, drainage or tenderness.  No significant edema noted.  Range of motion 0 to 120 degrees.  Stable to varus and valgus stress.  Stable to anterior posterior drawer test.  Neurovascular intact.  Knee extensor mechanism is intact.  Dorsiflexion plantarflexion of the foot is appropriate.    Imaging Results (Most Recent)       None                      Assessment and Plan   Problem List Items Addressed This Visit    None  Visit Diagnoses       S/P total knee arthroplasty, left    -  Primary    Relevant Orders    Ambulatory Referral to Physical Therapy for Evaluation & Treatment (Completed)            Follow Up   Return in about 5 weeks " (around 1/30/2025).    Patient is a non-smoker, did not discuss options for smoking cessation.     Social History     Socioeconomic History    Marital status:    Tobacco Use    Smoking status: Never    Smokeless tobacco: Never   Vaping Use    Vaping status: Never Used   Substance and Sexual Activity    Alcohol use: Not Currently    Drug use: Never    Sexual activity: Defer       Patient Instructions   Patient is doing very well. Advised to continue PT to completion to progress strength and ROM. Continue home exercises.     Continue icing knee as needed up to 4 times daily for no longer than 15-20 mins at a time.     Follow-up in 4-5 weeks. Repeat x-rays needed. Call with changes or concerns.    Patient was given instructions and counseling regarding his condition or for health maintenance advice. Please see specific information pulled into the AVS if appropriate.

## 2025-01-10 ENCOUNTER — OFFICE VISIT (OUTPATIENT)
Dept: CARDIOLOGY | Facility: CLINIC | Age: 70
End: 2025-01-10
Payer: MEDICARE

## 2025-01-10 VITALS
DIASTOLIC BLOOD PRESSURE: 77 MMHG | HEART RATE: 60 BPM | SYSTOLIC BLOOD PRESSURE: 109 MMHG | WEIGHT: 230 LBS | HEIGHT: 68 IN | BODY MASS INDEX: 34.86 KG/M2

## 2025-01-10 DIAGNOSIS — E78.2 MIXED HYPERLIPIDEMIA: ICD-10-CM

## 2025-01-10 DIAGNOSIS — Z95.0 CARDIAC PACEMAKER: ICD-10-CM

## 2025-01-10 DIAGNOSIS — I25.10 CORONARY ARTERY DISEASE INVOLVING NATIVE CORONARY ARTERY OF NATIVE HEART WITHOUT ANGINA PECTORIS: Primary | ICD-10-CM

## 2025-01-10 DIAGNOSIS — I10 ESSENTIAL HYPERTENSION: ICD-10-CM

## 2025-01-30 ENCOUNTER — OFFICE VISIT (OUTPATIENT)
Dept: ORTHOPEDIC SURGERY | Facility: CLINIC | Age: 70
End: 2025-01-30
Payer: MEDICARE

## 2025-01-30 VITALS
BODY MASS INDEX: 34.75 KG/M2 | HEART RATE: 82 BPM | SYSTOLIC BLOOD PRESSURE: 136 MMHG | HEIGHT: 68 IN | WEIGHT: 229.28 LBS | OXYGEN SATURATION: 94 % | DIASTOLIC BLOOD PRESSURE: 84 MMHG

## 2025-01-30 DIAGNOSIS — Z96.652 S/P TOTAL KNEE ARTHROPLASTY, LEFT: Primary | ICD-10-CM

## 2025-01-30 DIAGNOSIS — M25.562 LEFT KNEE PAIN, UNSPECIFIED CHRONICITY: ICD-10-CM

## 2025-01-30 DIAGNOSIS — Z47.1 AFTERCARE FOLLOWING LEFT KNEE JOINT REPLACEMENT SURGERY: ICD-10-CM

## 2025-01-30 DIAGNOSIS — Z96.652 AFTERCARE FOLLOWING LEFT KNEE JOINT REPLACEMENT SURGERY: ICD-10-CM

## 2025-01-30 PROCEDURE — 1159F MED LIST DOCD IN RCRD: CPT | Performed by: PHYSICIAN ASSISTANT

## 2025-01-30 PROCEDURE — 3075F SYST BP GE 130 - 139MM HG: CPT | Performed by: PHYSICIAN ASSISTANT

## 2025-01-30 PROCEDURE — 1160F RVW MEDS BY RX/DR IN RCRD: CPT | Performed by: PHYSICIAN ASSISTANT

## 2025-01-30 PROCEDURE — 99024 POSTOP FOLLOW-UP VISIT: CPT | Performed by: PHYSICIAN ASSISTANT

## 2025-01-30 PROCEDURE — 3079F DIAST BP 80-89 MM HG: CPT | Performed by: PHYSICIAN ASSISTANT

## 2025-01-30 RX ORDER — HYDROCODONE BITARTRATE AND ACETAMINOPHEN 5; 325 MG/1; MG/1
1 TABLET ORAL EVERY 12 HOURS SCHEDULED
COMMUNITY
Start: 2025-01-13

## 2025-01-30 NOTE — PROGRESS NOTES
Chief Complaint  Follow-up of the Left Knee    Subjective          History of Present Illness     Ricky W Sturgeon is a 69 y.o. male presents today for a follow up of left knee. Patient is 11.5 weeks post op left total knee arthroplasty performed by Dr. Calix on 11/8/2024.  At last visit patient had discontinued his pain medication and was getting back to normal activities.  He is doing physical therapy with [facility].  He is here today for routine follow-up.    Patient presents today for follow-up and states that he has been done with physical therapy for a month.  He has good range of motion.  He is very active and works in construction.  States that with how hard he works, he does have aching in the knee at night but it is not terrible.  The main issue is he has his deep squatting and kneeling causes discomfort.  But overall he is very pleased with the knee.    Allergies   Allergen Reactions    Sulfa Antibiotics Angioedema    Latex, Natural Rubber Nausea Only        Social History     Socioeconomic History    Marital status:    Tobacco Use    Smoking status: Never    Smokeless tobacco: Never   Vaping Use    Vaping status: Never Used   Substance and Sexual Activity    Alcohol use: Not Currently    Drug use: Never    Sexual activity: Defer        Tobacco Use: Low Risk  (1/30/2025)    Patient History     Smoking Tobacco Use: Never     Smokeless Tobacco Use: Never     Passive Exposure: Not on file     Patient reports that they are a nonsmoker; cessation education not applicable.     I reviewed the patient's chief complaint, history of present illness, review of systems, past medical history, surgical history, family history, social history, medications, and allergy list.     REVIEW OF SYSTEMS    Constitutional: Denies fevers, chills, weight loss  Cardiovascular: Denies chest pain, shortness of breath  Skin: Denies rashes, acute skin changes  Neurologic: Denies headache, loss of consciousness  MSK: Left knee  "pain      Objective   Vital Signs:   /84   Pulse 82   Ht 172.7 cm (68\")   Wt 104 kg (229 lb 4.5 oz)   SpO2 94%   BMI 34.86 kg/m²     Body mass index is 34.86 kg/m².    Physical Exam    General: Alert, no acute distress  Gait: Nonantalgic  Left lower extremity: Incision well-healed.  No concerning signs for infection.  Mild knee effusion. Knee extensor mechanism intact. Hip flexion intact.  125 degrees flexion.  Full extension. Knee stable to varus and valgus stress. Calf soft, nontender. Demonstrates active ankle plantarflexion and dorsiflexion. Sensation intact over the dorsal and plantar foot. Palpable pedal pulses.       Procedures    Imaging Results (Most Recent)       Procedure Component Value Units Date/Time    XR Knee 3 View Left [[id number]] Resulted: 01/30/25 1506     Updated: 01/30/25 1506    Narrative:      X-Ray Report:  Study: X-rays ordered, taken in the office, and reviewed today.   Site: Left knee Xray  Indication: Left total knee arthroplasty follow up.   View: AP/Lateral, Standing, and [place] view(s)  Findings: Intact left total knee arthroplasty. No evidence of hardware   malfunction, complication or loosening. No periprosthetic fractures   visualized. Patella is midline tracking on sunrise view.   Prior studies available for comparison: yes                         Assessment and Plan    Diagnoses and all orders for this visit:    1. S/P total knee arthroplasty, left (Primary)  -     XR Knee 3 View Left    2. Aftercare following left knee joint replacement surgery    3. Left knee pain, unspecified chronicity          Call or return if symptoms worsen or patient has any concerns.       Follow Up   Return in about 9 months (around 10/30/2025).  Patient Instructions   X-rays were reviewed with the patient today. Hardware is stable and intact. Patient is doing well.     Incision well healed. May apply Vitamin E, cocoa butter, etc. over incision to aid in scar appearance. Educated that " numbness over the incision can still remain at this point, and should continue to improve for up to 1 year postop. However, at the year [place] if still experiencing numbness it may not return.    Encouraged to continue home exercises for ROM and strengthening.   May continue icing as needed for no more than 20 minutes at a time for comfort and inflammation.   Discussed avoiding kneeling directly on to the prosthetic and avoid deep squatting.     Discussed the need for antibiotic prophylaxis with any dental procedures following total joint replacement for life. Patient instructed to contact office if dental office is reluctant to prescribe antibiotics prior to procedure and we will prescribe them.      Patient will follow-up in 9 months. We will obtain new x-rays of the left knee at next visit.    Call or return if symptoms worsen or patient has any concerns.    Patient was given instructions and counseling regarding his condition or for health maintenance advice. Please see specific information pulled into the AVS if appropriate.     Alyssa Montemayor PA-C  01/30/25  15:06 EST

## 2025-01-30 NOTE — PATIENT INSTRUCTIONS
X-rays were reviewed with the patient today. Hardware is stable and intact. Patient is doing well.     Incision well healed. May apply Vitamin E, cocoa butter, etc. over incision to aid in scar appearance. Educated that numbness over the incision can still remain at this point, and should continue to improve for up to 1 year postop. However, at the year [place] if still experiencing numbness it may not return.    Encouraged to continue home exercises for ROM and strengthening.   May continue icing as needed for no more than 20 minutes at a time for comfort and inflammation.   Discussed avoiding kneeling directly on to the prosthetic and avoid deep squatting.     Discussed the need for antibiotic prophylaxis with any dental procedures following total joint replacement for life. Patient instructed to contact office if dental office is reluctant to prescribe antibiotics prior to procedure and we will prescribe them.      Patient will follow-up in 9 months. We will obtain new x-rays of the left knee at next visit.    Call or return if symptoms worsen or patient has any concerns.

## 2025-02-10 ENCOUNTER — TELEPHONE (OUTPATIENT)
Dept: CARDIOLOGY | Facility: CLINIC | Age: 70
End: 2025-02-10
Payer: MEDICARE

## 2025-02-10 NOTE — TELEPHONE ENCOUNTER
Ricky W Sturgeon   1955  [address]  [facility] [address]      Dear Ricky W Sturgeon        I wanted to inform you that we received a notification that your Remote Cardiac Monitor, is not communicating properly. Monitoring your implantable cardiac device is a critical component to ensure that your device is functioning properly, battery longevity and that your heart rhythm is controlled.  Please check your connection to ensure your monitor has not been unplugged. If you are continuing to have connection issues, please contact your company's technical service department at [employer] at [phone].      Thank you,       This document has been electronically signed by Karen Quiros   February 10, 2025 14:20 EST

## 2025-06-09 ENCOUNTER — TELEPHONE (OUTPATIENT)
Dept: CARDIOLOGY | Facility: CLINIC | Age: 70
End: 2025-06-09
Payer: MEDICARE

## 2025-06-09 NOTE — TELEPHONE ENCOUNTER
Ricky W Sturgeon   1955  [address]  [facility] [address]      Dear Ricky W Sturgeon        I wanted to inform you that we received a notification that your Remote Cardiac Monitor, is not communicating properly. Monitoring your implantable cardiac device is a critical component to ensure that your device is functioning properly, battery longevity and that your heart rhythm is controlled.  Please check your connection to ensure your monitor has not been unplugged. If you are continuing to have connection issues, please contact your company's technical service department at [employer] at [phone].      Thank you,       This document has been electronically signed by Karen Quiros   June 9, 2025 14:29 EDT

## 2025-06-25 RX ORDER — FUROSEMIDE 40 MG/1
40 TABLET ORAL 2 TIMES DAILY
Qty: 180 TABLET | Refills: 0 | Status: SHIPPED | OUTPATIENT
Start: 2025-06-25

## 2025-06-25 RX ORDER — METOPROLOL SUCCINATE 50 MG/1
50 TABLET, EXTENDED RELEASE ORAL DAILY
Qty: 90 TABLET | Refills: 0 | Status: SHIPPED | OUTPATIENT
Start: 2025-06-25

## 2025-07-14 ENCOUNTER — CLINICAL SUPPORT NO REQUIREMENTS (OUTPATIENT)
Dept: CARDIOLOGY | Facility: CLINIC | Age: 70
End: 2025-07-14
Payer: MEDICARE

## 2025-07-14 ENCOUNTER — OFFICE VISIT (OUTPATIENT)
Dept: CARDIOLOGY | Facility: CLINIC | Age: 70
End: 2025-07-14
Payer: MEDICARE

## 2025-07-14 VITALS
BODY MASS INDEX: 36.07 KG/M2 | WEIGHT: 238 LBS | DIASTOLIC BLOOD PRESSURE: 88 MMHG | HEIGHT: 68 IN | HEART RATE: 67 BPM | SYSTOLIC BLOOD PRESSURE: 122 MMHG

## 2025-07-14 DIAGNOSIS — E78.2 MIXED HYPERLIPIDEMIA: ICD-10-CM

## 2025-07-14 DIAGNOSIS — Z95.0 CARDIAC PACEMAKER: ICD-10-CM

## 2025-07-14 DIAGNOSIS — I10 ESSENTIAL HYPERTENSION: ICD-10-CM

## 2025-07-14 DIAGNOSIS — I49.5 SSS (SICK SINUS SYNDROME): Primary | ICD-10-CM

## 2025-07-14 DIAGNOSIS — I25.10 CORONARY ARTERY DISEASE INVOLVING NATIVE CORONARY ARTERY OF NATIVE HEART WITHOUT ANGINA PECTORIS: Primary | ICD-10-CM

## 2025-07-14 PROCEDURE — 99214 OFFICE O/P EST MOD 30 MIN: CPT | Performed by: FAMILY MEDICINE

## 2025-07-14 PROCEDURE — 3074F SYST BP LT 130 MM HG: CPT | Performed by: FAMILY MEDICINE

## 2025-07-14 PROCEDURE — 3079F DIAST BP 80-89 MM HG: CPT | Performed by: FAMILY MEDICINE

## 2025-07-23 LAB
MDC_IDC_MSMT_BATTERY_REMAINING_LONGEVITY: 41 MO
MDC_IDC_MSMT_BATTERY_REMAINING_PERCENTAGE: 32 %
MDC_IDC_MSMT_BATTERY_RRT_TRIGGER: 2.6
MDC_IDC_MSMT_BATTERY_STATUS: NORMAL
MDC_IDC_MSMT_BATTERY_VOLTAGE: 2.95
MDC_IDC_MSMT_LEADCHNL_RA_IMPEDANCE_VALUE: 480
MDC_IDC_MSMT_LEADCHNL_RA_PACING_THRESHOLD_POLARITY: NORMAL
MDC_IDC_MSMT_LEADCHNL_RA_SENSING_INTR_AMPL: 5
MDC_IDC_MSMT_LEADCHNL_RV_DTM: NORMAL
MDC_IDC_MSMT_LEADCHNL_RV_IMPEDANCE_VALUE: 740
MDC_IDC_MSMT_LEADCHNL_RV_PACING_THRESHOLD_AMPLITUDE: 0.62
MDC_IDC_MSMT_LEADCHNL_RV_PACING_THRESHOLD_POLARITY: NORMAL
MDC_IDC_MSMT_LEADCHNL_RV_PACING_THRESHOLD_PULSEWIDTH: 0.4
MDC_IDC_MSMT_LEADCHNL_RV_SENSING_INTR_AMPL: 12
MDC_IDC_PG_IMPLANT_DTM: NORMAL
MDC_IDC_PG_MFG: NORMAL
MDC_IDC_PG_MODEL: NORMAL
MDC_IDC_PG_SERIAL: NORMAL
MDC_IDC_PG_TYPE: NORMAL
MDC_IDC_SESS_DTM: NORMAL
MDC_IDC_SESS_TYPE: NORMAL
MDC_IDC_SET_BRADY_AT_MODE_SWITCH_RATE: 180
MDC_IDC_SET_BRADY_LOWRATE: 60
MDC_IDC_SET_BRADY_MAX_SENSOR_RATE: 125
MDC_IDC_SET_BRADY_MAX_TRACKING_RATE: 120
MDC_IDC_SET_BRADY_MODE: NORMAL
MDC_IDC_SET_BRADY_PAV_DELAY: 275
MDC_IDC_SET_BRADY_SAV_DELAY: 180
MDC_IDC_SET_LEADCHNL_RA_PACING_AMPLITUDE: 2
MDC_IDC_SET_LEADCHNL_RA_PACING_POLARITY: NORMAL
MDC_IDC_SET_LEADCHNL_RA_PACING_PULSEWIDTH: 0.5
MDC_IDC_SET_LEADCHNL_RA_SENSING_POLARITY: NORMAL
MDC_IDC_SET_LEADCHNL_RA_SENSING_SENSITIVITY: 0.5
MDC_IDC_SET_LEADCHNL_RV_PACING_AMPLITUDE: 0.88
MDC_IDC_SET_LEADCHNL_RV_PACING_POLARITY: NORMAL
MDC_IDC_SET_LEADCHNL_RV_PACING_PULSEWIDTH: 0.4
MDC_IDC_SET_LEADCHNL_RV_SENSING_POLARITY: NORMAL
MDC_IDC_SET_LEADCHNL_RV_SENSING_SENSITIVITY: 2
MDC_IDC_STAT_AT_BURDEN_PERCENT: 0
MDC_IDC_STAT_BRADY_RA_PERCENT_PACED: 74
MDC_IDC_STAT_BRADY_RV_PERCENT_PACED: 1

## 2025-08-05 ENCOUNTER — TRANSCRIBE ORDERS (OUTPATIENT)
Dept: ADMINISTRATIVE | Facility: HOSPITAL | Age: 70
End: 2025-08-05
Payer: MEDICARE

## 2025-08-05 DIAGNOSIS — S46.911A STRAIN OF UNSPECIFIED MUSCLE, FASCIA AND TENDON AT SHOULDER AND UPPER ARM LEVEL, RIGHT ARM, INITIAL ENCOUNTER: Primary | ICD-10-CM

## 2025-08-08 ENCOUNTER — HOSPITAL ENCOUNTER (OUTPATIENT)
Dept: CT IMAGING | Facility: HOSPITAL | Age: 70
Discharge: HOME | End: 2025-08-08
Payer: MEDICARE

## 2025-08-08 DIAGNOSIS — S46.911A STRAIN OF UNSPECIFIED MUSCLE, FASCIA AND TENDON AT SHOULDER AND UPPER ARM LEVEL, RIGHT ARM, INITIAL ENCOUNTER: ICD-10-CM

## 2025-08-08 PROCEDURE — 73200 CT UPPER EXTREMITY W/O DYE: CPT

## (undated) DEVICE — 450 ML BOTTLE OF 0.05% CHLORHEXIDINE GLUCONATE IN 99.95% STERILE WATER FOR IRRIGATION, USP AND APPLICATOR.: Brand: IRRISEPT ANTIMICROBIAL WOUND LAVAGE

## (undated) DEVICE — BASIC SINGLE BASIN-LF: Brand: MEDLINE INDUSTRIES, INC.

## (undated) DEVICE — CVR LEG BOOTLEG F/R NOSKID 33IN

## (undated) DEVICE — GLOVE,SURG,SENSICARE SLT,LF,PF,6.5: Brand: MEDLINE

## (undated) DEVICE — SOL IRRG H2O PL/BG 1000ML STRL

## (undated) DEVICE — BNDG ELAS MATRX V/CLS 6INX10YD LF

## (undated) DEVICE — COLON KIT: Brand: MEDLINE INDUSTRIES, INC.

## (undated) DEVICE — STERILE POLYISOPRENE POWDER-FREE SURGICAL GLOVES WITH EMOLLIENT COATING: Brand: PROTEXIS

## (undated) DEVICE — SINGLE-USE BIOPSY FORCEPS: Brand: RADIAL JAW 4

## (undated) DEVICE — Device: Brand: DEFENDO AIR/WATER/SUCTION AND BIOPSY VALVE

## (undated) DEVICE — TOTAL KNEE-LF: Brand: MEDLINE INDUSTRIES, INC.

## (undated) DEVICE — INTENDED FOR TISSUE SEPARATION, AND OTHER PROCEDURES THAT REQUIRE A SHARP SURGICAL BLADE TO PUNCTURE OR CUT.: Brand: BARD-PARKER ® CARBON RIB-BACK BLADES

## (undated) DEVICE — GLOVE,SURG,SENSICARE SLT,LF,PF,8.5: Brand: MEDLINE

## (undated) DEVICE — TOWEL,OR,DSP,ST,BLUE,STD,4/PK,20PK/CS: Brand: MEDLINE

## (undated) DEVICE — EGD OR ERCP KIT: Brand: MEDLINE INDUSTRIES, INC.

## (undated) DEVICE — THE SINGLE USE ETRAP – POLYP TRAP IS USED FOR SUCTION RETRIEVAL OF ENDOSCOPICALLY REMOVED POLYPS.: Brand: ETRAP

## (undated) DEVICE — NEEDLE,18GX1.5",REG,BEVEL: Brand: MEDLINE

## (undated) DEVICE — Device: Brand: XPERIENCE

## (undated) DEVICE — SYR LUERLOK 30CC

## (undated) DEVICE — GAUZE,SPONGE,4"X4",16PLY,STRL,LF,10/TRAY: Brand: MEDLINE

## (undated) DEVICE — MAT FLR ABS W/BLU/LINER 56X72IN WHT

## (undated) DEVICE — NO-SCRATCH ™ SMALL WHITNEY CURETTE ™ IS A SINGLE-USE, PLASTIC CURETTE FOR QUICKLY APPLYING, MANIPULATING AND REMOVING BONE CEMENT DURING HIP AND KNEE REPLACEMENT SURGERY. THE PLASTIC IS SOFTER THAN STEEL INSTRUMENTS, REDUCING THE RISK OF DAMAGING THE PROSTHESIS WITH METAL INSTRUMENTS.  THE CURETTE’S 6MM TIP REMOVES EXCESS CEMENT FROM REPLACEMENT HIPS AND KNEES. EASY-TO-MANEUVER, THE SMALL BLUE CURETTE LETS YOU REMOVE CEMENT FROM ALL EDGES OF THE PROSTHESIS.NO-SCRATCH WHITNEY SMALL CURETTE FEATURES:SAFER THAN STEEL- MADE OF PLASTIC - STURDY YET SOFTER THAN SURGICAL STEEL.HANDIER- EACH TOOL HAS A MOLDED-IN THUMB INDENTATION INSTANTLY ORIENTING THE TOOL.- EASIER TO MANEUVER IN HARD TO SEE PLACES.- COLOR-CODED FOR EASY IDENTIFICATION.FASTER- COMES INDIVIDUALLY PACKAGED IN STERILE, PEEL OPEN POUCH, READY TO GO.- APPLIES, MANIPULATES, OR REMOVES CEMENT WITH FINGERTIP PRECISION.ECONOMICAL- THE COST OF A SINGLE REVISION DWARFS THE COST OF A SINGLE-USE CURETTE. - DISPOSABLE – THERE’S NO NEED TO WASTE TIME REMOVING HARDENED CEMENT OR RE-STERILIZING TOOLS.- LESS EXPENSIVE TO BUY AND INVENTORY - ORDER ONLY THE TOOL YOU USE.- PACKAGED 25 INDIVIDUALLY WRAPPED TOOLS TO A CARTON FOR CONVENIENT SHELF STORAGE.: Brand: WHITNEY NO-SCRATCH CURETTE (SMALL)

## (undated) DEVICE — 3 BONE CEMENT MIXER: Brand: MIXEVAC

## (undated) DEVICE — PROXIMATE RH ROTATING HEAD SKIN STAPLERS (35 WIDE) CONTAINS 35 STAINLESS STEEL STAPLES: Brand: PROXIMATE

## (undated) DEVICE — GLV SURG BIOGEL LTX PF 8 1/2

## (undated) DEVICE — DISPOSABLE TOURNIQUET CUFF 30"X4", 1-LINE, WHITE, STERILE, 1EA/PK, 10PK/CS: Brand: ASP MEDICAL

## (undated) DEVICE — PULLOVER TOGA, 2X LARGE: Brand: FLYTE, SURGICOOL

## (undated) DEVICE — UNDERCAST PADDING: Brand: DEROYAL

## (undated) DEVICE — SLV SCD KN/LEN ADJ EXPRSS BLENDED MD 1P/U

## (undated) DEVICE — PLASMABLADE PS200-040 4.0: Brand: PLASMABLADE™

## (undated) DEVICE — APPL CHLORAPREP HI/LITE 26ML ORNG

## (undated) DEVICE — DRAPE,TOWEL,LARGE,INVISISHIELD: Brand: MEDLINE

## (undated) DEVICE — GLOVE,SURG,SENSICARE SLT,LF,PF,7: Brand: MEDLINE

## (undated) DEVICE — SNAR E/S POLYP SNAREMASTER OVL/10MM 2.8X2300MM YEL

## (undated) DEVICE — SUT VIC 2/0 CT1 36IN

## (undated) DEVICE — STRYKER PERFORMANCE SERIES SAGITTAL BLADE: Brand: STRYKER PERFORMANCE SERIES

## (undated) DEVICE — ANTIBACTERIAL VIOLET BRAIDED (POLYGLACTIN 910), SYNTHETIC ABSORBABLE SURGICAL SUTURE: Brand: COATED VICRYL

## (undated) DEVICE — HANDPIECE SET WITH HIGH FLOW TIP AND SUCTION TUBE: Brand: INTERPULSE